# Patient Record
Sex: MALE | Race: BLACK OR AFRICAN AMERICAN | NOT HISPANIC OR LATINO | URBAN - NONMETROPOLITAN AREA
[De-identification: names, ages, dates, MRNs, and addresses within clinical notes are randomized per-mention and may not be internally consistent; named-entity substitution may affect disease eponyms.]

---

## 2017-06-26 LAB — CRC RECOMMENDATION EXT: NORMAL

## 2021-09-30 ENCOUNTER — OFFICE VISIT (OUTPATIENT)
Dept: FAMILY MEDICINE | Facility: CLINIC | Age: 66
End: 2021-09-30
Payer: COMMERCIAL

## 2021-09-30 VITALS
SYSTOLIC BLOOD PRESSURE: 106 MMHG | RESPIRATION RATE: 18 BRPM | BODY MASS INDEX: 29.4 KG/M2 | HEIGHT: 71 IN | DIASTOLIC BLOOD PRESSURE: 62 MMHG | TEMPERATURE: 99 F | HEART RATE: 86 BPM | OXYGEN SATURATION: 96 % | WEIGHT: 210 LBS

## 2021-09-30 DIAGNOSIS — I10 HYPERTENSION, UNSPECIFIED TYPE: Primary | ICD-10-CM

## 2021-09-30 DIAGNOSIS — Z12.5 SCREENING FOR PROSTATE CANCER: ICD-10-CM

## 2021-09-30 PROCEDURE — 99213 OFFICE O/P EST LOW 20 MIN: CPT | Mod: ,,, | Performed by: FAMILY MEDICINE

## 2021-09-30 PROCEDURE — 99213 PR OFFICE/OUTPT VISIT, EST, LEVL III, 20-29 MIN: ICD-10-PCS | Mod: ,,, | Performed by: FAMILY MEDICINE

## 2021-09-30 RX ORDER — FLUTICASONE PROPIONATE 50 MCG
2 SPRAY, SUSPENSION (ML) NASAL DAILY
COMMUNITY
End: 2021-11-16 | Stop reason: SDUPTHER

## 2021-09-30 RX ORDER — FLUTICASONE PROPIONATE AND SALMETEROL 250; 50 UG/1; UG/1
1 POWDER RESPIRATORY (INHALATION) DAILY
COMMUNITY
Start: 2021-04-04 | End: 2022-01-28

## 2021-09-30 RX ORDER — VALSARTAN AND HYDROCHLOROTHIAZIDE 160; 12.5 MG/1; MG/1
1 TABLET, FILM COATED ORAL DAILY
COMMUNITY
Start: 2021-06-29 | End: 2021-10-28 | Stop reason: SDUPTHER

## 2021-10-05 ENCOUNTER — TELEPHONE (OUTPATIENT)
Dept: FAMILY MEDICINE | Facility: CLINIC | Age: 66
End: 2021-10-05

## 2021-11-16 ENCOUNTER — OFFICE VISIT (OUTPATIENT)
Dept: FAMILY MEDICINE | Facility: CLINIC | Age: 66
End: 2021-11-16
Payer: COMMERCIAL

## 2021-11-16 VITALS
OXYGEN SATURATION: 98 % | HEART RATE: 79 BPM | SYSTOLIC BLOOD PRESSURE: 110 MMHG | TEMPERATURE: 98 F | HEIGHT: 71 IN | WEIGHT: 201 LBS | RESPIRATION RATE: 18 BRPM | DIASTOLIC BLOOD PRESSURE: 74 MMHG | BODY MASS INDEX: 28.14 KG/M2

## 2021-11-16 DIAGNOSIS — I10 HYPERTENSION, UNSPECIFIED TYPE: Primary | ICD-10-CM

## 2021-11-16 DIAGNOSIS — J30.9 ALLERGIC RHINITIS, UNSPECIFIED SEASONALITY, UNSPECIFIED TRIGGER: ICD-10-CM

## 2021-11-16 DIAGNOSIS — R06.02 SOB (SHORTNESS OF BREATH): ICD-10-CM

## 2021-11-16 PROCEDURE — 99213 PR OFFICE/OUTPT VISIT, EST, LEVL III, 20-29 MIN: ICD-10-PCS | Mod: ,,, | Performed by: FAMILY MEDICINE

## 2021-11-16 PROCEDURE — 99213 OFFICE O/P EST LOW 20 MIN: CPT | Mod: ,,, | Performed by: FAMILY MEDICINE

## 2021-11-16 RX ORDER — VALSARTAN 160 MG/1
160 TABLET ORAL DAILY
Qty: 90 TABLET | Refills: 0 | Status: SHIPPED | OUTPATIENT
Start: 2021-11-16 | End: 2021-11-24 | Stop reason: ALTCHOICE

## 2021-11-16 RX ORDER — FLUTICASONE PROPIONATE 50 MCG
2 SPRAY, SUSPENSION (ML) NASAL DAILY
Qty: 48 G | Refills: 1 | Status: SHIPPED | OUTPATIENT
Start: 2021-11-16 | End: 2023-01-30 | Stop reason: SDUPTHER

## 2021-11-23 ENCOUNTER — CLINICAL SUPPORT (OUTPATIENT)
Dept: PULMONOLOGY | Facility: HOSPITAL | Age: 66
End: 2021-11-23
Attending: FAMILY MEDICINE
Payer: COMMERCIAL

## 2021-11-23 DIAGNOSIS — R06.02 SOB (SHORTNESS OF BREATH): ICD-10-CM

## 2021-11-23 PROCEDURE — 94060 EVALUATION OF WHEEZING: CPT | Mod: 26,,, | Performed by: INTERNAL MEDICINE

## 2021-11-23 PROCEDURE — 94729 PR C02/MEMBANE DIFFUSE CAPACITY: ICD-10-PCS | Mod: 26,,, | Performed by: INTERNAL MEDICINE

## 2021-11-23 PROCEDURE — 94726 PLETHYSMOGRAPHY LUNG VOLUMES: CPT | Mod: 26,,, | Performed by: INTERNAL MEDICINE

## 2021-11-23 PROCEDURE — 94726 PULM FUNCT TST PLETHYSMOGRAP: ICD-10-PCS | Mod: 26,,, | Performed by: INTERNAL MEDICINE

## 2021-11-23 PROCEDURE — 94729 DIFFUSING CAPACITY: CPT | Mod: 26,,, | Performed by: INTERNAL MEDICINE

## 2021-11-23 PROCEDURE — 94729 DIFFUSING CAPACITY: CPT

## 2021-11-23 PROCEDURE — 94060 EVALUATION OF WHEEZING: CPT

## 2021-11-23 PROCEDURE — 94726 PLETHYSMOGRAPHY LUNG VOLUMES: CPT

## 2021-11-23 PROCEDURE — 94060 PR EVAL OF BRONCHOSPASM: ICD-10-PCS | Mod: 26,,, | Performed by: INTERNAL MEDICINE

## 2021-11-24 ENCOUNTER — TELEPHONE (OUTPATIENT)
Dept: FAMILY MEDICINE | Facility: CLINIC | Age: 66
End: 2021-11-24
Payer: COMMERCIAL

## 2021-11-24 ENCOUNTER — OFFICE VISIT (OUTPATIENT)
Dept: FAMILY MEDICINE | Facility: CLINIC | Age: 66
End: 2021-11-24
Payer: COMMERCIAL

## 2021-11-24 VITALS
HEART RATE: 91 BPM | WEIGHT: 201 LBS | OXYGEN SATURATION: 99 % | BODY MASS INDEX: 28.14 KG/M2 | RESPIRATION RATE: 18 BRPM | DIASTOLIC BLOOD PRESSURE: 98 MMHG | TEMPERATURE: 98 F | HEIGHT: 71 IN | SYSTOLIC BLOOD PRESSURE: 157 MMHG

## 2021-11-24 DIAGNOSIS — J98.4 RESTRICTIVE LUNG DISEASE: Primary | ICD-10-CM

## 2021-11-24 DIAGNOSIS — I10 HYPERTENSION, UNSPECIFIED TYPE: ICD-10-CM

## 2021-11-24 PROCEDURE — 99212 OFFICE O/P EST SF 10 MIN: CPT | Mod: ,,, | Performed by: FAMILY MEDICINE

## 2021-11-24 PROCEDURE — 99212 PR OFFICE/OUTPT VISIT, EST, LEVL II, 10-19 MIN: ICD-10-PCS | Mod: ,,, | Performed by: FAMILY MEDICINE

## 2021-11-24 RX ORDER — VALSARTAN AND HYDROCHLOROTHIAZIDE 160; 12.5 MG/1; MG/1
1 TABLET, FILM COATED ORAL DAILY
COMMUNITY
End: 2022-02-11 | Stop reason: SDUPTHER

## 2022-01-23 DIAGNOSIS — R06.02 SOB (SHORTNESS OF BREATH): Primary | ICD-10-CM

## 2022-01-27 ENCOUNTER — HOSPITAL ENCOUNTER (OUTPATIENT)
Dept: RADIOLOGY | Facility: HOSPITAL | Age: 67
Discharge: HOME | End: 2022-01-27
Attending: INTERNAL MEDICINE
Payer: COMMERCIAL

## 2022-01-27 ENCOUNTER — OFFICE VISIT (OUTPATIENT)
Dept: PULMONOLOGY | Facility: CLINIC | Age: 67
End: 2022-01-27
Payer: COMMERCIAL

## 2022-01-27 VITALS
DIASTOLIC BLOOD PRESSURE: 80 MMHG | SYSTOLIC BLOOD PRESSURE: 130 MMHG | OXYGEN SATURATION: 99 % | HEART RATE: 100 BPM | BODY MASS INDEX: 28.49 KG/M2 | HEIGHT: 73 IN | RESPIRATION RATE: 20 BRPM | WEIGHT: 215 LBS

## 2022-01-27 DIAGNOSIS — J98.4 RESTRICTIVE LUNG DISEASE: ICD-10-CM

## 2022-01-27 DIAGNOSIS — R06.02 SOB (SHORTNESS OF BREATH): ICD-10-CM

## 2022-01-27 PROCEDURE — 99203 OFFICE O/P NEW LOW 30 MIN: CPT | Mod: S$PBB,,, | Performed by: INTERNAL MEDICINE

## 2022-01-27 PROCEDURE — 71046 X-RAY EXAM CHEST 2 VIEWS: CPT | Mod: 26,,, | Performed by: RADIOLOGY

## 2022-01-27 PROCEDURE — 99203 PR OFFICE/OUTPT VISIT, NEW, LEVL III, 30-44 MIN: ICD-10-PCS | Mod: S$PBB,,, | Performed by: INTERNAL MEDICINE

## 2022-01-27 PROCEDURE — 71046 XR CHEST PA AND LATERAL: ICD-10-PCS | Mod: 26,,, | Performed by: RADIOLOGY

## 2022-01-27 PROCEDURE — 99215 OFFICE O/P EST HI 40 MIN: CPT | Mod: PBBFAC,25 | Performed by: INTERNAL MEDICINE

## 2022-01-27 PROCEDURE — 71046 X-RAY EXAM CHEST 2 VIEWS: CPT | Mod: TC

## 2022-01-27 RX ORDER — ALBUTEROL SULFATE 90 UG/1
2 AEROSOL, METERED RESPIRATORY (INHALATION) EVERY 6 HOURS PRN
Qty: 18 G | Refills: 5 | Status: SHIPPED | OUTPATIENT
Start: 2022-01-27 | End: 2022-06-13

## 2022-01-27 NOTE — ASSESSMENT & PLAN NOTE
Patient has been short of breath for approximately 2 years.  Currently without complaints other than that he denies any fever chills currently using anticholinergics inhaled steroids and Ventolin with not much improvement.  Chest x-ray looks unremarkable his restrictive disease in his poor functions without think a normal DLCO will proceed with high-resolution CT scan Cardiology evaluation and echocardiogram

## 2022-01-27 NOTE — PROGRESS NOTES
"Subjective:       Patient ID: Mikhail Stanford is a 66 y.o. male.    Chief Complaint: Referral    Shortness of Breath  This is a chronic problem. The current episode started more than 1 month ago. The problem occurs constantly. The problem has been unchanged. Pertinent negatives include no abdominal pain, chest pain, ear pain, headaches or rash. The symptoms are aggravated by exercise. The patient has no known risk factors for DVT/PE. He has tried steroid inhalers for the symptoms.     Past Medical History:   Diagnosis Date    COPD (chronic obstructive pulmonary disease)     Hypertension      No past surgical history on file.  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   HENT: Negative for congestion and ear pain.    Eyes: Negative for redness and itching.   Respiratory: Positive for shortness of breath.    Cardiovascular: Negative for chest pain and palpitations.   Musculoskeletal: Negative for arthralgias and back pain.   Skin: Negative for rash.   Gastrointestinal: Negative for abdominal pain and abdominal distention.   Neurological: Negative for dizziness and headaches.   Hematological: Negative for adenopathy. Does not bruise/bleed easily.   Psychiatric/Behavioral: Negative for confusion. The patient is not nervous/anxious.        Objective:      Physical Exam   Constitutional: He is " oriented to person, place, and time. He appears well-developed and well-nourished.   HENT:   Head: Normocephalic.   Nose: Nose normal.   Mouth/Throat: Oropharynx is clear and moist.   Neck: No JVD present. No thyromegaly present.   Cardiovascular: Normal rate, regular rhythm, normal heart sounds and intact distal pulses.   Pulmonary/Chest: Normal expansion, hyperinflation, symmetric chest wall expansion, effort normal and breath sounds normal.   Abdominal: Soft. Bowel sounds are normal.   Musculoskeletal:         General: Normal range of motion.      Cervical back: Normal range of motion and neck supple.   Lymphadenopathy: No supraclavicular adenopathy is present.     He has no cervical adenopathy.     He has no axillary adenopathy.   Neurological: He is alert and oriented to person, place, and time. He has normal reflexes.   Skin: Skin is warm and dry.   Psychiatric: He has a normal mood and affect. His behavior is normal.     Personal Diagnostic Review  none pertinent    No flowsheet data found.      Assessment:       1. Restrictive lung disease    2. SOB (shortness of breath)        Outpatient Encounter Medications as of 1/27/2022   Medication Sig Dispense Refill    atorvastatin (LIPITOR) 40 MG tablet TAKE 1 TABLET AT BEDTIME 90 tablet 1    esomeprazole (NEXIUM) 40 MG capsule TAKE 1 CAPSULE TWICE DAILY 180 capsule 1    ezetimibe (ZETIA) 10 mg tablet TAKE 1 TABLET DAILY 90 tablet 1    fluticasone propionate (FLONASE) 50 mcg/actuation nasal spray 2 sprays (100 mcg total) by Each Nostril route once daily. 48 g 1    SPIRIVA WITH HANDIHALER 18 mcg inhalation capsule INHALE THE CONTENTS OF 1   CAPSULE (VIA 2 INHALATIONS)DAILY 90 capsule 1    tadalafiL (CIALIS) 20 MG Tab TAKE 1 TABLET DAILY AS     NEEDED 24 tablet 3    valsartan-hydrochlorothiazide (DIOVAN-HCT) 160-12.5 mg per tablet Take 1 tablet by mouth once daily.      WIXELA INHUB 250-50 mcg/dose diskus inhaler Inhale 1 puff into the lungs once daily.       albuterol (VENTOLIN HFA) 90 mcg/actuation inhaler Inhale 2 puffs into the lungs every 6 (six) hours as needed for Wheezing. Rescue 18 g 5     No facility-administered encounter medications on file as of 1/27/2022.     Orders Placed This Encounter   Procedures    CT Chest High Resolution Without Contrast     Standing Status:   Future     Standing Expiration Date:   1/27/2023     Order Specific Question:   May the Radiologist modify the order per protocol to meet the clinical needs of the patient?     Answer:   Yes    Ambulatory referral/consult to Cardiology     Standing Status:   Future     Standing Expiration Date:   2/27/2023     Referral Priority:   Routine     Referral Type:   Consultation     Referral Reason:   Specialty Services Required     Requested Specialty:   Cardiology     Number of Visits Requested:   1    Echo     Standing Status:   Future     Standing Expiration Date:   1/27/2023     Order Specific Question:   Color Doppler?     Answer:   Yes     Order Specific Question:   Release to patient     Answer:   Immediate       Plan:       Problem List Items Addressed This Visit        Other    SOB (shortness of breath)     Patient has been short of breath for approximately 2 years.  Currently without complaints other than that he denies any fever chills currently using anticholinergics inhaled steroids and Ventolin with not much improvement.  Chest x-ray looks unremarkable his restrictive disease in his poor functions without think a normal DLCO will proceed with high-resolution CT scan Cardiology evaluation and echocardiogram         Relevant Medications    albuterol (VENTOLIN HFA) 90 mcg/actuation inhaler    Other Relevant Orders    Ambulatory referral/consult to Cardiology    CT Chest High Resolution Without Contrast    Echo      Other Visit Diagnoses     Restrictive lung disease

## 2022-02-04 ENCOUNTER — HOSPITAL ENCOUNTER (OUTPATIENT)
Dept: CARDIOLOGY | Facility: HOSPITAL | Age: 67
Discharge: HOME | End: 2022-02-04
Attending: INTERNAL MEDICINE
Payer: COMMERCIAL

## 2022-02-04 DIAGNOSIS — R06.02 SOB (SHORTNESS OF BREATH): ICD-10-CM

## 2022-02-04 LAB
AORTIC VALVE CUSP SEPERATION: 19.4 CM
AV INDEX (PROSTH): 0.96
AV VALVE AREA: 3.33 CM2
CV ECHO LV RWT: 0.84 CM
DOP CALC AO VTI: 21.38 CM
DOP CALC LVOT AREA: 3.5 CM2
DOP CALC LVOT DIAMETER: 2.1 CM
DOP CALC LVOT STROKE VOLUME: 71.21 CM3
DOP CALC MV VTI: 31.1 CM
DOP CALCLVOT PEAK VEL VTI: 20.57 CM
E WAVE DECELERATION TIME: 246 MSEC
ECHO LV POSTERIOR WALL: 1.73 CM (ref 0.6–1.1)
EJECTION FRACTION: 60 %
FRACTIONAL SHORTENING: 34 % (ref 28–44)
INTERVENTRICULAR SEPTUM: 1.2 CM (ref 0.6–1.1)
IVC DIAMETER: 2.1 CM
LEFT ATRIUM SIZE: 4.6 CM
LEFT INTERNAL DIMENSION IN SYSTOLE: 2.7 CM (ref 2.1–4)
LEFT VENTRICULAR INTERNAL DIMENSION IN DIASTOLE: 4.1 CM (ref 3.5–6)
LEFT VENTRICULAR MASS: 232.32 G
MV MEAN GRADIENT: 5 MMHG
MV PEAK E VEL: 1.28 M/S
MV STENOSIS PRESSURE HALF TIME: 63 MS
MV VALVE AREA BY CONTINUITY EQUATION: 2.29 CM2
MV VALVE AREA P 1/2 METHOD: 3.49 CM2

## 2022-02-04 PROCEDURE — 93306 TTE W/DOPPLER COMPLETE: CPT | Mod: 26,,, | Performed by: INTERNAL MEDICINE

## 2022-02-04 PROCEDURE — 93306 TTE W/DOPPLER COMPLETE: CPT

## 2022-02-04 PROCEDURE — 93306 ECHO (CUPID ONLY): ICD-10-PCS | Mod: 26,,, | Performed by: INTERNAL MEDICINE

## 2022-02-11 RX ORDER — VALSARTAN AND HYDROCHLOROTHIAZIDE 160; 12.5 MG/1; MG/1
1 TABLET, FILM COATED ORAL DAILY
Qty: 90 TABLET | Refills: 1 | Status: SHIPPED | OUTPATIENT
Start: 2022-02-11 | End: 2022-07-03 | Stop reason: SDUPTHER

## 2022-02-14 ENCOUNTER — OFFICE VISIT (OUTPATIENT)
Dept: CARDIOLOGY | Facility: CLINIC | Age: 67
End: 2022-02-14
Payer: COMMERCIAL

## 2022-02-14 VITALS
HEIGHT: 71 IN | SYSTOLIC BLOOD PRESSURE: 140 MMHG | HEART RATE: 95 BPM | RESPIRATION RATE: 16 BRPM | DIASTOLIC BLOOD PRESSURE: 80 MMHG | BODY MASS INDEX: 31.36 KG/M2 | WEIGHT: 224 LBS

## 2022-02-14 DIAGNOSIS — R06.02 SOB (SHORTNESS OF BREATH): Primary | ICD-10-CM

## 2022-02-14 DIAGNOSIS — I10 HYPERTENSION, UNSPECIFIED TYPE: ICD-10-CM

## 2022-02-14 DIAGNOSIS — R06.02 SOB (SHORTNESS OF BREATH): ICD-10-CM

## 2022-02-14 PROCEDURE — 93010 ELECTROCARDIOGRAM REPORT: CPT | Mod: S$PBB,,, | Performed by: STUDENT IN AN ORGANIZED HEALTH CARE EDUCATION/TRAINING PROGRAM

## 2022-02-14 PROCEDURE — 99204 PR OFFICE/OUTPT VISIT, NEW, LEVL IV, 45-59 MIN: ICD-10-PCS | Mod: S$PBB,,, | Performed by: STUDENT IN AN ORGANIZED HEALTH CARE EDUCATION/TRAINING PROGRAM

## 2022-02-14 PROCEDURE — 93010 EKG 12-LEAD: ICD-10-PCS | Mod: S$PBB,,, | Performed by: STUDENT IN AN ORGANIZED HEALTH CARE EDUCATION/TRAINING PROGRAM

## 2022-02-14 PROCEDURE — 93005 ELECTROCARDIOGRAM TRACING: CPT | Mod: PBBFAC | Performed by: STUDENT IN AN ORGANIZED HEALTH CARE EDUCATION/TRAINING PROGRAM

## 2022-02-14 PROCEDURE — 99204 OFFICE O/P NEW MOD 45 MIN: CPT | Mod: S$PBB,,, | Performed by: STUDENT IN AN ORGANIZED HEALTH CARE EDUCATION/TRAINING PROGRAM

## 2022-02-14 PROCEDURE — 99214 OFFICE O/P EST MOD 30 MIN: CPT | Mod: PBBFAC | Performed by: STUDENT IN AN ORGANIZED HEALTH CARE EDUCATION/TRAINING PROGRAM

## 2022-02-14 NOTE — ASSESSMENT & PLAN NOTE
Chronic  Follows Dr. Castellano; pulmonary workup bland. CT chest pending  ECHO with normal LVEF; indeterminate diastolic dysfunction  Will get EKG stress test.

## 2022-02-14 NOTE — PROGRESS NOTES
"PCP: Vishnu Ballesteros MD    Referring Provider:     Subjective:   Mikhail Stanford is a 67 y.o. male with hx of HTN, HLD who presents for evaluation of SOB.     Patient of Dr. Castellano.   Patient reports chronic issues with shortness of breath at rest and with exertion. Associated with runny nose. Episode occur daily. For last 2 years.     Fhx: non-contributary  Shx: Denies smoking, Hx of remote drug user (>30 years). No etoh    EKG 2/14/22 - NSR - normal  ECHO 2/2022 - normal LV and size fxn. Mild LAE, mitral valve thickening without stenosis. Indeterminate diastolic dysfunction        Lab Results   Component Value Date     10/04/2021    K 4.4 10/04/2021     10/04/2021    CO2 31 10/04/2021    BUN 28 (H) 10/04/2021    CREATININE 1.53 (H) 10/04/2021    CALCIUM 9.0 10/04/2021    ANIONGAP 9 10/04/2021    ESTGFRAFRICA 59 (L) 10/04/2021       Lab Results   Component Value Date    CHOL 126 10/04/2021     Lab Results   Component Value Date    HDL 67 (H) 10/04/2021     Lab Results   Component Value Date    LDLCALC 49 10/04/2021     Lab Results   Component Value Date    TRIG 49 10/04/2021     Lab Results   Component Value Date    CHOLHDL 1.9 10/04/2021       Lab Results   Component Value Date    WBC 5.15 10/04/2021    HGB 13.7 10/04/2021    HCT 41.8 10/04/2021    MCV 91.1 10/04/2021     10/04/2021           Review of Systems   Respiratory: Positive for shortness of breath. Negative for cough.    Cardiovascular: Negative for chest pain, palpitations, orthopnea, claudication, leg swelling and PND.         Objective:   BP (!) 140/80   Pulse 95   Resp 16   Ht 5' 11" (1.803 m)   Wt 101.6 kg (224 lb)   BMI 31.24 kg/m²     Physical Exam  Vitals and nursing note reviewed.   Cardiovascular:      Rate and Rhythm: Normal rate and regular rhythm.      Pulses: Normal pulses.      Heart sounds: Normal heart sounds.   Pulmonary:      Breath sounds: Normal breath sounds.   Neurological:      Mental Status: He is " oriented to person, place, and time.           Assessment:     1. SOB (shortness of breath)  Ambulatory referral/consult to Cardiology    EKG 12-lead    Exercise Stress - EKG   2. Hypertension, unspecified type           Plan:   SOB (shortness of breath)  Chronic  Follows Dr. Castellano; pulmonary workup bland. CT chest pending  ECHO with normal LVEF; indeterminate diastolic dysfunction  Will get EKG stress test.     Hypertension  140/80  Doing well  Advised caffeine reduction

## 2022-02-22 ENCOUNTER — HOSPITAL ENCOUNTER (OUTPATIENT)
Dept: CARDIOLOGY | Facility: HOSPITAL | Age: 67
Discharge: HOME | End: 2022-02-22
Attending: STUDENT IN AN ORGANIZED HEALTH CARE EDUCATION/TRAINING PROGRAM
Payer: COMMERCIAL

## 2022-02-22 VITALS
DIASTOLIC BLOOD PRESSURE: 74 MMHG | HEIGHT: 71 IN | HEART RATE: 89 BPM | BODY MASS INDEX: 30.1 KG/M2 | WEIGHT: 215 LBS | SYSTOLIC BLOOD PRESSURE: 123 MMHG

## 2022-02-22 DIAGNOSIS — R06.02 SOB (SHORTNESS OF BREATH): ICD-10-CM

## 2022-02-22 LAB
CV STRESS BASE HR: 89 BPM
DIASTOLIC BLOOD PRESSURE: 74 MMHG
OHS CV CPX 1 MINUTE RECOVERY HEART RATE: 21 BPM
OHS CV CPX 85 PERCENT MAX PREDICTED HEART RATE MALE: 132
OHS CV CPX ESTIMATED METS: 8
OHS CV CPX MAX PREDICTED HEART RATE: 155
OHS CV CPX PATIENT IS FEMALE: 0
OHS CV CPX PATIENT IS MALE: 1
OHS CV CPX PEAK DIASTOLIC BLOOD PRESSURE: 71 MMHG
OHS CV CPX PEAK HEAR RATE: 155 BPM
OHS CV CPX PEAK RATE PRESSURE PRODUCT: NORMAL
OHS CV CPX PEAK SYSTOLIC BLOOD PRESSURE: 160 MMHG
OHS CV CPX PERCENT MAX PREDICTED HEART RATE ACHIEVED: 100
OHS CV CPX RATE PRESSURE PRODUCT PRESENTING: NORMAL
STRESS ECHO POST EXERCISE DUR MIN: 6 MINUTES
STRESS ECHO POST EXERCISE DUR SEC: 43 SECONDS
SYSTOLIC BLOOD PRESSURE: 123 MMHG

## 2022-02-22 PROCEDURE — 93017 CV STRESS TEST TRACING ONLY: CPT

## 2022-02-22 PROCEDURE — 93018 CV STRESS TEST I&R ONLY: CPT | Mod: ,,, | Performed by: STUDENT IN AN ORGANIZED HEALTH CARE EDUCATION/TRAINING PROGRAM

## 2022-02-22 PROCEDURE — 93016 EXERCISE STRESS - EKG (CUPID ONLY): ICD-10-PCS | Mod: ,,, | Performed by: NURSE PRACTITIONER

## 2022-02-22 PROCEDURE — 93016 CV STRESS TEST SUPVJ ONLY: CPT | Mod: ,,, | Performed by: NURSE PRACTITIONER

## 2022-02-22 PROCEDURE — 93018 EXERCISE STRESS - EKG (CUPID ONLY): ICD-10-PCS | Mod: ,,, | Performed by: STUDENT IN AN ORGANIZED HEALTH CARE EDUCATION/TRAINING PROGRAM

## 2022-02-28 RX ORDER — TADALAFIL 20 MG/1
20 TABLET ORAL DAILY PRN
Qty: 24 TABLET | Refills: 3 | Status: SHIPPED | OUTPATIENT
Start: 2022-02-28 | End: 2022-06-13 | Stop reason: SDUPTHER

## 2022-02-28 RX ORDER — ATORVASTATIN CALCIUM 40 MG/1
40 TABLET, FILM COATED ORAL NIGHTLY
Qty: 90 TABLET | Refills: 1 | Status: SHIPPED | OUTPATIENT
Start: 2022-02-28 | End: 2022-07-25 | Stop reason: SDUPTHER

## 2022-03-08 ENCOUNTER — TELEPHONE (OUTPATIENT)
Dept: PULMONOLOGY | Facility: CLINIC | Age: 67
End: 2022-03-08
Payer: COMMERCIAL

## 2022-03-08 NOTE — TELEPHONE ENCOUNTER
Call was received earlier today from [facility]    who had been working on obtaining approval for   High Resolution CT Scan  that was scheduled for tomorrow as ordered by  at 's l;ast visit.    CT coverage was denied by pt's insurance.     was phoned and given this information.    He was encouraged to keep tomorrow's appt so he and    can review current status and future plans.  Pt voiced understanding and reported he plans to keep   appt as scheduled for 3/9/22.

## 2022-03-09 ENCOUNTER — OFFICE VISIT (OUTPATIENT)
Dept: PULMONOLOGY | Facility: CLINIC | Age: 67
End: 2022-03-09
Payer: COMMERCIAL

## 2022-03-09 VITALS
DIASTOLIC BLOOD PRESSURE: 90 MMHG | BODY MASS INDEX: 30.1 KG/M2 | HEART RATE: 93 BPM | WEIGHT: 215 LBS | OXYGEN SATURATION: 96 % | HEIGHT: 71 IN | SYSTOLIC BLOOD PRESSURE: 136 MMHG | RESPIRATION RATE: 16 BRPM

## 2022-03-09 DIAGNOSIS — J84.9 INTERSTITIAL LUNG DISEASE: ICD-10-CM

## 2022-03-09 PROCEDURE — 99212 PR OFFICE/OUTPT VISIT, EST, LEVL II, 10-19 MIN: ICD-10-PCS | Mod: S$PBB,,, | Performed by: INTERNAL MEDICINE

## 2022-03-09 PROCEDURE — 99212 OFFICE O/P EST SF 10 MIN: CPT | Mod: S$PBB,,, | Performed by: INTERNAL MEDICINE

## 2022-03-09 PROCEDURE — 99214 OFFICE O/P EST MOD 30 MIN: CPT | Mod: PBBFAC | Performed by: INTERNAL MEDICINE

## 2022-03-09 NOTE — PROGRESS NOTES
"Subjective:       Patient ID: Mikhail Stanford is a 67 y.o. male.    Chief Complaint: Interstitial Lung Disease (6 week follow up)    Shortness of Breath  This is a chronic problem. The current episode started more than 1 year ago. The problem occurs daily. The problem has been unchanged. Pertinent negatives include no abdominal pain, chest pain, ear pain, headaches or rash. Nothing aggravates the symptoms. The patient has no known risk factors for DVT/PE. He has tried nothing for the symptoms.     Past Medical History:   Diagnosis Date    COPD (chronic obstructive pulmonary disease)     Hypertension      History reviewed. No pertinent surgical history.  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   HENT: Negative for congestion and ear pain.    Eyes: Negative for redness and itching.   Respiratory: Positive for cough and shortness of breath.    Cardiovascular: Negative for chest pain and palpitations.   Musculoskeletal: Negative for arthralgias and back pain.   Skin: Negative for rash.   Gastrointestinal: Negative for abdominal pain and abdominal distention.   Neurological: Negative for dizziness and headaches.   Hematological: Negative for adenopathy. Does not bruise/bleed easily.   Psychiatric/Behavioral: Negative for confusion. The patient is not nervous/anxious.        Objective:    "   Physical Exam   Constitutional: He is oriented to person, place, and time. He appears well-developed and well-nourished.   HENT:   Head: Normocephalic.   Nose: Nose normal.   Mouth/Throat: Oropharynx is clear and moist.   Neck: No JVD present. No thyromegaly present.   Cardiovascular: Normal rate, regular rhythm, normal heart sounds and intact distal pulses.   Pulmonary/Chest: Normal expansion, hyperinflation, symmetric chest wall expansion and effort normal. He has rales.   Abdominal: Soft. Bowel sounds are normal.   Musculoskeletal:         General: Normal range of motion.      Cervical back: Normal range of motion and neck supple.   Lymphadenopathy: No supraclavicular adenopathy is present.     He has no cervical adenopathy.     He has no axillary adenopathy.   Neurological: He is alert and oriented to person, place, and time. He has normal reflexes.   Skin: Skin is warm and dry.   Psychiatric: He has a normal mood and affect. His behavior is normal.     Personal Diagnostic Review  none pertinent    No flowsheet data found.      Assessment:       1. Interstitial lung disease        Outpatient Encounter Medications as of 3/9/2022   Medication Sig Dispense Refill    albuterol (VENTOLIN HFA) 90 mcg/actuation inhaler Inhale 2 puffs into the lungs every 6 (six) hours as needed for Wheezing. Rescue 18 g 5    atorvastatin (LIPITOR) 40 MG tablet Take 1 tablet (40 mg total) by mouth nightly. 90 tablet 1    esomeprazole (NEXIUM) 40 MG capsule TAKE 1 CAPSULE TWICE DAILY 180 capsule 1    ezetimibe (ZETIA) 10 mg tablet TAKE 1 TABLET DAILY 90 tablet 1    fluticasone propionate (FLONASE) 50 mcg/actuation nasal spray 2 sprays (100 mcg total) by Each Nostril route once daily. 48 g 1    SPIRIVA WITH HANDIHALER 18 mcg inhalation capsule INHALE THE CONTENTS OF 1   CAPSULE (VIA 2 INHALATIONS)DAILY 90 capsule 1    tadalafiL (CIALIS) 20 MG Tab Take 1 tablet (20 mg total) by mouth daily as needed. 24 tablet 3     valsartan-hydrochlorothiazide (DIOVAN-HCT) 160-12.5 mg per tablet Take 1 tablet by mouth once daily. 90 tablet 1    WIXELA INHUB 250-50 mcg/dose diskus inhaler USE 1 INHALATION ORALLY    TWICE DAILY 180 each 1     No facility-administered encounter medications on file as of 3/9/2022.     Orders Placed This Encounter   Procedures    CT Chest High Resolution Without Contrast     Standing Status:   Future     Standing Expiration Date:   3/9/2023     Order Specific Question:   May the Radiologist modify the order per protocol to meet the clinical needs of the patient?     Answer:   Yes       Plan:       Problem List Items Addressed This Visit        Pulmonary    Interstitial lung disease     Patient has shortness of breath been going on for 2 years with pulmonary functions showed restrictive ventilatory impairment.  He is on multiple inhalers and doing well he does not smoke.  My differential includes interstitial lung disease including idiopathic pulmonary fibrosis.  The diagnostic test of choice is high-resolution CT scan of the chest he can sometimes have abnormal x-ray and it is the diagnostic standard.  Previously insurance refused to let him have a high-resolution CT scan his it was medically necessary my opinion is medically necessary were going to reschedule           Relevant Orders    CT Chest High Resolution Without Contrast

## 2022-03-09 NOTE — ASSESSMENT & PLAN NOTE
Patient has shortness of breath been going on for 2 years with pulmonary functions showed restrictive ventilatory impairment.  He is on multiple inhalers and doing well he does not smoke.  My differential includes interstitial lung disease including idiopathic pulmonary fibrosis.  The diagnostic test of choice is high-resolution CT scan of the chest he can sometimes have abnormal x-ray and it is the diagnostic standard.  Previously insurance refused to let him have a high-resolution CT scan his it was medically necessary my opinion is medically necessary were going to reschedule

## 2022-03-13 ENCOUNTER — OFFICE VISIT (OUTPATIENT)
Dept: FAMILY MEDICINE | Facility: CLINIC | Age: 67
End: 2022-03-13
Payer: COMMERCIAL

## 2022-03-13 VITALS
HEIGHT: 71 IN | HEART RATE: 94 BPM | DIASTOLIC BLOOD PRESSURE: 84 MMHG | WEIGHT: 219.19 LBS | TEMPERATURE: 98 F | BODY MASS INDEX: 30.69 KG/M2 | OXYGEN SATURATION: 98 % | SYSTOLIC BLOOD PRESSURE: 149 MMHG

## 2022-03-13 DIAGNOSIS — M26.622 ARTHRALGIA OF LEFT TEMPOROMANDIBULAR JOINT: Primary | ICD-10-CM

## 2022-03-13 PROCEDURE — 99213 OFFICE O/P EST LOW 20 MIN: CPT | Mod: 25,,, | Performed by: NURSE PRACTITIONER

## 2022-03-13 PROCEDURE — 96372 PR INJECTION,THERAP/PROPH/DIAG2ST, IM OR SUBCUT: ICD-10-PCS | Mod: ,,, | Performed by: NURSE PRACTITIONER

## 2022-03-13 PROCEDURE — 99051 PR MEDICAL SERVICES, EVE/WKEND/HOLIDAY: ICD-10-PCS | Mod: ,,, | Performed by: NURSE PRACTITIONER

## 2022-03-13 PROCEDURE — 99051 MED SERV EVE/WKEND/HOLIDAY: CPT | Mod: ,,, | Performed by: NURSE PRACTITIONER

## 2022-03-13 PROCEDURE — 96372 THER/PROPH/DIAG INJ SC/IM: CPT | Mod: ,,, | Performed by: NURSE PRACTITIONER

## 2022-03-13 PROCEDURE — 99213 PR OFFICE/OUTPT VISIT, EST, LEVL III, 20-29 MIN: ICD-10-PCS | Mod: 25,,, | Performed by: NURSE PRACTITIONER

## 2022-03-13 RX ORDER — TIOTROPIUM BROMIDE INHALATION SPRAY 3.12 UG/1
SPRAY, METERED RESPIRATORY (INHALATION)
COMMUNITY
End: 2022-06-13 | Stop reason: SDUPTHER

## 2022-03-13 RX ORDER — NAPROXEN 500 MG/1
500 TABLET ORAL 2 TIMES DAILY WITH MEALS
Qty: 20 TABLET | Refills: 0 | Status: SHIPPED | OUTPATIENT
Start: 2022-03-13 | End: 2022-06-13

## 2022-03-13 RX ORDER — KETOROLAC TROMETHAMINE 30 MG/ML
60 INJECTION, SOLUTION INTRAMUSCULAR; INTRAVENOUS
Status: COMPLETED | OUTPATIENT
Start: 2022-03-13 | End: 2022-03-13

## 2022-03-13 RX ORDER — CYCLOBENZAPRINE HCL 10 MG
5 TABLET ORAL 3 TIMES DAILY PRN
Qty: 15 TABLET | Refills: 0 | Status: SHIPPED | OUTPATIENT
Start: 2022-03-13 | End: 2022-03-23

## 2022-03-13 RX ORDER — TADALAFIL 10 MG/1
TABLET ORAL
COMMUNITY
End: 2022-06-13 | Stop reason: SDUPTHER

## 2022-03-13 RX ORDER — DEXAMETHASONE SODIUM PHOSPHATE 4 MG/ML
6 INJECTION, SOLUTION INTRA-ARTICULAR; INTRALESIONAL; INTRAMUSCULAR; INTRAVENOUS; SOFT TISSUE
Status: COMPLETED | OUTPATIENT
Start: 2022-03-13 | End: 2022-03-13

## 2022-03-13 RX ADMIN — KETOROLAC TROMETHAMINE 60 MG: 30 INJECTION, SOLUTION INTRAMUSCULAR; INTRAVENOUS at 09:03

## 2022-03-13 RX ADMIN — DEXAMETHASONE SODIUM PHOSPHATE 6 MG: 4 INJECTION, SOLUTION INTRA-ARTICULAR; INTRALESIONAL; INTRAMUSCULAR; INTRAVENOUS; SOFT TISSUE at 09:03

## 2022-03-13 NOTE — PROGRESS NOTES
""Nodule" palpated to right hip area prior to IM injection. Site avoided upon administering IM injection.    "

## 2022-03-13 NOTE — PROGRESS NOTES
Subjective:       Patient ID: Mikhail Stanford is a 67 y.o. male.    Chief Complaint: Otalgia (Left ear only when he chews or yawns)    66 y/o male presents today with c/o left ear pain x 1 week that only hurts when he chews and while closing his mouth after opening it wide. Pain was sudden onset, and 0/10 when not opening mouth or chewing but 10/10 if yawns or while chewing. Denies any previous jaw issues. No fever or chills. No difficulty swallowing. Of note patient also had dental surgery performed by Dr. Costello approximately 2 weeks ago and reports his mouth was open for hours. Surgery site is minimally tender and has significantly improved over the past two weeks. No signs of infection.    Review of Systems   Constitutional: Negative for chills and fever.   HENT: Positive for nasal congestion and ear pain. Negative for facial swelling, mouth sores, sinus pressure/congestion, sore throat and trouble swallowing.    Eyes: Negative for discharge and redness.   Respiratory: Negative for cough.    Gastrointestinal: Negative for abdominal pain, nausea and vomiting.   Musculoskeletal: Negative for neck pain and neck stiffness.   Hematological: Negative for adenopathy.         Objective:      Physical Exam  Vitals reviewed.   Constitutional:       General: He is not in acute distress.  HENT:      Head:      Jaw: Pain on movement present. No tenderness or swelling.      Salivary Glands: Right salivary gland is not diffusely enlarged or tender. Left salivary gland is not diffusely enlarged or tender.      Right Ear: Tympanic membrane normal. No tenderness. No middle ear effusion. No mastoid tenderness. Tympanic membrane is not perforated or erythematous.      Left Ear: Tympanic membrane normal. No tenderness.  No middle ear effusion. No mastoid tenderness. Tympanic membrane is not perforated or erythematous.      Nose: Nose normal.      Right Sinus: No maxillary sinus tenderness or frontal sinus tenderness.      Left Sinus:  No maxillary sinus tenderness or frontal sinus tenderness.      Mouth/Throat:      Lips: Pink.      Mouth: Mucous membranes are moist.      Dentition: No dental tenderness, gingival swelling or dental abscesses.      Tongue: No lesions.      Palate: No mass and lesions.      Pharynx: Oropharynx is clear. No pharyngeal swelling or posterior oropharyngeal erythema.   Cardiovascular:      Rate and Rhythm: Normal rate and regular rhythm.      Heart sounds: Normal heart sounds.   Pulmonary:      Effort: Pulmonary effort is normal.      Breath sounds: Normal breath sounds. No wheezing, rhonchi or rales.   Musculoskeletal:      Cervical back: Neck supple. No edema or rigidity.   Lymphadenopathy:      Cervical: No cervical adenopathy.   Skin:     General: Skin is warm and dry.   Neurological:      Mental Status: He is alert and oriented to person, place, and time.         Assessment:       Problem List Items Addressed This Visit    None     Visit Diagnoses     Arthralgia of left temporomandibular joint    -  Primary          Plan:       Toradol 60mg IM  Decadron 6mg IM     No signs of infection, instructed to follow up with Dr. Costello this week. Signs and symptoms that would warrant further medical evaluation were discussed in detail with patient, he verbalized understanding.  Flexeril and naproxen (do not take until 24 hours after toradol injection) as needed  Jaw rest, limit chewing, soft diet

## 2022-03-31 ENCOUNTER — HOSPITAL ENCOUNTER (OUTPATIENT)
Dept: RADIOLOGY | Facility: HOSPITAL | Age: 67
Discharge: HOME | End: 2022-03-31
Attending: INTERNAL MEDICINE
Payer: COMMERCIAL

## 2022-03-31 DIAGNOSIS — J84.9 INTERSTITIAL LUNG DISEASE: ICD-10-CM

## 2022-03-31 PROCEDURE — 71250 CT THORAX DX C-: CPT | Mod: TC

## 2022-03-31 PROCEDURE — 71250 CT THORAX DX C-: CPT | Mod: 26,,, | Performed by: RADIOLOGY

## 2022-03-31 PROCEDURE — 71250 CT CHEST HIGH RESOLUTION WITHOUT CONTRAST: ICD-10-PCS | Mod: 26,,, | Performed by: RADIOLOGY

## 2022-05-12 ENCOUNTER — HOSPITAL ENCOUNTER (OUTPATIENT)
Dept: RADIOLOGY | Facility: HOSPITAL | Age: 67
Discharge: HOME | End: 2022-05-12
Attending: INTERNAL MEDICINE
Payer: COMMERCIAL

## 2022-05-12 ENCOUNTER — OFFICE VISIT (OUTPATIENT)
Dept: PULMONOLOGY | Facility: CLINIC | Age: 67
End: 2022-05-12
Payer: COMMERCIAL

## 2022-05-12 VITALS
WEIGHT: 219 LBS | HEART RATE: 88 BPM | DIASTOLIC BLOOD PRESSURE: 76 MMHG | RESPIRATION RATE: 16 BRPM | SYSTOLIC BLOOD PRESSURE: 118 MMHG | HEIGHT: 71 IN | OXYGEN SATURATION: 95 % | BODY MASS INDEX: 30.66 KG/M2

## 2022-05-12 DIAGNOSIS — R22.2 LOCALIZED SWELLING, MASS AND LUMP, TRUNK: ICD-10-CM

## 2022-05-12 DIAGNOSIS — J84.9 INTERSTITIAL LUNG DISEASE: ICD-10-CM

## 2022-05-12 PROCEDURE — 99214 PR OFFICE/OUTPT VISIT, EST, LEVL IV, 30-39 MIN: ICD-10-PCS | Mod: S$PBB,,, | Performed by: INTERNAL MEDICINE

## 2022-05-12 PROCEDURE — 71046 X-RAY EXAM CHEST 2 VIEWS: CPT | Mod: TC

## 2022-05-12 PROCEDURE — 71046 X-RAY EXAM CHEST 2 VIEWS: CPT | Mod: 26,,, | Performed by: STUDENT IN AN ORGANIZED HEALTH CARE EDUCATION/TRAINING PROGRAM

## 2022-05-12 PROCEDURE — 99215 OFFICE O/P EST HI 40 MIN: CPT | Mod: PBBFAC,25 | Performed by: INTERNAL MEDICINE

## 2022-05-12 PROCEDURE — 99214 OFFICE O/P EST MOD 30 MIN: CPT | Mod: S$PBB,,, | Performed by: INTERNAL MEDICINE

## 2022-05-12 PROCEDURE — 71046 XR CHEST PA AND LATERAL: ICD-10-PCS | Mod: 26,,, | Performed by: STUDENT IN AN ORGANIZED HEALTH CARE EDUCATION/TRAINING PROGRAM

## 2022-05-12 NOTE — ASSESSMENT & PLAN NOTE
Patient has some mild restrictive changes on his pulmonary function test but no evidence of of decreased DLCO on his CT scan there is no evidence of any abnormality except there is a 1.3 cm lesion that looks infiltrative you were going to get x-ray today CBC it if not will repeat CT in 3 months to make sure there is no evidence of this mass if it is still there may have to consider biopsy

## 2022-05-12 NOTE — PROGRESS NOTES
"Subjective:       Patient ID: Mikhail Stanford is a 67 y.o. male.    Chief Complaint: Interstitial Lung Disease (2 month follow up)    Shortness of Breath  This is a chronic problem. The current episode started more than 1 year ago. The problem occurs intermittently. The problem has been unchanged. Pertinent negatives include no abdominal pain, chest pain, ear pain, headaches or rash. The symptoms are aggravated by exercise. The patient has no known risk factors for DVT/PE. His past medical history is significant for chronic lung disease.     Past Medical History:   Diagnosis Date    COPD (chronic obstructive pulmonary disease)     Hypertension      Past Surgical History:   Procedure Laterality Date    DENTAL SURGERY       Family History   Problem Relation Age of Onset    Cancer Mother     Diabetes Sister     Hyperlipidemia Sister      Review of patient's allergies indicates:  No Known Allergies   Social History     Tobacco Use    Smoking status: Former Smoker     Packs/day: 2.00     Years: 25.00     Pack years: 50.00     Types: Cigarettes    Smokeless tobacco: Former User     Types: Chew     Quit date: 1/27/2006   Substance Use Topics    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   HENT: Negative for congestion and ear pain.    Eyes: Negative for redness and itching.   Respiratory: Positive for shortness of breath.    Cardiovascular: Negative for chest pain and palpitations.   Musculoskeletal: Negative for arthralgias and back pain.   Skin: Negative for rash.   Gastrointestinal: Negative for abdominal pain and abdominal distention.   Neurological: Negative for dizziness and headaches.   Hematological: Negative for adenopathy. Does not bruise/bleed easily.   Psychiatric/Behavioral: Negative for " confusion. The patient is not nervous/anxious.        Objective:      Physical Exam   Constitutional: He is oriented to person, place, and time. He appears well-developed and well-nourished.   HENT:   Head: Normocephalic.   Nose: Nose normal.   Mouth/Throat: Oropharynx is clear and moist.   Neck: No JVD present. No thyromegaly present.   Cardiovascular: Normal rate, regular rhythm, normal heart sounds and intact distal pulses.   Pulmonary/Chest: Normal expansion, hyperinflation, symmetric chest wall expansion, effort normal and breath sounds normal.   Abdominal: Soft. Bowel sounds are normal.   Musculoskeletal:         General: Normal range of motion.      Cervical back: Normal range of motion and neck supple.   Lymphadenopathy: No supraclavicular adenopathy is present.     He has no cervical adenopathy.   Neurological: He is alert and oriented to person, place, and time. He has normal reflexes.   Skin: Skin is warm and dry.   Psychiatric: He has a normal mood and affect. His behavior is normal.     Personal Diagnostic Review  CT chest showed a 1.3 cm nodule last time chest x-ray is only in be obtained today to review repeat CT in 6 weeks    No flowsheet data found.      Assessment:       1. Localized swelling, mass and lump, trunk    2. Interstitial lung disease        Outpatient Encounter Medications as of 5/12/2022   Medication Sig Dispense Refill    albuterol (VENTOLIN HFA) 90 mcg/actuation inhaler Inhale 2 puffs into the lungs every 6 (six) hours as needed for Wheezing. Rescue (Patient not taking: Reported on 3/13/2022) 18 g 5    atorvastatin (LIPITOR) 40 MG tablet Take 1 tablet (40 mg total) by mouth nightly. 90 tablet 1    esomeprazole (NEXIUM) 40 MG capsule TAKE 1 CAPSULE TWICE DAILY 180 capsule 1    ezetimibe (ZETIA) 10 mg tablet TAKE 1 TABLET DAILY 90 tablet 1    fluticasone propionate (FLONASE) 50 mcg/actuation nasal spray 2 sprays (100 mcg total) by Each Nostril route once daily. 48 g 1    naproxen  (NAPROSYN) 500 MG tablet Take 1 tablet (500 mg total) by mouth 2 (two) times daily with meals. 20 tablet 0    SPIRIVA WITH HANDIHALER 18 mcg inhalation capsule INHALE THE CONTENTS OF 1   CAPSULE (VIA 2 INHALATIONS)DAILY 90 capsule 1    tadalafiL (CIALIS) 10 MG tablet 1 tablet as needed      tadalafiL (CIALIS) 20 MG Tab Take 1 tablet (20 mg total) by mouth daily as needed. 24 tablet 3    tiotropium bromide (SPIRIVA RESPIMAT) 2.5 mcg/actuation inhaler 2 puffs      valsartan-hydrochlorothiazide (DIOVAN-HCT) 160-12.5 mg per tablet Take 1 tablet by mouth once daily. 90 tablet 1    WIXELA INHUB 250-50 mcg/dose diskus inhaler USE 1 INHALATION ORALLY    TWICE DAILY 180 each 1     No facility-administered encounter medications on file as of 5/12/2022.     Orders Placed This Encounter   Procedures    CT Chest Without Contrast     Standing Status:   Future     Standing Expiration Date:   5/12/2023     Order Specific Question:   May the Radiologist modify the order per protocol to meet the clinical needs of the patient?     Answer:   Yes     Order Specific Question:   Access port per protocol?     Answer:   No    X-Ray Chest PA And Lateral     Standing Status:   Future     Number of Occurrences:   1     Standing Expiration Date:   5/12/2023     Order Specific Question:   May the Radiologist modify the order per protocol to meet the clinical needs of the patient?     Answer:   Yes       Plan:       Problem List Items Addressed This Visit        Pulmonary    Interstitial lung disease     Patient has some mild restrictive changes on his pulmonary function test but no evidence of of decreased DLCO on his CT scan there is no evidence of any abnormality except there is a 1.3 cm lesion that looks infiltrative you were going to get x-ray today CBC it if not will repeat CT in 3 months to make sure there is no evidence of this mass if it is still there may have to consider biopsy             Other Visit Diagnoses     Localized  swelling, mass and lump, trunk        Relevant Orders    CT Chest Without Contrast    X-Ray Chest PA And Lateral

## 2022-05-26 NOTE — PROGRESS NOTES
PCP: Vishnu Ballesteros MD    Referring Provider:     Subjective:   Mikhail Stanford is a 67 y.o. male with hx of HTN, HLD who presents for followup    5/27/22 - Patient states he has some shortness of breath.  He does not exercise often. Blood pressure controlled.  Has follow up with Dr. Castellano     2/14/22 - Patient of Dr. Castellano. Patient reports chronic issues with shortness of breath at rest and with exertion. Associated with runny nose. Episode occur daily. For last 2 years.     Fhx: non-contributary  Shx: Denies smoking, Hx of remote drug user (>30 years). No etoh    EKG 2/14/22 - NSR - normal  ECHO 2/2022 - normal LV and size fxn. Mild LAE, mitral valve thickening without stenosis. Indeterminate diastolic dysfunction  STRESS TEST 2/14/22 - negative for ischemia.  The patient exercised for 6 minutes 43 seconds, functional capacity of 8 METS.  The patient reported shortness of breath during the stress test. Denied chest pain/pressure/tightness.       Lab Results   Component Value Date     10/04/2021    K 4.4 10/04/2021     10/04/2021    CO2 31 10/04/2021    BUN 28 (H) 10/04/2021    CREATININE 1.53 (H) 10/04/2021    CALCIUM 9.0 10/04/2021    ANIONGAP 9 10/04/2021    ESTGFRAFRICA 59 (L) 10/04/2021       Lab Results   Component Value Date    CHOL 126 10/04/2021     Lab Results   Component Value Date    HDL 67 (H) 10/04/2021     Lab Results   Component Value Date    LDLCALC 49 10/04/2021     Lab Results   Component Value Date    TRIG 49 10/04/2021     Lab Results   Component Value Date    CHOLHDL 1.9 10/04/2021       Lab Results   Component Value Date    WBC 5.15 10/04/2021    HGB 13.7 10/04/2021    HCT 41.8 10/04/2021    MCV 91.1 10/04/2021     10/04/2021           Review of Systems   Respiratory: Negative for cough and shortness of breath.    Cardiovascular: Positive for leg swelling. Negative for chest pain, palpitations, orthopnea, claudication and PND.         Objective:   /70   Pulse  "74   Resp 18   Ht 5' 11" (1.803 m)   Wt 101.2 kg (223 lb)   BMI 31.10 kg/m²     Physical Exam  Vitals and nursing note reviewed.   Cardiovascular:      Rate and Rhythm: Normal rate and regular rhythm.      Pulses: Normal pulses.      Heart sounds: Normal heart sounds.   Pulmonary:      Breath sounds: Normal breath sounds.   Neurological:      Mental Status: He is oriented to person, place, and time.           Assessment:     1. SOB (shortness of breath)     2. Hypertension, unspecified type           Plan:   SOB (shortness of breath)  Chronic; likely due to decondition  Stress test - 7 mins; 8 METs  CT chest without  Coronary calcification.   Follows Dr. Castellano; pulmonary workup bland.   ECHO with normal LVEF; indeterminate diastolic dysfunction      Hypertension  Chronic controlled        "

## 2022-05-27 ENCOUNTER — OFFICE VISIT (OUTPATIENT)
Dept: CARDIOLOGY | Facility: CLINIC | Age: 67
End: 2022-05-27
Payer: COMMERCIAL

## 2022-05-27 VITALS
HEIGHT: 71 IN | WEIGHT: 223 LBS | HEART RATE: 74 BPM | DIASTOLIC BLOOD PRESSURE: 70 MMHG | BODY MASS INDEX: 31.22 KG/M2 | SYSTOLIC BLOOD PRESSURE: 138 MMHG | RESPIRATION RATE: 18 BRPM

## 2022-05-27 DIAGNOSIS — I10 HYPERTENSION, UNSPECIFIED TYPE: ICD-10-CM

## 2022-05-27 DIAGNOSIS — R06.02 SOB (SHORTNESS OF BREATH): ICD-10-CM

## 2022-05-27 PROCEDURE — 99214 PR OFFICE/OUTPT VISIT, EST, LEVL IV, 30-39 MIN: ICD-10-PCS | Mod: S$PBB,,, | Performed by: STUDENT IN AN ORGANIZED HEALTH CARE EDUCATION/TRAINING PROGRAM

## 2022-05-27 PROCEDURE — 99214 OFFICE O/P EST MOD 30 MIN: CPT | Mod: S$PBB,,, | Performed by: STUDENT IN AN ORGANIZED HEALTH CARE EDUCATION/TRAINING PROGRAM

## 2022-05-27 PROCEDURE — 99214 OFFICE O/P EST MOD 30 MIN: CPT | Mod: PBBFAC | Performed by: STUDENT IN AN ORGANIZED HEALTH CARE EDUCATION/TRAINING PROGRAM

## 2022-05-27 NOTE — ASSESSMENT & PLAN NOTE
Chronic; likely due to decondition  Stress test - 7 mins; 8 METs  CT chest without  Coronary calcification.   Follows Dr. Castellano; pulmonary workup bland.   ECHO with normal LVEF; indeterminate diastolic dysfunction

## 2022-06-13 ENCOUNTER — OFFICE VISIT (OUTPATIENT)
Dept: FAMILY MEDICINE | Facility: CLINIC | Age: 67
End: 2022-06-13
Payer: COMMERCIAL

## 2022-06-13 VITALS
DIASTOLIC BLOOD PRESSURE: 80 MMHG | TEMPERATURE: 99 F | WEIGHT: 221 LBS | HEIGHT: 71 IN | HEART RATE: 92 BPM | RESPIRATION RATE: 18 BRPM | OXYGEN SATURATION: 97 % | BODY MASS INDEX: 30.94 KG/M2 | SYSTOLIC BLOOD PRESSURE: 116 MMHG

## 2022-06-13 DIAGNOSIS — M79.645 PAIN OF LEFT THUMB: ICD-10-CM

## 2022-06-13 DIAGNOSIS — I10 HYPERTENSION, UNSPECIFIED TYPE: ICD-10-CM

## 2022-06-13 DIAGNOSIS — R05.9 COUGH: Primary | ICD-10-CM

## 2022-06-13 DIAGNOSIS — N52.9 ERECTILE DYSFUNCTION, UNSPECIFIED ERECTILE DYSFUNCTION TYPE: ICD-10-CM

## 2022-06-13 LAB
ALBUMIN SERPL BCP-MCNC: 3.3 G/DL (ref 3.5–5)
ALBUMIN/GLOB SERPL: 1 {RATIO}
ALP SERPL-CCNC: 66 U/L (ref 45–115)
ALT SERPL W P-5'-P-CCNC: 41 U/L (ref 16–61)
ANION GAP SERPL CALCULATED.3IONS-SCNC: 12 MMOL/L (ref 7–16)
AST SERPL W P-5'-P-CCNC: 26 U/L (ref 15–37)
BASOPHILS # BLD AUTO: 0.03 K/UL (ref 0–0.2)
BASOPHILS NFR BLD AUTO: 0.5 % (ref 0–1)
BILIRUB SERPL-MCNC: 0.6 MG/DL (ref 0–1.2)
BUN SERPL-MCNC: 26 MG/DL (ref 7–18)
BUN/CREAT SERPL: 15 (ref 6–20)
CALCIUM SERPL-MCNC: 9.7 MG/DL (ref 8.5–10.1)
CHLORIDE SERPL-SCNC: 105 MMOL/L (ref 98–107)
CO2 SERPL-SCNC: 26 MMOL/L (ref 21–32)
CREAT SERPL-MCNC: 1.71 MG/DL (ref 0.7–1.3)
DIFFERENTIAL METHOD BLD: ABNORMAL
EOSINOPHIL # BLD AUTO: 0.12 K/UL (ref 0–0.5)
EOSINOPHIL NFR BLD AUTO: 1.9 % (ref 1–4)
ERYTHROCYTE [DISTWIDTH] IN BLOOD BY AUTOMATED COUNT: 11.8 % (ref 11.5–14.5)
GLOBULIN SER-MCNC: 3.3 G/DL (ref 2–4)
GLUCOSE SERPL-MCNC: 107 MG/DL (ref 74–106)
HCT VFR BLD AUTO: 45.4 % (ref 40–54)
HGB BLD-MCNC: 15.1 G/DL (ref 13.5–18)
IMM GRANULOCYTES # BLD AUTO: 0.01 K/UL (ref 0–0.04)
IMM GRANULOCYTES NFR BLD: 0.2 % (ref 0–0.4)
LYMPHOCYTES # BLD AUTO: 2.06 K/UL (ref 1–4.8)
LYMPHOCYTES NFR BLD AUTO: 33 % (ref 27–41)
MCH RBC QN AUTO: 30 PG (ref 27–31)
MCHC RBC AUTO-ENTMCNC: 33.3 G/DL (ref 32–36)
MCV RBC AUTO: 90.1 FL (ref 80–96)
MONOCYTES # BLD AUTO: 0.68 K/UL (ref 0–0.8)
MONOCYTES NFR BLD AUTO: 10.9 % (ref 2–6)
MPC BLD CALC-MCNC: 9.8 FL (ref 9.4–12.4)
NEUTROPHILS # BLD AUTO: 3.34 K/UL (ref 1.8–7.7)
NEUTROPHILS NFR BLD AUTO: 53.5 % (ref 53–65)
NRBC # BLD AUTO: 0 X10E3/UL
NRBC, AUTO (.00): 0 %
PLATELET # BLD AUTO: 249 K/UL (ref 150–400)
POTASSIUM SERPL-SCNC: 4 MMOL/L (ref 3.5–5.1)
PROT SERPL-MCNC: 6.6 G/DL (ref 6.4–8.2)
RBC # BLD AUTO: 5.04 M/UL (ref 4.6–6.2)
SODIUM SERPL-SCNC: 139 MMOL/L (ref 136–145)
WBC # BLD AUTO: 6.24 K/UL (ref 4.5–11)

## 2022-06-13 PROCEDURE — 99214 OFFICE O/P EST MOD 30 MIN: CPT | Mod: ,,, | Performed by: FAMILY MEDICINE

## 2022-06-13 PROCEDURE — 85025 CBC WITH DIFFERENTIAL: ICD-10-PCS | Mod: ,,, | Performed by: CLINICAL MEDICAL LABORATORY

## 2022-06-13 PROCEDURE — 80053 COMPREHENSIVE METABOLIC PANEL: ICD-10-PCS | Mod: ,,, | Performed by: CLINICAL MEDICAL LABORATORY

## 2022-06-13 PROCEDURE — 85025 COMPLETE CBC W/AUTO DIFF WBC: CPT | Mod: ,,, | Performed by: CLINICAL MEDICAL LABORATORY

## 2022-06-13 PROCEDURE — 80053 COMPREHEN METABOLIC PANEL: CPT | Mod: ,,, | Performed by: CLINICAL MEDICAL LABORATORY

## 2022-06-13 PROCEDURE — 99214 PR OFFICE/OUTPT VISIT, EST, LEVL IV, 30-39 MIN: ICD-10-PCS | Mod: ,,, | Performed by: FAMILY MEDICINE

## 2022-06-13 RX ORDER — FLUTICASONE PROPIONATE AND SALMETEROL 250; 50 UG/1; UG/1
1 POWDER RESPIRATORY (INHALATION) DAILY
COMMUNITY
End: 2022-06-13 | Stop reason: SDUPTHER

## 2022-06-13 RX ORDER — FLUTICASONE PROPIONATE AND SALMETEROL 250; 50 UG/1; UG/1
1 POWDER RESPIRATORY (INHALATION) DAILY
Qty: 60 EACH | Refills: 1 | Status: SHIPPED | OUTPATIENT
Start: 2022-06-13 | End: 2022-08-09 | Stop reason: SDUPTHER

## 2022-06-13 RX ORDER — TADALAFIL 20 MG/1
20 TABLET ORAL DAILY PRN
Qty: 24 TABLET | Refills: 3 | Status: SHIPPED | OUTPATIENT
Start: 2022-06-13 | End: 2022-07-25 | Stop reason: SDUPTHER

## 2022-06-13 RX ORDER — AMLODIPINE BESYLATE 5 MG/1
5 TABLET ORAL DAILY
Qty: 30 TABLET | Refills: 2 | Status: SHIPPED | OUTPATIENT
Start: 2022-06-13 | End: 2023-01-30

## 2022-06-13 RX ORDER — HYDROCHLOROTHIAZIDE 12.5 MG/1
12.5 TABLET ORAL DAILY
Qty: 30 TABLET | Refills: 2 | Status: SHIPPED | OUTPATIENT
Start: 2022-06-13 | End: 2023-01-30

## 2022-06-13 NOTE — PROGRESS NOTES
Subjective:       Patient ID: Mikhail Stanford is a 67 y.o. male.    Chief Complaint: Cough (Reports productive with clear sputum, reports ongoing for several months ), Medication Refill, and Thumb Pain (Left, reports chronic, unsure if he has had xray)    Mikhail Stanford is a 67 year old male with a past medical history of hypertension who presents to the clinic today for follow up and complaints of a chronic cough. He reports his cough is chronic in nature but for the past two to three months has been associated with clear mucus and hoarseness. He states he has tried many over the counter medications with no relief of his symptoms. He denies fever, chills, rhinorrhea, sore throat, shortness of breath, chest pain, nausea, vomiting, diarrhea, myalgias, recent travel or sick contacts.     Review of Systems   Constitutional: Negative for chills and fever.   HENT: Negative for ear pain, postnasal drip, rhinorrhea, sinus pressure/congestion and sore throat.    Respiratory: Positive for cough. Negative for shortness of breath and wheezing.    Cardiovascular: Negative for chest pain and palpitations.   Gastrointestinal: Negative for diarrhea, nausea and vomiting.   Musculoskeletal: Negative for myalgias.   Allergic/Immunologic: Positive for environmental allergies.         Objective:      Physical Exam  Constitutional:       General: He is not in acute distress.     Appearance: Normal appearance.   HENT:      Head: Normocephalic and atraumatic.      Right Ear: Tympanic membrane normal. No middle ear effusion. There is no impacted cerumen.      Left Ear: Tympanic membrane normal.  No middle ear effusion. There is no impacted cerumen.      Nose:      Right Turbinates: Swollen.      Left Turbinates: Not swollen.      Right Sinus: No maxillary sinus tenderness or frontal sinus tenderness.      Left Sinus: No maxillary sinus tenderness or frontal sinus tenderness.      Mouth/Throat:      Mouth: Mucous membranes are moist.       Pharynx: Oropharynx is clear. Uvula midline. No oropharyngeal exudate.      Tonsils: No tonsillar exudate.   Eyes:      Extraocular Movements: Extraocular movements intact.      Conjunctiva/sclera: Conjunctivae normal.      Pupils: Pupils are equal, round, and reactive to light.   Neck:      Vascular: No carotid bruit.   Cardiovascular:      Rate and Rhythm: Normal rate and regular rhythm.      Heart sounds: Normal heart sounds. No murmur heard.    No friction rub. No gallop.   Pulmonary:      Effort: Pulmonary effort is normal.      Breath sounds: Normal breath sounds. No wheezing, rhonchi or rales.   Musculoskeletal:      Cervical back: Normal range of motion and neck supple. No tenderness.   Lymphadenopathy:      Cervical: No cervical adenopathy.   Skin:     General: Skin is warm and dry.      Coloration: Skin is not jaundiced.   Neurological:      Mental Status: He is alert.   Psychiatric:         Mood and Affect: Mood normal.         Behavior: Behavior normal.         Thought Content: Thought content normal.         Judgment: Judgment normal.         Assessment:       Problem List Items Addressed This Visit        Cardiac/Vascular    Hypertension    Relevant Medications    amLODIPine (NORVASC) 5 MG tablet    Other Relevant Orders    CBC Auto Differential    Comprehensive Metabolic Panel      Other Visit Diagnoses     Cough    -  Primary    Relevant Medications    fluticasone-salmeterol diskus inhaler 250-50 mcg    Pain of left thumb        Erectile dysfunction, unspecified erectile dysfunction type        Relevant Medications    tadalafiL (CIALIS) 20 MG Tab          Plan:       Discontinue Diovan-HCTZ due to chronic cough  Start Amlodipine 5mg tablet   Patient instructed to monitor blood pressure twice daily and bring log to next appointment  CBC and CMP drawn today, will call patient with results  Follow-up in 2 weeks

## 2022-06-14 ENCOUNTER — TELEPHONE (OUTPATIENT)
Dept: FAMILY MEDICINE | Facility: CLINIC | Age: 67
End: 2022-06-14
Payer: COMMERCIAL

## 2022-06-14 NOTE — TELEPHONE ENCOUNTER
----- Message from Vishnu Ballesteros MD sent at 6/14/2022  7:52 AM CDT -----  Patient's renal function is declining he appears to be dry.  Please make sure he follows up in 2 weeks.

## 2022-06-23 ENCOUNTER — HOSPITAL ENCOUNTER (OUTPATIENT)
Dept: RADIOLOGY | Facility: HOSPITAL | Age: 67
Discharge: HOME | End: 2022-06-23
Attending: INTERNAL MEDICINE
Payer: COMMERCIAL

## 2022-06-23 DIAGNOSIS — R22.2 LOCALIZED SWELLING, MASS AND LUMP, TRUNK: ICD-10-CM

## 2022-06-23 PROCEDURE — 71250 CT THORAX DX C-: CPT | Mod: TC

## 2022-06-23 PROCEDURE — 71250 CT CHEST WITHOUT CONTRAST: ICD-10-PCS | Mod: 26,,, | Performed by: STUDENT IN AN ORGANIZED HEALTH CARE EDUCATION/TRAINING PROGRAM

## 2022-06-23 PROCEDURE — 71250 CT THORAX DX C-: CPT | Mod: 26,,, | Performed by: STUDENT IN AN ORGANIZED HEALTH CARE EDUCATION/TRAINING PROGRAM

## 2022-06-27 ENCOUNTER — OFFICE VISIT (OUTPATIENT)
Dept: FAMILY MEDICINE | Facility: CLINIC | Age: 67
End: 2022-06-27
Payer: COMMERCIAL

## 2022-06-27 VITALS
WEIGHT: 221 LBS | DIASTOLIC BLOOD PRESSURE: 94 MMHG | SYSTOLIC BLOOD PRESSURE: 140 MMHG | OXYGEN SATURATION: 98 % | BODY MASS INDEX: 30.94 KG/M2 | RESPIRATION RATE: 18 BRPM | HEIGHT: 71 IN | TEMPERATURE: 99 F | HEART RATE: 87 BPM

## 2022-06-27 DIAGNOSIS — I10 HYPERTENSION, UNSPECIFIED TYPE: Primary | ICD-10-CM

## 2022-06-27 DIAGNOSIS — J44.9 CHRONIC OBSTRUCTIVE PULMONARY DISEASE, UNSPECIFIED COPD TYPE: ICD-10-CM

## 2022-06-27 PROCEDURE — 99212 PR OFFICE/OUTPT VISIT, EST, LEVL II, 10-19 MIN: ICD-10-PCS | Mod: ,,, | Performed by: FAMILY MEDICINE

## 2022-06-27 PROCEDURE — 99212 OFFICE O/P EST SF 10 MIN: CPT | Mod: ,,, | Performed by: FAMILY MEDICINE

## 2022-06-27 NOTE — PROGRESS NOTES
Agree with above.  For the meantime while he is taking Breztri, he will hold the Advair and the Spiriva.

## 2022-06-27 NOTE — PROGRESS NOTES
Subjective:       Patient ID: Mikhail Stanford is a 67 y.o. male.    Chief Complaint: Follow-up (2 week, lab review and bp )    Shoaib Stanford is a 67 year old male with a past medical history of hypertension who presents today for follow up on his hypertension. He states that since starting the Amlodipine his blood pressure readings at home have worsened with systolic readings in the 160's and diastolic readings in the low 100's and his chronic cough has not improved. Additionally he states that he has stopped using his Advair inhaler because he feels that it has not helped. Upon further review of his chart it was noted that his most recent chest CT did indicate mild emphysema. He denies fever, chills, shortness of breath, nausea, vomiting, diarrhea or lower extremity edema.     Follow-up  Associated symptoms include coughing (Chronic). Pertinent negatives include no chest pain, chills, fever, nausea or vomiting.     Review of Systems   Constitutional: Negative for chills and fever.   HENT: Negative for rhinorrhea and sinus pressure/congestion.    Respiratory: Positive for cough (Chronic). Negative for shortness of breath.    Cardiovascular: Negative for chest pain and leg swelling.   Gastrointestinal: Negative for diarrhea, nausea and vomiting.         Objective:      Physical Exam  Constitutional:       Appearance: Normal appearance.   HENT:      Nose: Nose normal. No rhinorrhea.      Mouth/Throat:      Mouth: Mucous membranes are moist.      Pharynx: Oropharynx is clear. No oropharyngeal exudate or posterior oropharyngeal erythema.   Eyes:      Extraocular Movements: Extraocular movements intact.      Pupils: Pupils are equal, round, and reactive to light.   Cardiovascular:      Rate and Rhythm: Normal rate and regular rhythm.      Pulses: Normal pulses.      Heart sounds: Normal heart sounds.   Pulmonary:      Effort: Pulmonary effort is normal.      Breath sounds: Normal breath sounds. No wheezing, rhonchi or  rales.   Musculoskeletal:      Cervical back: Normal range of motion and neck supple.   Lymphadenopathy:      Cervical: No cervical adenopathy.   Skin:     General: Skin is warm.      Coloration: Skin is not jaundiced.   Neurological:      Mental Status: He is alert.   Psychiatric:         Mood and Affect: Mood normal.         Behavior: Behavior normal.         Thought Content: Thought content normal.         Judgment: Judgment normal.         Assessment:       Problem List Items Addressed This Visit        Cardiac/Vascular    Hypertension - Primary      Other Visit Diagnoses     Chronic obstructive pulmonary disease, unspecified COPD type              Plan:       Stop Amlodipine 5 mg PO daily  Start Diovan-HCTZ    Patient given sample of Breztri 2 puffs twice daily   Follow-up in 2 weeks

## 2022-07-25 ENCOUNTER — OFFICE VISIT (OUTPATIENT)
Dept: FAMILY MEDICINE | Facility: CLINIC | Age: 67
End: 2022-07-25
Payer: COMMERCIAL

## 2022-07-25 VITALS
OXYGEN SATURATION: 99 % | DIASTOLIC BLOOD PRESSURE: 77 MMHG | WEIGHT: 221 LBS | BODY MASS INDEX: 30.94 KG/M2 | HEIGHT: 71 IN | HEART RATE: 96 BPM | SYSTOLIC BLOOD PRESSURE: 125 MMHG | RESPIRATION RATE: 19 BRPM

## 2022-07-25 DIAGNOSIS — N52.9 ERECTILE DYSFUNCTION, UNSPECIFIED ERECTILE DYSFUNCTION TYPE: ICD-10-CM

## 2022-07-25 DIAGNOSIS — E78.5 HYPERLIPIDEMIA, UNSPECIFIED HYPERLIPIDEMIA TYPE: ICD-10-CM

## 2022-07-25 DIAGNOSIS — J44.9 CHRONIC OBSTRUCTIVE PULMONARY DISEASE, UNSPECIFIED COPD TYPE: Primary | ICD-10-CM

## 2022-07-25 PROCEDURE — 99213 OFFICE O/P EST LOW 20 MIN: CPT | Mod: ,,, | Performed by: FAMILY MEDICINE

## 2022-07-25 PROCEDURE — 99213 PR OFFICE/OUTPT VISIT, EST, LEVL III, 20-29 MIN: ICD-10-PCS | Mod: ,,, | Performed by: FAMILY MEDICINE

## 2022-07-25 RX ORDER — EZETIMIBE 10 MG/1
10 TABLET ORAL DAILY
Qty: 90 TABLET | Refills: 1 | Status: SHIPPED | OUTPATIENT
Start: 2022-07-25 | End: 2023-01-10

## 2022-07-25 RX ORDER — ATORVASTATIN CALCIUM 40 MG/1
40 TABLET, FILM COATED ORAL NIGHTLY
Qty: 90 TABLET | Refills: 1 | Status: SHIPPED | OUTPATIENT
Start: 2022-07-25 | End: 2022-11-03

## 2022-07-25 RX ORDER — TADALAFIL 20 MG/1
20 TABLET ORAL DAILY PRN
Qty: 24 TABLET | Refills: 3 | Status: SHIPPED | OUTPATIENT
Start: 2022-07-25 | End: 2023-02-14 | Stop reason: SDUPTHER

## 2022-07-25 NOTE — PROGRESS NOTES
Mikhail Stanford is a 67 y.o. male seen today for follow-up on his hypertension.  Patient is tolerating his current medication well with good blood pressure readings.  Patient's edema has markedly improved.  He denies any chest pain but continues to have shortness of breath with persistent cough and chest congestion despite treatment with Breztri.  He reports his use of Advair and Spiriva worked better for him.  Still patient remained symptomatic and I recommended to follow-up with his pulmonologist.      Past Medical History:   Diagnosis Date    COPD (chronic obstructive pulmonary disease)     Hypertension      Family History   Problem Relation Age of Onset    Cancer Mother     Diabetes Sister     Hyperlipidemia Sister      Current Outpatient Medications on File Prior to Visit   Medication Sig Dispense Refill    amLODIPine (NORVASC) 5 MG tablet Take 1 tablet (5 mg total) by mouth once daily. 30 tablet 2    esomeprazole (NEXIUM) 40 MG capsule TAKE 1 CAPSULE TWICE DAILY 180 capsule 1    fluticasone propionate (FLONASE) 50 mcg/actuation nasal spray 2 sprays (100 mcg total) by Each Nostril route once daily. 48 g 1    fluticasone-salmeterol diskus inhaler 250-50 mcg Inhale 1 puff into the lungs once daily at 6am. 60 each 1    hydroCHLOROthiazide (HYDRODIURIL) 12.5 MG Tab Take 1 tablet (12.5 mg total) by mouth once daily. 30 tablet 2    SPIRIVA WITH HANDIHALER 18 mcg inhalation capsule INHALE THE CONTENTS OF 1   CAPSULE (VIA 2 INHALATIONS)DAILY 90 capsule 1    valsartan-hydrochlorothiazide (DIOVAN-HCT) 160-12.5 mg per tablet TAKE 1 TABLET DAILY 90 tablet 1    [DISCONTINUED] atorvastatin (LIPITOR) 40 MG tablet Take 1 tablet (40 mg total) by mouth nightly. 90 tablet 1    [DISCONTINUED] ezetimibe (ZETIA) 10 mg tablet TAKE 1 TABLET DAILY 90 tablet 1    [DISCONTINUED] tadalafiL (CIALIS) 20 MG Tab Take 1 tablet (20 mg total) by mouth daily as needed. 24 tablet 3     No current facility-administered medications on  file prior to visit.       There is no immunization history on file for this patient.    Review of Systems   Constitutional: Negative for fever, malaise/fatigue and weight loss.   Respiratory: Positive for cough, sputum production and shortness of breath.    Cardiovascular: Negative for chest pain and palpitations.   Gastrointestinal: Negative for nausea and vomiting.   Psychiatric/Behavioral: Negative for depression.        Vitals:    07/25/22 1414   BP: 125/77   Pulse: 96   Resp: 19       Physical Exam  Vitals reviewed.   Constitutional:       Appearance: Normal appearance.   HENT:      Head: Normocephalic.   Eyes:      Extraocular Movements: Extraocular movements intact.      Conjunctiva/sclera: Conjunctivae normal.      Pupils: Pupils are equal, round, and reactive to light.   Neck:      Thyroid: No thyroid mass or thyromegaly.   Cardiovascular:      Rate and Rhythm: Normal rate and regular rhythm.      Heart sounds: Normal heart sounds. No murmur heard.    No gallop.   Pulmonary:      Effort: Pulmonary effort is normal. No respiratory distress.      Breath sounds: Decreased air movement present. Examination of the right-upper field reveals rhonchi. Examination of the left-upper field reveals rhonchi. Decreased breath sounds and rhonchi present. No wheezing or rales.   Skin:     General: Skin is warm and dry.      Coloration: Skin is not jaundiced or pale.   Neurological:      Mental Status: He is alert.   Psychiatric:         Mood and Affect: Mood normal.         Behavior: Behavior normal.         Thought Content: Thought content normal.         Judgment: Judgment normal.          Assessment and Plan  Chronic obstructive pulmonary disease, unspecified COPD type  -     Ambulatory referral/consult to Pulmonology; Future; Expected date: 08/01/2022    Erectile dysfunction, unspecified erectile dysfunction type  -     tadalafiL (CIALIS) 20 MG Tab; Take 1 tablet (20 mg total) by mouth daily as needed (intercourse).   Dispense: 24 tablet; Refill: 3    Hyperlipidemia, unspecified hyperlipidemia type  -     atorvastatin (LIPITOR) 40 MG tablet; Take 1 tablet (40 mg total) by mouth nightly.  Dispense: 90 tablet; Refill: 1  -     ezetimibe (ZETIA) 10 mg tablet; Take 1 tablet (10 mg total) by mouth once daily.  Dispense: 90 tablet; Refill: 1            Return to clinic in 3 months for follow-up or as needed.    Health Maintenance Topics with due status: Not Due       Topic Last Completion Date    Lipid Panel 10/04/2021    Influenza Vaccine Not Due

## 2022-08-04 ENCOUNTER — OFFICE VISIT (OUTPATIENT)
Dept: PULMONOLOGY | Facility: CLINIC | Age: 67
End: 2022-08-04
Payer: COMMERCIAL

## 2022-08-04 VITALS
WEIGHT: 220 LBS | HEART RATE: 114 BPM | DIASTOLIC BLOOD PRESSURE: 76 MMHG | BODY MASS INDEX: 30.8 KG/M2 | SYSTOLIC BLOOD PRESSURE: 116 MMHG | OXYGEN SATURATION: 96 % | HEIGHT: 71 IN | RESPIRATION RATE: 20 BRPM

## 2022-08-04 DIAGNOSIS — J84.9 INTERSTITIAL LUNG DISEASE: ICD-10-CM

## 2022-08-04 DIAGNOSIS — R06.02 SOB (SHORTNESS OF BREATH): ICD-10-CM

## 2022-08-04 DIAGNOSIS — J44.9 CHRONIC OBSTRUCTIVE PULMONARY DISEASE, UNSPECIFIED COPD TYPE: ICD-10-CM

## 2022-08-04 DIAGNOSIS — R91.1 SOLITARY PULMONARY NODULE: ICD-10-CM

## 2022-08-04 DIAGNOSIS — R91.8 LUNG MASS: ICD-10-CM

## 2022-08-04 PROCEDURE — 99215 OFFICE O/P EST HI 40 MIN: CPT | Mod: PBBFAC | Performed by: INTERNAL MEDICINE

## 2022-08-04 PROCEDURE — 99214 PR OFFICE/OUTPT VISIT, EST, LEVL IV, 30-39 MIN: ICD-10-PCS | Mod: S$PBB,,, | Performed by: INTERNAL MEDICINE

## 2022-08-04 PROCEDURE — 99214 OFFICE O/P EST MOD 30 MIN: CPT | Mod: S$PBB,,, | Performed by: INTERNAL MEDICINE

## 2022-08-04 RX ORDER — ALBUTEROL SULFATE 90 UG/1
2 AEROSOL, METERED RESPIRATORY (INHALATION) EVERY 6 HOURS PRN
Qty: 18 G | Refills: 5 | Status: SHIPPED | OUTPATIENT
Start: 2022-08-04 | End: 2022-08-05

## 2022-08-04 NOTE — PROGRESS NOTES
"Subjective:       Patient ID: Mikhail Stanford is a 67 y.o. male.    Chief Complaint: Mass, Follow-up (Interstitial lung disease ), and Shortness of Breath    Mass  This is a chronic problem. The current episode started more than 1 month ago. The problem occurs constantly. The problem has been unchanged. Pertinent negatives include no abdominal pain, arthralgias, chest pain, chills, congestion, headaches or rash. Nothing aggravates the symptoms.   Follow-up  Pertinent negatives include no abdominal pain, arthralgias, chest pain, chills, congestion, headaches or rash.   Shortness of Breath  Pertinent negatives include no abdominal pain, chest pain, ear pain, headaches or rash.     Past Medical History:   Diagnosis Date    COPD (chronic obstructive pulmonary disease)     Hypertension      Past Surgical History:   Procedure Laterality Date    DENTAL SURGERY       Family History   Problem Relation Age of Onset    Cancer Mother     Diabetes Sister     Hyperlipidemia Sister      Review of patient's allergies indicates:  No Known Allergies   Social History     Tobacco Use    Smoking status: Former Smoker     Packs/day: 2.00     Years: 25.00     Pack years: 50.00     Types: Cigarettes    Smokeless tobacco: Former User     Types: Chew     Quit date: 1/27/2006   Substance Use Topics    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   HENT: Negative for congestion and ear pain.    Eyes: Negative for redness and itching.   Respiratory: Positive for shortness of breath.    Cardiovascular: Negative for chest pain and palpitations.   Musculoskeletal: Negative for arthralgias and back pain.   Skin: Negative for rash.   Gastrointestinal: Negative for abdominal pain and abdominal distention.   Neurological: Negative for " dizziness and headaches.   Hematological: Negative for adenopathy. Does not bruise/bleed easily.   Psychiatric/Behavioral: Negative for confusion. The patient is not nervous/anxious.        Objective:      Physical Exam   Constitutional: He is oriented to person, place, and time. He appears well-developed and well-nourished.   HENT:   Head: Normocephalic.   Nose: Nose normal.   Mouth/Throat: Oropharynx is clear and moist.   Neck: No JVD present. No thyromegaly present.   Cardiovascular: Normal rate, regular rhythm, normal heart sounds and intact distal pulses.   Pulmonary/Chest: Normal expansion, hyperinflation, symmetric chest wall expansion, effort normal and breath sounds normal.   Abdominal: Soft. Bowel sounds are normal.   Musculoskeletal:         General: Normal range of motion.      Cervical back: Normal range of motion and neck supple.   Lymphadenopathy: No supraclavicular adenopathy is present.     He has no cervical adenopathy.   Neurological: He is alert and oriented to person, place, and time. He has normal reflexes.   Skin: Skin is warm and dry.   Psychiatric: He has a normal mood and affect. His behavior is normal.     Personal Diagnostic Review  none pertinent    No flowsheet data found.      Assessment:       1. Chronic obstructive pulmonary disease, unspecified COPD type    2. Solitary pulmonary nodule    3. Lung mass    4. SOB (shortness of breath)    5. Interstitial lung disease        Outpatient Encounter Medications as of 8/4/2022   Medication Sig Dispense Refill    amLODIPine (NORVASC) 5 MG tablet Take 1 tablet (5 mg total) by mouth once daily. 30 tablet 2    atorvastatin (LIPITOR) 40 MG tablet Take 1 tablet (40 mg total) by mouth nightly. 90 tablet 1    esomeprazole (NEXIUM) 40 MG capsule TAKE 1 CAPSULE TWICE DAILY 180 capsule 1    ezetimibe (ZETIA) 10 mg tablet Take 1 tablet (10 mg total) by mouth once daily. 90 tablet 1    fluticasone propionate (FLONASE) 50 mcg/actuation nasal spray 2  sprays (100 mcg total) by Each Nostril route once daily. 48 g 1    fluticasone-salmeterol diskus inhaler 250-50 mcg Inhale 1 puff into the lungs once daily at 6am. 60 each 1    hydroCHLOROthiazide (HYDRODIURIL) 12.5 MG Tab Take 1 tablet (12.5 mg total) by mouth once daily. 30 tablet 2    SPIRIVA WITH HANDIHALER 18 mcg inhalation capsule INHALE THE CONTENTS OF 1   CAPSULE (VIA 2 INHALATIONS)DAILY 90 capsule 1    tadalafiL (CIALIS) 20 MG Tab Take 1 tablet (20 mg total) by mouth daily as needed (intercourse). 24 tablet 3    valsartan-hydrochlorothiazide (DIOVAN-HCT) 160-12.5 mg per tablet TAKE 1 TABLET DAILY 90 tablet 1    albuterol (VENTOLIN HFA) 90 mcg/actuation inhaler Inhale 2 puffs into the lungs every 6 (six) hours as needed for Wheezing. Rescue 18 g 5     No facility-administered encounter medications on file as of 8/4/2022.     Orders Placed This Encounter   Procedures    CT Chest Without Contrast     Standing Status:   Future     Standing Expiration Date:   8/4/2023     Order Specific Question:   May the Radiologist modify the order per protocol to meet the clinical needs of the patient?     Answer:   Yes     Order Specific Question:   Access port per protocol?     Answer:   No       Plan:       Problem List Items Addressed This Visit        Pulmonary    SOB (shortness of breath)     Patient has COPD which seems to be doing reasonably well at this time no changes in regimen other than some difficulty with secretions use Ventolin for that continue on subacute her on Spiriva does not smoke needs to lose weight increase activity           Interstitial lung disease     Infiltrative lesion from last CT in June will repeat it knocked over to make sure we do not have to do any further workup such as a bronchoscopy           Chronic obstructive pulmonary disease     Noted stable           Lung mass      Other Visit Diagnoses     Solitary pulmonary nodule        Relevant Orders    CT Chest Without Contrast

## 2022-08-04 NOTE — ASSESSMENT & PLAN NOTE
Patient has COPD which seems to be doing reasonably well at this time no changes in regimen other than some difficulty with secretions use Ventolin for that continue on subacute her on Spiriva does not smoke needs to lose weight increase activity

## 2022-08-04 NOTE — ASSESSMENT & PLAN NOTE
Infiltrative lesion from last CT in June will repeat it knocked over to make sure we do not have to do any further workup such as a bronchoscopy

## 2022-08-15 RX ORDER — ESOMEPRAZOLE MAGNESIUM 40 MG/1
40 CAPSULE, DELAYED RELEASE ORAL 2 TIMES DAILY
Qty: 180 CAPSULE | Refills: 1 | Status: SHIPPED | OUTPATIENT
Start: 2022-08-15 | End: 2023-01-30 | Stop reason: SDUPTHER

## 2022-09-09 ENCOUNTER — OFFICE VISIT (OUTPATIENT)
Dept: OTOLARYNGOLOGY | Facility: CLINIC | Age: 67
End: 2022-09-09
Payer: COMMERCIAL

## 2022-09-09 VITALS — WEIGHT: 220 LBS | BODY MASS INDEX: 30.8 KG/M2 | HEIGHT: 71 IN

## 2022-09-09 DIAGNOSIS — K21.9 GASTROESOPHAGEAL REFLUX DISEASE, UNSPECIFIED WHETHER ESOPHAGITIS PRESENT: Primary | ICD-10-CM

## 2022-09-09 DIAGNOSIS — R13.10 DYSPHAGIA, UNSPECIFIED TYPE: ICD-10-CM

## 2022-09-09 DIAGNOSIS — R49.0 HOARSENESS: ICD-10-CM

## 2022-09-09 PROCEDURE — 31575 DIAGNOSTIC LARYNGOSCOPY: CPT | Mod: S$PBB,,, | Performed by: OTOLARYNGOLOGY

## 2022-09-09 PROCEDURE — 99204 OFFICE O/P NEW MOD 45 MIN: CPT | Mod: S$PBB,25,, | Performed by: OTOLARYNGOLOGY

## 2022-09-09 PROCEDURE — 31575 PR LARYNGOSCOPY, FLEXIBLE; DIAGNOSTIC: ICD-10-PCS | Mod: S$PBB,,, | Performed by: OTOLARYNGOLOGY

## 2022-09-09 PROCEDURE — 31575 DIAGNOSTIC LARYNGOSCOPY: CPT | Mod: PBBFAC | Performed by: OTOLARYNGOLOGY

## 2022-09-09 PROCEDURE — 99204 PR OFFICE/OUTPT VISIT, NEW, LEVL IV, 45-59 MIN: ICD-10-PCS | Mod: S$PBB,25,, | Performed by: OTOLARYNGOLOGY

## 2022-09-09 PROCEDURE — 99213 OFFICE O/P EST LOW 20 MIN: CPT | Mod: PBBFAC,25 | Performed by: OTOLARYNGOLOGY

## 2022-09-09 RX ORDER — PANTOPRAZOLE SODIUM 20 MG/1
20 TABLET, DELAYED RELEASE ORAL DAILY
Qty: 30 TABLET | Refills: 11 | Status: SHIPPED | OUTPATIENT
Start: 2022-09-09 | End: 2023-06-28

## 2022-09-09 NOTE — PROGRESS NOTES
Subjective:       Patient ID: Mikhail Stanford is a 67 y.o. male.    Chief Complaint: Sore Throat (Pt states throat feels like there is something in there, coughs and clears throat frequently, states thick yellowish mucus at times. ) and Hoarse (Voice changes at times. )    Sore Throat   Associated symptoms include trouble swallowing.   Review of Systems   HENT:  Positive for sore throat, trouble swallowing and voice change.    All other systems reviewed and are negative.    Objective:      Physical Exam  General: NAD Hoarse   Head: Normocephalic, atraumatic, no facial asymmetry/normal strength,  Ears: Both auricules normal in appearance, w/o deformities tympanic membranes normal external auditory canals normal  Nose: External nose w/o deformities normal turbinates no drainage or inflammation  Oral Cavity: Lips, gums, floor of mouth, tongue hard palate, and buccal mucosa without mass/lesion  Oropharynx: Mucosa pink and moist, soft palate, posterior pharynx and oropharyngeal wall without mass/lesion  Neck: Supple, symmetric, trachea midline, no palpable mass/lesion, no palpable cervical lymphadenopathy  Skin: Warm and dry, no concerning lesions  Respiratory: Respirations even, unlabored     Nasal/Nasopharyngo/Laryn/Hypopharyngoscopy Procedures    Procedure:  Diagnostic nasal, nasopharyngoscopy, laryngoscopy and hypopharyngoscopy.    Routine preparation with local atomizer with 1% neosynephrine and lidocaine . With customary flexible endoscope.     NOSE:   External:  No gross deformity   Intranasal:    Mucosa:  No polyps, ulcers or lesions.    Septum:  No gross deformity.    Turbinates:  Not enlarged.    Nasopharynx:  No lesions.   Mucosa:  No lesions.   Posterior Choanae:  Patent.   Eustachian Tubes:  Patent.  Larynx/hypopharynx:   Epiglottis:  No lesions, without edema.   AE Folds:  No lesions.   Vocal cords:  No polyps; nl mobility   Subglottis: No obvious stenosis   Hypopharynx:  No lesions.   Piriform sinus:  No  pooling or lesions.   Post Cricoid: + edema & erythema       Assessment:       1. Gastroesophageal reflux disease, unspecified whether esophagitis present    2. Dysphagia, unspecified type    3. Hoarseness          Plan:       Protonix may need to see GI   F/u 2 months

## 2022-12-05 ENCOUNTER — OFFICE VISIT (OUTPATIENT)
Dept: PULMONOLOGY | Facility: CLINIC | Age: 67
End: 2022-12-05
Payer: COMMERCIAL

## 2022-12-05 ENCOUNTER — HOSPITAL ENCOUNTER (OUTPATIENT)
Dept: RADIOLOGY | Facility: HOSPITAL | Age: 67
Discharge: HOME | End: 2022-12-05
Attending: INTERNAL MEDICINE
Payer: COMMERCIAL

## 2022-12-05 VITALS
HEART RATE: 96 BPM | HEIGHT: 71 IN | DIASTOLIC BLOOD PRESSURE: 64 MMHG | BODY MASS INDEX: 30.8 KG/M2 | OXYGEN SATURATION: 97 % | SYSTOLIC BLOOD PRESSURE: 116 MMHG | WEIGHT: 220 LBS | RESPIRATION RATE: 20 BRPM

## 2022-12-05 DIAGNOSIS — R22.2 LOCALIZED SWELLING, MASS AND LUMP, TRUNK: Primary | ICD-10-CM

## 2022-12-05 DIAGNOSIS — R91.1 SOLITARY PULMONARY NODULE: ICD-10-CM

## 2022-12-05 DIAGNOSIS — R91.8 LUNG MASS: ICD-10-CM

## 2022-12-05 PROCEDURE — 99215 OFFICE O/P EST HI 40 MIN: CPT | Mod: PBBFAC,25 | Performed by: INTERNAL MEDICINE

## 2022-12-05 PROCEDURE — 71250 CT THORAX DX C-: CPT | Mod: TC

## 2022-12-05 PROCEDURE — 71250 CT CHEST WITHOUT CONTRAST: ICD-10-PCS | Mod: 26,,, | Performed by: STUDENT IN AN ORGANIZED HEALTH CARE EDUCATION/TRAINING PROGRAM

## 2022-12-05 PROCEDURE — 99213 OFFICE O/P EST LOW 20 MIN: CPT | Mod: S$PBB,,, | Performed by: INTERNAL MEDICINE

## 2022-12-05 PROCEDURE — 99213 PR OFFICE/OUTPT VISIT, EST, LEVL III, 20-29 MIN: ICD-10-PCS | Mod: S$PBB,,, | Performed by: INTERNAL MEDICINE

## 2022-12-05 PROCEDURE — 71250 CT THORAX DX C-: CPT | Mod: 26,,, | Performed by: STUDENT IN AN ORGANIZED HEALTH CARE EDUCATION/TRAINING PROGRAM

## 2022-12-05 NOTE — PROGRESS NOTES
"Subjective:       Patient ID: Mikhail Stanford is a 67 y.o. male.    Chief Complaint: COPD (4mo with ct)    COPD  This is a chronic problem. The current episode started more than 1 month ago. The problem occurs constantly. The problem has been unchanged. Pertinent negatives include no abdominal pain, arthralgias, chest pain, chills, congestion, headaches or rash.   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   HENT:  Negative for congestion and ear pain.    Eyes:  Negative for redness and itching.   Cardiovascular:  Negative for chest pain and palpitations.   Musculoskeletal:  Negative for arthralgias and back pain.   Skin:  Negative for rash.   Gastrointestinal:  Negative for abdominal pain and abdominal distention.   Neurological:  Negative for dizziness and headaches.   Hematological:  Negative for adenopathy. Does not bruise/bleed easily.   Psychiatric/Behavioral:  Negative for confusion. The patient is not nervous/anxious.      Objective:      Physical Exam   Constitutional: He is oriented to person, place, and time. He appears well-developed and well-nourished.   HENT:   Head: Normocephalic.   Nose: Nose normal.   Mouth/Throat: " Oropharynx is clear and moist.   Neck: No JVD present. No thyromegaly present.   Cardiovascular: Normal rate, regular rhythm, normal heart sounds and intact distal pulses.   Pulmonary/Chest: Normal expansion, hyperinflation, symmetric chest wall expansion, effort normal and breath sounds normal.   Abdominal: Soft. Bowel sounds are normal.   Musculoskeletal:         General: Normal range of motion.      Cervical back: Normal range of motion and neck supple.   Lymphadenopathy: No supraclavicular adenopathy is present.     He has no cervical adenopathy.   Neurological: He is alert and oriented to person, place, and time. He has normal reflexes.   Skin: Skin is warm and dry.   Psychiatric: He has a normal mood and affect. His behavior is normal.   Personal Diagnostic Review  none pertinent    No flowsheet data found.      Assessment:       1. Localized swelling, mass and lump, trunk    2. Lung mass          Outpatient Encounter Medications as of 12/5/2022   Medication Sig Dispense Refill    ADVAIR DISKUS 250-50 mcg/dose diskus inhaler USE 1 INHALATION ORALLY    ONCE DAILY AT 6AM 90 each 1    albuterol (PROVENTIL/VENTOLIN HFA) 90 mcg/actuation inhaler Inhale 1 puff into the lungs every 6 (six) hours as needed for Wheezing. 18 g 3    amLODIPine (NORVASC) 5 MG tablet Take 1 tablet (5 mg total) by mouth once daily. 30 tablet 2    atorvastatin (LIPITOR) 40 MG tablet TAKE 1 TABLET NIGHTLY 80 tablet 1    esomeprazole (NEXIUM) 40 MG capsule Take 1 capsule (40 mg total) by mouth 2 (two) times daily. 180 capsule 1    ezetimibe (ZETIA) 10 mg tablet Take 1 tablet (10 mg total) by mouth once daily. 90 tablet 1    fluticasone propionate (FLONASE) 50 mcg/actuation nasal spray 2 sprays (100 mcg total) by Each Nostril route once daily. 48 g 1    hydroCHLOROthiazide (HYDRODIURIL) 12.5 MG Tab Take 1 tablet (12.5 mg total) by mouth once daily. 30 tablet 2    pantoprazole (PROTONIX) 20 MG tablet Take 1 tablet (20 mg total) by mouth once  daily. 30 tablet 11    SPIRIVA WITH HANDIHALER 18 mcg inhalation capsule INHALE THE CONTENTS OF 1   CAPSULE (VIA 2 INHALATIONS)DAILY 90 capsule 1    tadalafiL (CIALIS) 20 MG Tab Take 1 tablet (20 mg total) by mouth daily as needed (intercourse). 24 tablet 3    valsartan-hydrochlorothiazide (DIOVAN-HCT) 160-12.5 mg per tablet TAKE 1 TABLET DAILY 90 tablet 1     No facility-administered encounter medications on file as of 12/5/2022.     Orders Placed This Encounter   Procedures    CT Chest Without Contrast     Standing Status:   Future     Standing Expiration Date:   12/5/2023     Order Specific Question:   May the Radiologist modify the order per protocol to meet the clinical needs of the patient?     Answer:   Yes     Order Specific Question:   Access port per protocol?     Answer:   No       Plan:       Problem List Items Addressed This Visit          Pulmonary    Lung mass     Patient has a right lower lobe lung mass which is smaller in size today compared to last CT repeat in 4 months will continue follow for 2 years          Other Visit Diagnoses       Localized swelling, mass and lump, trunk    -  Primary    Relevant Orders    CT Chest Without Contrast

## 2022-12-05 NOTE — ASSESSMENT & PLAN NOTE
Patient has a right lower lobe lung mass which is smaller in size today compared to last CT repeat in 4 months will continue follow for 2 years

## 2023-01-30 ENCOUNTER — OFFICE VISIT (OUTPATIENT)
Dept: FAMILY MEDICINE | Facility: CLINIC | Age: 68
End: 2023-01-30
Payer: COMMERCIAL

## 2023-01-30 VITALS
HEART RATE: 110 BPM | SYSTOLIC BLOOD PRESSURE: 128 MMHG | OXYGEN SATURATION: 98 % | HEIGHT: 71 IN | TEMPERATURE: 97 F | DIASTOLIC BLOOD PRESSURE: 72 MMHG | RESPIRATION RATE: 18 BRPM | WEIGHT: 220 LBS | BODY MASS INDEX: 30.8 KG/M2

## 2023-01-30 DIAGNOSIS — K21.9 GASTROESOPHAGEAL REFLUX DISEASE, UNSPECIFIED WHETHER ESOPHAGITIS PRESENT: ICD-10-CM

## 2023-01-30 DIAGNOSIS — Z23 NEED FOR PROPHYLACTIC VACCINATION AGAINST STREPTOCOCCUS PNEUMONIAE (PNEUMOCOCCUS): ICD-10-CM

## 2023-01-30 DIAGNOSIS — J30.9 ALLERGIC RHINITIS, UNSPECIFIED SEASONALITY, UNSPECIFIED TRIGGER: ICD-10-CM

## 2023-01-30 DIAGNOSIS — K13.0 LESION OF LIP: ICD-10-CM

## 2023-01-30 DIAGNOSIS — R19.6 HALITOSIS: ICD-10-CM

## 2023-01-30 DIAGNOSIS — R05.9 COUGH: ICD-10-CM

## 2023-01-30 DIAGNOSIS — Z12.5 SCREENING FOR PROSTATE CANCER: ICD-10-CM

## 2023-01-30 DIAGNOSIS — I10 HYPERTENSION, UNSPECIFIED TYPE: Primary | ICD-10-CM

## 2023-01-30 PROCEDURE — 90471 IMMUNIZATION ADMIN: CPT | Mod: ,,, | Performed by: FAMILY MEDICINE

## 2023-01-30 PROCEDURE — 90677 PCV20 VACCINE IM: CPT | Mod: ,,, | Performed by: FAMILY MEDICINE

## 2023-01-30 PROCEDURE — 90471 PNEUMOCOCCAL CONJUGATE VACCINE 20-VALENT: ICD-10-PCS | Mod: ,,, | Performed by: FAMILY MEDICINE

## 2023-01-30 PROCEDURE — 90677 PNEUMOCOCCAL CONJUGATE VACCINE 20-VALENT: ICD-10-PCS | Mod: ,,, | Performed by: FAMILY MEDICINE

## 2023-01-30 PROCEDURE — 99214 PR OFFICE/OUTPT VISIT, EST, LEVL IV, 30-39 MIN: ICD-10-PCS | Mod: 25,,, | Performed by: FAMILY MEDICINE

## 2023-01-30 PROCEDURE — 99214 OFFICE O/P EST MOD 30 MIN: CPT | Mod: 25,,, | Performed by: FAMILY MEDICINE

## 2023-01-30 RX ORDER — ESOMEPRAZOLE MAGNESIUM 40 MG/1
40 CAPSULE, DELAYED RELEASE ORAL 2 TIMES DAILY
Qty: 180 CAPSULE | Refills: 1 | Status: SHIPPED | OUTPATIENT
Start: 2023-01-30 | End: 2023-04-17 | Stop reason: SDUPTHER

## 2023-01-30 RX ORDER — FLUTICASONE PROPIONATE 50 MCG
2 SPRAY, SUSPENSION (ML) NASAL DAILY
Qty: 48 G | Refills: 1 | Status: SHIPPED | OUTPATIENT
Start: 2023-01-30 | End: 2023-12-19 | Stop reason: SDUPTHER

## 2023-01-30 RX ORDER — FLUTICASONE PROPIONATE AND SALMETEROL 250; 50 UG/1; UG/1
POWDER RESPIRATORY (INHALATION)
Qty: 90 EACH | Refills: 1 | Status: SHIPPED | OUTPATIENT
Start: 2023-01-30 | End: 2023-05-21 | Stop reason: SDUPTHER

## 2023-01-30 RX ORDER — VALSARTAN AND HYDROCHLOROTHIAZIDE 160; 12.5 MG/1; MG/1
1 TABLET, FILM COATED ORAL DAILY
Qty: 30 TABLET | Refills: 0 | Status: SHIPPED | OUTPATIENT
Start: 2023-01-30 | End: 2023-02-14 | Stop reason: SDUPTHER

## 2023-01-30 NOTE — PROGRESS NOTES
Mikhail Stanford is a 67 y.o. male seen today for follow-up on his hypertension and COPD.  Patient has not had lipids done a quite some time and will come to clinic tomorrow for follow-up lipid panel CBC CMP.  Patient denies any chest pain or shortness of breath above baseline.  He is complaining about a possible polypoid mass in his left lower lip.  Patient also would like to see her nose and throat regarding his chronic halitosis which is not responded to conservative therapy.      Past Medical History:   Diagnosis Date    COPD (chronic obstructive pulmonary disease)     Hypertension      Family History   Problem Relation Age of Onset    Cancer Mother     Diabetes Sister     Hyperlipidemia Sister      Current Outpatient Medications on File Prior to Visit   Medication Sig Dispense Refill    albuterol (PROVENTIL/VENTOLIN HFA) 90 mcg/actuation inhaler Inhale 1 puff into the lungs every 6 (six) hours as needed for Wheezing. 18 g 3    atorvastatin (LIPITOR) 40 MG tablet TAKE 1 TABLET NIGHTLY 80 tablet 1    ezetimibe (ZETIA) 10 mg tablet TAKE 1 TABLET ONCE DAILY 30 tablet 0    pantoprazole (PROTONIX) 20 MG tablet Take 1 tablet (20 mg total) by mouth once daily. 30 tablet 11    SPIRIVA WITH HANDIHALER 18 mcg inhalation capsule INHALE THE CONTENTS OF 1   CAPSULE (VIA 2 INHALATIONS)DAILY 90 capsule 1    tadalafiL (CIALIS) 20 MG Tab Take 1 tablet (20 mg total) by mouth daily as needed (intercourse). 24 tablet 3    [DISCONTINUED] ADVAIR DISKUS 250-50 mcg/dose diskus inhaler USE 1 INHALATION ORALLY    ONCE DAILY AT 6AM 90 each 1    [DISCONTINUED] esomeprazole (NEXIUM) 40 MG capsule Take 1 capsule (40 mg total) by mouth 2 (two) times daily. 180 capsule 1    [DISCONTINUED] fluticasone propionate (FLONASE) 50 mcg/actuation nasal spray 2 sprays (100 mcg total) by Each Nostril route once daily. 48 g 1    [DISCONTINUED] valsartan-hydrochlorothiazide (DIOVAN-HCT) 160-12.5 mg per tablet TAKE 1 TABLET DAILY 30 tablet 0    [DISCONTINUED]  amLODIPine (NORVASC) 5 MG tablet Take 1 tablet (5 mg total) by mouth once daily. (Patient not taking: Reported on 1/30/2023) 30 tablet 2    [DISCONTINUED] hydroCHLOROthiazide (HYDRODIURIL) 12.5 MG Tab Take 1 tablet (12.5 mg total) by mouth once daily. (Patient not taking: Reported on 1/30/2023) 30 tablet 2     No current facility-administered medications on file prior to visit.     Immunization History   Administered Date(s) Administered    Pneumococcal Conjugate - 20 Valent 01/30/2023       Review of Systems   Constitutional:  Negative for fever, malaise/fatigue and weight loss.   HENT:  Negative for hearing loss.    Eyes:  Negative for discharge.   Respiratory:  Negative for shortness of breath and wheezing.    Cardiovascular:  Negative for chest pain and palpitations.   Gastrointestinal:  Negative for blood in stool, constipation, diarrhea, nausea and vomiting.   Genitourinary:  Negative for hematuria and urgency.   Musculoskeletal:  Negative for neck pain.   Neurological:  Negative for weakness and headaches.   Endo/Heme/Allergies:  Negative for polydipsia.   Psychiatric/Behavioral:  Negative for depression.       Vitals:    01/30/23 1330   BP: 128/72   Pulse: 110   Resp: 18   Temp: 97.2 °F (36.2 °C)       Physical Exam  Vitals reviewed.   Constitutional:       Appearance: Normal appearance.   HENT:      Head: Normocephalic.      Mouth/Throat:      Lips: Pink. Lesions present.      Mouth: Mucous membranes are moist.      Comments: Patient has a 1 cm oval elevated area on the internal left lower lip.  Eyes:      Extraocular Movements: Extraocular movements intact.      Conjunctiva/sclera: Conjunctivae normal.      Pupils: Pupils are equal, round, and reactive to light.   Neck:      Thyroid: No thyroid mass or thyromegaly.   Cardiovascular:      Rate and Rhythm: Normal rate and regular rhythm.      Heart sounds: Normal heart sounds. No murmur heard.    No gallop.   Pulmonary:      Effort: Pulmonary effort is  normal. No respiratory distress.      Breath sounds: Normal breath sounds. No wheezing or rales.   Skin:     General: Skin is warm and dry.      Coloration: Skin is not jaundiced or pale.   Neurological:      Mental Status: He is alert.   Psychiatric:         Mood and Affect: Mood normal.         Behavior: Behavior normal.         Thought Content: Thought content normal.         Judgment: Judgment normal.        Assessment and Plan  Hypertension, unspecified type  -     valsartan-hydrochlorothiazide (DIOVAN-HCT) 160-12.5 mg per tablet; Take 1 tablet by mouth once daily.  Dispense: 30 tablet; Refill: 0  -     CBC Auto Differential; Future; Expected date: 01/31/2023  -     Comprehensive Metabolic Panel; Future; Expected date: 01/31/2023  -     Lipid Panel; Future; Expected date: 01/31/2023    Cough  -     fluticasone-salmeterol diskus inhaler 250-50 mcg; USE 1 INHALATION ORALLY    ONCE DAILY AT 6AM  Dispense: 90 each; Refill: 1    Need for prophylactic vaccination against Streptococcus pneumoniae (pneumococcus)  -     (In Office Administered) Pneumococcal Conjugate Vaccine (20 Valent) (IM)    Lesion of lip  -     Ambulatory referral/consult to ENT; Future; Expected date: 02/06/2023    Halitosis  -     Ambulatory referral/consult to ENT; Future; Expected date: 02/06/2023    Allergic rhinitis, unspecified seasonality, unspecified trigger  -     fluticasone propionate (FLONASE) 50 mcg/actuation nasal spray; 2 sprays (100 mcg total) by Each Nostril route once daily.  Dispense: 48 g; Refill: 1    Gastroesophageal reflux disease, unspecified whether esophagitis present  -     esomeprazole (NEXIUM) 40 MG capsule; Take 1 capsule (40 mg total) by mouth 2 (two) times daily.  Dispense: 180 capsule; Refill: 1            Return to clinic as needed once his lab work is in.    Health Maintenance Topics with due status: Not Due       Topic Last Completion Date    Lipid Panel 10/04/2021       Answers submitted by the patient for this  visit:  Review of Systems Questionnaire (Submitted on 1/29/2023)  activity change: No  unexpected weight change: No  rhinorrhea: No  trouble swallowing: No  visual disturbance: No  chest tightness: No  polyuria: No  difficulty urinating: No  joint swelling: No  arthralgias: No  confusion: No  dysphoric mood: No

## 2023-02-01 ENCOUNTER — TELEPHONE (OUTPATIENT)
Dept: FAMILY MEDICINE | Facility: CLINIC | Age: 68
End: 2023-02-01
Payer: COMMERCIAL

## 2023-02-01 NOTE — TELEPHONE ENCOUNTER
----- Message from Vishnu Ballesteros MD sent at 2/1/2023  8:08 AM CST -----  Lipids look good but office visit for declining renal function.  
Pt notified and verbalized understanding. Transferred to scheduling.   
No

## 2023-02-03 ENCOUNTER — OFFICE VISIT (OUTPATIENT)
Dept: OTOLARYNGOLOGY | Facility: CLINIC | Age: 68
End: 2023-02-03
Payer: COMMERCIAL

## 2023-02-03 VITALS — HEIGHT: 71 IN | BODY MASS INDEX: 30.8 KG/M2 | WEIGHT: 220 LBS

## 2023-02-03 DIAGNOSIS — K21.9 GASTROESOPHAGEAL REFLUX DISEASE, UNSPECIFIED WHETHER ESOPHAGITIS PRESENT: Primary | ICD-10-CM

## 2023-02-03 DIAGNOSIS — K13.0 LESION OF LIP: ICD-10-CM

## 2023-02-03 DIAGNOSIS — R19.6 HALITOSIS: ICD-10-CM

## 2023-02-03 PROCEDURE — 99215 OFFICE O/P EST HI 40 MIN: CPT | Mod: PBBFAC | Performed by: OTOLARYNGOLOGY

## 2023-02-03 PROCEDURE — 99204 OFFICE O/P NEW MOD 45 MIN: CPT | Mod: S$PBB,,, | Performed by: OTOLARYNGOLOGY

## 2023-02-03 PROCEDURE — 99204 PR OFFICE/OUTPT VISIT, NEW, LEVL IV, 45-59 MIN: ICD-10-PCS | Mod: S$PBB,,, | Performed by: OTOLARYNGOLOGY

## 2023-02-03 NOTE — PROGRESS NOTES
Subjective:       Patient ID: Mikhail Stanford is a 67 y.o. male.    Chief Complaint: Other (Patient presents for a left sided lip lesion with a throat odor. )  Has reflux not controlled   Dentist has worked on teeth  HPI  Review of Systems   HENT:  Positive for mouth sores.    All other systems reviewed and are negative.    Objective:      Physical Exam  General: NAD  Head: Normocephalic, atraumatic, no facial asymmetry/normal strength,  Ears: Both auricules normal in appearance, w/o deformities tympanic membranes normal external auditory canals normal  Nose: External nose w/o deformities normal turbinates no drainage or inflammation  Oral Cavity: Lips 2 cm lesion left lower , gums, floor of mouth, tongue hard palate, and buccal mucosa without mass/lesion  Oropharynx: Mucosa pink and moist, soft palate, posterior pharynx and oropharyngeal wall without mass/lesion  Neck: Supple, symmetric, trachea midline, no palpable mass/lesion, no palpable cervical lymphadenopathy  Skin: Warm and dry, no concerning lesions  Respiratory: Respirations even, unlabored   Assessment:       1. Gastroesophageal reflux disease, unspecified whether esophagitis present    2. Lesion of lip    3. Halitosis          Plan:       Refer to GI for GERD   Excise lip lesion in OR

## 2023-02-06 DIAGNOSIS — K21.9 GASTROESOPHAGEAL REFLUX DISEASE: Primary | ICD-10-CM

## 2023-02-07 ENCOUNTER — ANESTHESIA EVENT (OUTPATIENT)
Dept: GASTROENTEROLOGY | Facility: HOSPITAL | Age: 68
End: 2023-02-07
Payer: COMMERCIAL

## 2023-02-07 ENCOUNTER — HOSPITAL ENCOUNTER (OUTPATIENT)
Dept: GASTROENTEROLOGY | Facility: HOSPITAL | Age: 68
Discharge: HOME | End: 2023-02-07
Attending: INTERNAL MEDICINE
Payer: COMMERCIAL

## 2023-02-07 ENCOUNTER — ANESTHESIA (OUTPATIENT)
Dept: GASTROENTEROLOGY | Facility: HOSPITAL | Age: 68
End: 2023-02-07
Payer: COMMERCIAL

## 2023-02-07 VITALS
DIASTOLIC BLOOD PRESSURE: 87 MMHG | RESPIRATION RATE: 12 BRPM | SYSTOLIC BLOOD PRESSURE: 131 MMHG | TEMPERATURE: 98 F | HEART RATE: 78 BPM | OXYGEN SATURATION: 97 %

## 2023-02-07 DIAGNOSIS — K21.9 GASTROESOPHAGEAL REFLUX DISEASE: ICD-10-CM

## 2023-02-07 DIAGNOSIS — K21.9 GASTROESOPHAGEAL REFLUX DISEASE WITHOUT ESOPHAGITIS: Primary | ICD-10-CM

## 2023-02-07 DIAGNOSIS — K21.9 GASTROESOPHAGEAL REFLUX DISEASE, UNSPECIFIED WHETHER ESOPHAGITIS PRESENT: ICD-10-CM

## 2023-02-07 PROCEDURE — 43235 EGD DIAGNOSTIC BRUSH WASH: CPT | Performed by: INTERNAL MEDICINE

## 2023-02-07 PROCEDURE — D9220A PRA ANESTHESIA: Mod: ,,, | Performed by: NURSE ANESTHETIST, CERTIFIED REGISTERED

## 2023-02-07 PROCEDURE — 43235 EGD DIAGNOSTIC BRUSH WASH: CPT | Mod: ,,, | Performed by: INTERNAL MEDICINE

## 2023-02-07 PROCEDURE — 27000284 HC CANNULA NASAL: Performed by: NURSE ANESTHETIST, CERTIFIED REGISTERED

## 2023-02-07 PROCEDURE — 43235 PR EGD, FLEX, DIAGNOSTIC: ICD-10-PCS | Mod: ,,, | Performed by: INTERNAL MEDICINE

## 2023-02-07 PROCEDURE — 63600175 PHARM REV CODE 636 W HCPCS: Performed by: NURSE ANESTHETIST, CERTIFIED REGISTERED

## 2023-02-07 PROCEDURE — 25000003 PHARM REV CODE 250: Performed by: NURSE ANESTHETIST, CERTIFIED REGISTERED

## 2023-02-07 PROCEDURE — 37000008 HC ANESTHESIA 1ST 15 MINUTES

## 2023-02-07 PROCEDURE — 37000009 HC ANESTHESIA EA ADD 15 MINS

## 2023-02-07 PROCEDURE — D9220A PRA ANESTHESIA: ICD-10-PCS | Mod: ,,, | Performed by: NURSE ANESTHETIST, CERTIFIED REGISTERED

## 2023-02-07 RX ORDER — LIDOCAINE HYDROCHLORIDE 20 MG/ML
INJECTION, SOLUTION EPIDURAL; INFILTRATION; INTRACAUDAL; PERINEURAL
Status: DISCONTINUED | OUTPATIENT
Start: 2023-02-07 | End: 2023-02-07

## 2023-02-07 RX ORDER — PROPOFOL 10 MG/ML
VIAL (ML) INTRAVENOUS
Status: DISCONTINUED | OUTPATIENT
Start: 2023-02-07 | End: 2023-02-07

## 2023-02-07 RX ADMIN — PROPOFOL 120 MG: 10 INJECTION, EMULSION INTRAVENOUS at 09:02

## 2023-02-07 RX ADMIN — PROPOFOL 60 MG: 10 INJECTION, EMULSION INTRAVENOUS at 09:02

## 2023-02-07 RX ADMIN — LIDOCAINE HYDROCHLORIDE 50 MG: 20 INJECTION, SOLUTION INTRAVENOUS at 09:02

## 2023-02-07 RX ADMIN — SODIUM CHLORIDE: 9 INJECTION, SOLUTION INTRAVENOUS at 09:02

## 2023-02-07 NOTE — ANESTHESIA PREPROCEDURE EVALUATION
"                                                                                                             02/07/2023  Mikhail Stanford is a 67 y.o., male.    Past Medical History:   Diagnosis Date    COPD (chronic obstructive pulmonary disease)     Hypertension        Past Surgical History:   Procedure Laterality Date    DENTAL SURGERY         Family History   Problem Relation Age of Onset    Cancer Mother     Diabetes Sister     Hyperlipidemia Sister        Social History     Socioeconomic History    Marital status:    Tobacco Use    Smoking status: Former     Packs/day: 2.00     Years: 25.00     Pack years: 50.00     Types: Cigarettes    Smokeless tobacco: Former     Types: Chew     Quit date: 1/27/2006   Substance and Sexual Activity    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 18 g 3    atorvastatin (LIPITOR) 40 MG tablet TAKE 1 TABLET NIGHTLY 80 tablet 1    esomeprazole (NEXIUM) 40 MG capsule Take 1 capsule (40 mg total) by mouth 2 (two) times daily. 180 capsule 1    ezetimibe (ZETIA) 10 mg tablet TAKE 1 TABLET ONCE DAILY 30 tablet 0    fluticasone propionate (FLONASE) 50 mcg/actuation nasal spray 2 sprays (100 mcg total) by Each Nostril route once daily. 48 g 1    fluticasone-salmeterol diskus inhaler 250-50 mcg USE 1 INHALATION ORALLY    ONCE DAILY AT 6AM 90 each 1    pantoprazole (PROTONIX) 20 MG tablet Take 1 tablet (20 mg total) by mouth once daily. 30 tablet 11    SPIRIVA WITH HANDIHALER 18 mcg inhalation capsule INHALE THE CONTENTS OF 1   CAPSULE (VIA 2 INHALATIONS)DAILY 90 capsule 1    tadalafiL (CIALIS) 20 MG Tab Take 1 tablet (20 mg total) by mouth daily as " needed (intercourse). 24 tablet 3    valsartan-hydrochlorothiazide (DIOVAN-HCT) 160-12.5 mg per tablet Take 1 tablet by mouth once daily. 30 tablet 0     No current facility-administered medications for this encounter.       Review of patient's allergies indicates:  No Known Allergies    Pre-op Assessment    I have reviewed the Patient Summary Reports.     I have reviewed the Nursing Notes. I have reviewed the NPO Status.   I have reviewed the Medications.     Review of Systems  Anesthesia Hx:  No problems with previous Anesthesia    Social:  Former Smoker    Cardiovascular:   Hypertension    Pulmonary:   COPD Shortness of breath    Hepatic/GI:   GERD    Endocrine:  Obesity / BMI > 30      Physical Exam  General: Well nourished, Alert, Oriented and Cooperative    Airway:  Mallampati: II   Mouth Opening: Normal  Neck ROM: Normal ROM    Dental:  Intact    Chest/Lungs:  Normal Respiratory Rate    Heart:  Rate: Normal        Anesthesia Plan  Type of Anesthesia, risks & benefits discussed:    Anesthesia Type: Gen Natural Airway, MAC  Intra-op Monitoring Plan: Standard ASA Monitors  Post Op Pain Control Plan: multimodal analgesia and IV/PO Opioids PRN  Induction:  IV  Informed Consent: Informed consent signed with the Patient and all parties understand the risks and agree with anesthesia plan.  All questions answered. Patient consented to blood products? Yes  ASA Score: 3  Day of Surgery Review of History & Physical: I have interviewed and examined the patient. I have reviewed the patient's H&P dated: There are no significant changes.     Ready For Surgery From Anesthesia Perspective.     .

## 2023-02-07 NOTE — H&P
"Gastroenterology Pre-procedure H&P    Chief Complaint: GERD (gastroesophageal reflux disease)    History of Present Illness    Mikhail Stanford is a 67 y.o. male that  has a past medical history of COPD (chronic obstructive pulmonary disease), Hypertension, and Sleep apnea.     Patient with GERD on nexium with improvement without resolution of symptoms after being on nexium. Also with complaints of malodorous breath. No prior EGD. Reports he is up to date with his colonoscopy, none on file here.       Past Medical History:   Diagnosis Date    COPD (chronic obstructive pulmonary disease)     Hypertension     Sleep apnea        Past Surgical History:   Procedure Laterality Date    DENTAL SURGERY         Family History   Problem Relation Age of Onset    Cancer Mother     Diabetes Sister     Hyperlipidemia Sister        Social History     Socioeconomic History    Marital status:    Tobacco Use    Smoking status: Former     Packs/day: 2.00     Years: 25.00     Pack years: 50.00     Types: Cigarettes    Smokeless tobacco: Former     Types: Chew     Quit date: 1/27/2006   Substance and Sexual Activity    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 18 g 3    atorvastatin (LIPITOR) 40 MG tablet TAKE 1 TABLET NIGHTLY 80 tablet 1    esomeprazole (NEXIUM) 40 MG capsule Take 1 capsule (40 mg total) by mouth 2 (two) times daily. 180 capsule 1    ezetimibe (ZETIA) 10 mg tablet TAKE 1 TABLET ONCE DAILY 30 tablet 0    fluticasone propionate (FLONASE) 50 mcg/actuation nasal spray 2 sprays (100 mcg total) by Each Nostril route once daily. 48 g 1    fluticasone-salmeterol diskus inhaler 250-50 mcg USE 1 INHALATION " ORALLY    ONCE DAILY AT 6AM 90 each 1    pantoprazole (PROTONIX) 20 MG tablet Take 1 tablet (20 mg total) by mouth once daily. 30 tablet 11    SPIRIVA WITH HANDIHALER 18 mcg inhalation capsule INHALE THE CONTENTS OF 1   CAPSULE (VIA 2 INHALATIONS)DAILY 90 capsule 1    tadalafiL (CIALIS) 20 MG Tab Take 1 tablet (20 mg total) by mouth daily as needed (intercourse). 24 tablet 3    valsartan-hydrochlorothiazide (DIOVAN-HCT) 160-12.5 mg per tablet Take 1 tablet by mouth once daily. 30 tablet 0     No current facility-administered medications for this encounter.       Review of patient's allergies indicates:  No Known Allergies    Objective:  Vitals:    02/07/23 0909   BP: 127/80   Pulse: 71   Resp: 13   SpO2: 96%        GEN: normal appearing, NAD, AAO x3  HENT: NCAT, anicteric, OP benign  CV: normal rate, regular rhythm  RESP: CTA, symmetric rise, unlabored  ABD: soft, ND, no guarding or TTP  SKIN: warm and dry  NEURO: grossly afocal    Assessment and Plan:    Proceed with:    EGD for GERD      Fer Avila MD  Gastroenterology

## 2023-02-07 NOTE — TRANSFER OF CARE
Anesthesia Transfer of Care Note    Patient: Mikhail Stanford    Procedure(s) Performed: * EGD*    Patient location: GI    Anesthesia Type: general    Transport from OR: Transported from OR on room air with adequate spontaneous ventilation. Continuous ECG monitoring in transport. Continuous SpO2 monitoring in transport    Post pain: adequate analgesia    Post assessment: no apparent anesthetic complications    Post vital signs: stable    Level of consciousness: sedated and responds to stimulation    Nausea/Vomiting: no nausea/vomiting    Complications: none    Transfer of care protocol was followedComments: Good SV continue, NAD, VSS, RTRN      Last vitals:   Visit Vitals  /77   Pulse 78   Temp 36.7 °C (98 °F)   Resp 18   SpO2 95%

## 2023-02-07 NOTE — ANESTHESIA POSTPROCEDURE EVALUATION
Anesthesia Post Evaluation    Patient: Mikhail Stanford    Procedure(s) Performed: * EGD *    Final Anesthesia Type: general      Patient location during evaluation: GI PACU  Patient participation: Yes- Able to Participate  Level of consciousness: awake and alert  Post-procedure vital signs: reviewed and stable  Pain management: adequate  Airway patency: patent    PONV status at discharge: No PONV  Anesthetic complications: no      Cardiovascular status: blood pressure returned to baseline and hemodynamically stable  Respiratory status: spontaneous ventilation  Hydration status: euvolemic  Follow-up not needed.  Comments: Pt voices appreciation for care          Vitals Value Taken Time   /87 02/07/23 1012   Temp 98 02/07/23 1533   Pulse 78 02/07/23 1012   Resp 12 02/07/23 1012   SpO2 97 % 02/07/23 1012         Event Time   Out of Recovery 10:13:19         Pain/Bee Score: Bee Score: 10 (2/7/2023  9:44 AM)

## 2023-02-07 NOTE — DISCHARGE INSTRUCTIONS
Procedure Date  2/7/23     Impression  Overall Impression:   - The esophagus was normal  - The exam was difficult due to a J-shaped stomach, unable to intubate the duodenum with gastroscope  - Extent of exam was the antrum of the stomach  - No zenker's diverticulum, hiatal hernia, esophagitis, stricture, mass, gastritis, or gastric ulcer      Recommendation    Follow up with PCP  Continue PPI for acid reflux and follow a GERD diet  I did not switch to an enteroscope or peds colonoscope to attempt duodenual intubated due to symptoms being limited to reflux and halitosis, but if you were to develop new symptoms in the future, would need to use one of these for evaluation of the duodenum       Outcome of procedure: successful partial EGD (extent reached was antrum of stomach)  Disposition: patient to recovery following procedure; discharge to home when appropriate parameters met  Provisions for follow up: please call my office for any unexpected symptoms like chest or abdominal pain or bleeding following your procedure.  Final Diagnosis: GERD, halitosis     THE NURSE WILL CALL YOU WITH YOUR BIOPSY RESULTS IN A FEW DAYS.     NO DRIVING, OPERATING EQUIPMENT, OR SIGNING LEGAL DOCUMENTS FOR 24 HOURS.

## 2023-02-14 ENCOUNTER — OFFICE VISIT (OUTPATIENT)
Dept: FAMILY MEDICINE | Facility: CLINIC | Age: 68
End: 2023-02-14
Payer: COMMERCIAL

## 2023-02-14 VITALS
WEIGHT: 225 LBS | HEART RATE: 72 BPM | OXYGEN SATURATION: 97 % | RESPIRATION RATE: 18 BRPM | DIASTOLIC BLOOD PRESSURE: 80 MMHG | HEIGHT: 71 IN | SYSTOLIC BLOOD PRESSURE: 138 MMHG | BODY MASS INDEX: 31.5 KG/M2

## 2023-02-14 DIAGNOSIS — N52.9 ERECTILE DYSFUNCTION, UNSPECIFIED ERECTILE DYSFUNCTION TYPE: ICD-10-CM

## 2023-02-14 DIAGNOSIS — N28.9 RENAL INSUFFICIENCY: Primary | ICD-10-CM

## 2023-02-14 DIAGNOSIS — E78.5 HYPERLIPIDEMIA, UNSPECIFIED HYPERLIPIDEMIA TYPE: ICD-10-CM

## 2023-02-14 DIAGNOSIS — I10 HYPERTENSION, UNSPECIFIED TYPE: ICD-10-CM

## 2023-02-14 DIAGNOSIS — J44.9 CHRONIC OBSTRUCTIVE PULMONARY DISEASE, UNSPECIFIED COPD TYPE: ICD-10-CM

## 2023-02-14 PROCEDURE — 99213 PR OFFICE/OUTPT VISIT, EST, LEVL III, 20-29 MIN: ICD-10-PCS | Mod: ,,, | Performed by: FAMILY MEDICINE

## 2023-02-14 PROCEDURE — 99213 OFFICE O/P EST LOW 20 MIN: CPT | Mod: ,,, | Performed by: FAMILY MEDICINE

## 2023-02-14 RX ORDER — VALSARTAN AND HYDROCHLOROTHIAZIDE 160; 12.5 MG/1; MG/1
1 TABLET, FILM COATED ORAL DAILY
Qty: 90 TABLET | Refills: 1 | Status: SHIPPED | OUTPATIENT
Start: 2023-02-14 | End: 2023-04-17 | Stop reason: SDUPTHER

## 2023-02-14 RX ORDER — ATORVASTATIN CALCIUM 40 MG/1
40 TABLET, FILM COATED ORAL NIGHTLY
Qty: 90 TABLET | Refills: 1 | Status: SHIPPED | OUTPATIENT
Start: 2023-02-14 | End: 2023-04-17 | Stop reason: SDUPTHER

## 2023-02-14 RX ORDER — TADALAFIL 20 MG/1
20 TABLET ORAL DAILY PRN
Qty: 24 TABLET | Refills: 3 | Status: SHIPPED | OUTPATIENT
Start: 2023-02-14 | End: 2023-04-17 | Stop reason: SDUPTHER

## 2023-02-14 RX ORDER — TIOTROPIUM BROMIDE 18 UG/1
CAPSULE ORAL; RESPIRATORY (INHALATION)
Qty: 90 CAPSULE | Refills: 1 | Status: SHIPPED | OUTPATIENT
Start: 2023-02-14 | End: 2023-04-17 | Stop reason: DRUGHIGH

## 2023-02-14 NOTE — PROGRESS NOTES
Mikhail Stanford is a 68 y.o. male seen today for follow-up on his recent lab work.  His lipids are close to goal but his creatinine remains elevated with a GFR 48.  Patient reports he does take over-the-counter leave her joint pain and I have encouraged him to try topical Voltaren gel instead and then Tylenol possible.  In the past we had tried the hold or discontinue his diuretic and immediately his blood pressure did rise to worrisome levels.  We discussed a nephrology evaluation for further workup.      Past Medical History:   Diagnosis Date    COPD (chronic obstructive pulmonary disease)     Hypertension     Sleep apnea      Family History   Problem Relation Age of Onset    Cancer Mother     Diabetes Sister     Hyperlipidemia Sister      Current Outpatient Medications on File Prior to Visit   Medication Sig Dispense Refill    albuterol (PROVENTIL/VENTOLIN HFA) 90 mcg/actuation inhaler Inhale 1 puff into the lungs every 6 (six) hours as needed for Wheezing. 18 g 3    esomeprazole (NEXIUM) 40 MG capsule Take 1 capsule (40 mg total) by mouth 2 (two) times daily. 180 capsule 1    ezetimibe (ZETIA) 10 mg tablet TAKE 1 TABLET ONCE DAILY 90 tablet 1    fluticasone propionate (FLONASE) 50 mcg/actuation nasal spray 2 sprays (100 mcg total) by Each Nostril route once daily. 48 g 1    fluticasone-salmeterol diskus inhaler 250-50 mcg USE 1 INHALATION ORALLY    ONCE DAILY AT 6AM 90 each 1    pantoprazole (PROTONIX) 20 MG tablet Take 1 tablet (20 mg total) by mouth once daily. 30 tablet 11    [DISCONTINUED] atorvastatin (LIPITOR) 40 MG tablet TAKE 1 TABLET NIGHTLY 80 tablet 1    [DISCONTINUED] SPIRIVA WITH HANDIHALER 18 mcg inhalation capsule INHALE THE CONTENTS OF 1   CAPSULE (VIA 2 INHALATIONS)DAILY 90 capsule 1    [DISCONTINUED] tadalafiL (CIALIS) 20 MG Tab Take 1 tablet (20 mg total) by mouth daily as needed (intercourse). 24 tablet 3    [DISCONTINUED] valsartan-hydrochlorothiazide (DIOVAN-HCT) 160-12.5 mg per tablet Take  1 tablet by mouth once daily. 30 tablet 0     No current facility-administered medications on file prior to visit.     Immunization History   Administered Date(s) Administered    Pneumococcal Conjugate - 20 Valent 01/30/2023       Review of Systems   Constitutional:  Negative for fever, malaise/fatigue and weight loss.   Respiratory:  Negative for shortness of breath.    Cardiovascular:  Negative for chest pain and palpitations.   Gastrointestinal:  Negative for nausea and vomiting.   Psychiatric/Behavioral:  Negative for depression.       Vitals:    02/14/23 1014   BP: 138/80   Pulse: 72   Resp: 18       Physical Exam  Vitals reviewed.   Constitutional:       Appearance: Normal appearance.   HENT:      Head: Normocephalic.   Eyes:      Extraocular Movements: Extraocular movements intact.      Conjunctiva/sclera: Conjunctivae normal.      Pupils: Pupils are equal, round, and reactive to light.   Neck:      Thyroid: No thyroid mass or thyromegaly.   Cardiovascular:      Rate and Rhythm: Normal rate and regular rhythm.      Heart sounds: Normal heart sounds. No murmur heard.    No gallop.   Pulmonary:      Effort: Pulmonary effort is normal. No respiratory distress.      Breath sounds: Normal breath sounds. No wheezing or rales.   Skin:     General: Skin is warm and dry.      Coloration: Skin is not jaundiced or pale.   Neurological:      Mental Status: He is alert.   Psychiatric:         Mood and Affect: Mood normal.         Behavior: Behavior normal.         Thought Content: Thought content normal.         Judgment: Judgment normal.        Assessment and Plan  Renal insufficiency  -     Ambulatory referral/consult to Nephrology; Future; Expected date: 02/21/2023    Hypertension, unspecified type  -     valsartan-hydrochlorothiazide (DIOVAN-HCT) 160-12.5 mg per tablet; Take 1 tablet by mouth once daily.  Dispense: 90 tablet; Refill: 1    Erectile dysfunction, unspecified erectile dysfunction type  -     tadalafiL  (CIALIS) 20 MG Tab; Take 1 tablet (20 mg total) by mouth daily as needed (intercourse).  Dispense: 24 tablet; Refill: 3    Hyperlipidemia, unspecified hyperlipidemia type  -     atorvastatin (LIPITOR) 40 MG tablet; Take 1 tablet (40 mg total) by mouth every evening.  Dispense: 90 tablet; Refill: 1    Chronic obstructive pulmonary disease, unspecified COPD type  -     tiotropium (SPIRIVA WITH HANDIHALER) 18 mcg inhalation capsule; INHALE THE CONTENTS OF 1   CAPSULE (VIA 2 INHALATIONS)DAILY  Dispense: 90 capsule; Refill: 1            Return to clinic in 3 months or as needed.    Health Maintenance Topics with due status: Not Due       Topic Last Completion Date    Lipid Panel 01/31/2023

## 2023-02-17 ENCOUNTER — TELEPHONE (OUTPATIENT)
Dept: OTOLARYNGOLOGY | Facility: CLINIC | Age: 68
End: 2023-02-17
Payer: COMMERCIAL

## 2023-02-21 ENCOUNTER — ANESTHESIA (OUTPATIENT)
Dept: SURGERY | Facility: HOSPITAL | Age: 68
End: 2023-02-21
Payer: COMMERCIAL

## 2023-02-21 ENCOUNTER — ANESTHESIA EVENT (OUTPATIENT)
Dept: SURGERY | Facility: HOSPITAL | Age: 68
End: 2023-02-21
Payer: COMMERCIAL

## 2023-02-21 ENCOUNTER — HOSPITAL ENCOUNTER (OUTPATIENT)
Facility: HOSPITAL | Age: 68
Discharge: HOME | End: 2023-02-21
Attending: OTOLARYNGOLOGY | Admitting: OTOLARYNGOLOGY
Payer: COMMERCIAL

## 2023-02-21 VITALS
TEMPERATURE: 98 F | SYSTOLIC BLOOD PRESSURE: 118 MMHG | HEIGHT: 71 IN | OXYGEN SATURATION: 94 % | HEART RATE: 62 BPM | WEIGHT: 225 LBS | RESPIRATION RATE: 16 BRPM | BODY MASS INDEX: 31.5 KG/M2 | DIASTOLIC BLOOD PRESSURE: 82 MMHG

## 2023-02-21 DIAGNOSIS — K13.0 LIP LESION: Primary | ICD-10-CM

## 2023-02-21 DIAGNOSIS — K13.0 LESION OF LIP: ICD-10-CM

## 2023-02-21 PROCEDURE — 36000706: Performed by: OTOLARYNGOLOGY

## 2023-02-21 PROCEDURE — 36000707: Performed by: OTOLARYNGOLOGY

## 2023-02-21 PROCEDURE — 71000015 HC POSTOP RECOV 1ST HR: Performed by: OTOLARYNGOLOGY

## 2023-02-21 PROCEDURE — 25000003 PHARM REV CODE 250: Performed by: OTOLARYNGOLOGY

## 2023-02-21 PROCEDURE — 13151 CMPLX RPR E/N/E/L 1.1-2.5 CM: CPT | Mod: ,,, | Performed by: OTOLARYNGOLOGY

## 2023-02-21 PROCEDURE — D9220A PRA ANESTHESIA: Mod: ANES,,, | Performed by: ANESTHESIOLOGY

## 2023-02-21 PROCEDURE — 27000284 HC CANNULA NASAL: Performed by: NURSE ANESTHETIST, CERTIFIED REGISTERED

## 2023-02-21 PROCEDURE — 11443 EXC FACE-MM B9+MARG 2.1-3 CM: CPT | Mod: 51,,, | Performed by: OTOLARYNGOLOGY

## 2023-02-21 PROCEDURE — 13151 PR RECMPL WND LID,NOS,EAR 1.1-2.5 CM: ICD-10-PCS | Mod: ,,, | Performed by: OTOLARYNGOLOGY

## 2023-02-21 PROCEDURE — 27000716 HC OXISENSOR PROBE, ANY SIZE: Performed by: NURSE ANESTHETIST, CERTIFIED REGISTERED

## 2023-02-21 PROCEDURE — 37000008 HC ANESTHESIA 1ST 15 MINUTES: Performed by: OTOLARYNGOLOGY

## 2023-02-21 PROCEDURE — D9220A PRA ANESTHESIA: ICD-10-PCS | Mod: CRNA,,, | Performed by: NURSE ANESTHETIST, CERTIFIED REGISTERED

## 2023-02-21 PROCEDURE — 88305 SURGICAL PATHOLOGY: ICD-10-PCS | Mod: 26,,, | Performed by: PATHOLOGY

## 2023-02-21 PROCEDURE — D9220A PRA ANESTHESIA: ICD-10-PCS | Mod: ANES,,, | Performed by: ANESTHESIOLOGY

## 2023-02-21 PROCEDURE — 11443 PR EXC SKIN BENIG 2.1-3 CM FACE,FACIAL: ICD-10-PCS | Mod: 51,,, | Performed by: OTOLARYNGOLOGY

## 2023-02-21 PROCEDURE — 88305 TISSUE EXAM BY PATHOLOGIST: CPT | Mod: 26,,, | Performed by: PATHOLOGY

## 2023-02-21 PROCEDURE — D9220A PRA ANESTHESIA: Mod: CRNA,,, | Performed by: NURSE ANESTHETIST, CERTIFIED REGISTERED

## 2023-02-21 PROCEDURE — 37000009 HC ANESTHESIA EA ADD 15 MINS: Performed by: OTOLARYNGOLOGY

## 2023-02-21 PROCEDURE — 63600175 PHARM REV CODE 636 W HCPCS: Performed by: NURSE ANESTHETIST, CERTIFIED REGISTERED

## 2023-02-21 PROCEDURE — 71000033 HC RECOVERY, INTIAL HOUR: Performed by: OTOLARYNGOLOGY

## 2023-02-21 PROCEDURE — 25000003 PHARM REV CODE 250: Performed by: NURSE ANESTHETIST, CERTIFIED REGISTERED

## 2023-02-21 PROCEDURE — 88305 TISSUE EXAM BY PATHOLOGIST: CPT | Mod: TC,SUR | Performed by: OTOLARYNGOLOGY

## 2023-02-21 RX ORDER — LIDOCAINE HYDROCHLORIDE 20 MG/ML
INJECTION, SOLUTION EPIDURAL; INFILTRATION; INTRACAUDAL; PERINEURAL
Status: DISCONTINUED | OUTPATIENT
Start: 2023-02-21 | End: 2023-02-21

## 2023-02-21 RX ORDER — MEPERIDINE HYDROCHLORIDE 25 MG/ML
25 INJECTION INTRAMUSCULAR; INTRAVENOUS; SUBCUTANEOUS ONCE AS NEEDED
Status: DISCONTINUED | OUTPATIENT
Start: 2023-02-21 | End: 2023-02-21 | Stop reason: HOSPADM

## 2023-02-21 RX ORDER — ONDANSETRON 2 MG/ML
4 INJECTION INTRAMUSCULAR; INTRAVENOUS DAILY PRN
Status: DISCONTINUED | OUTPATIENT
Start: 2023-02-21 | End: 2023-02-21 | Stop reason: HOSPADM

## 2023-02-21 RX ORDER — HYDROMORPHONE HYDROCHLORIDE 2 MG/ML
0.5 INJECTION, SOLUTION INTRAMUSCULAR; INTRAVENOUS; SUBCUTANEOUS EVERY 5 MIN PRN
Status: DISCONTINUED | OUTPATIENT
Start: 2023-02-21 | End: 2023-02-21 | Stop reason: HOSPADM

## 2023-02-21 RX ORDER — LIDOCAINE HYDROCHLORIDE AND EPINEPHRINE 10; 10 MG/ML; UG/ML
INJECTION, SOLUTION INFILTRATION; PERINEURAL
Status: DISCONTINUED | OUTPATIENT
Start: 2023-02-21 | End: 2023-02-21 | Stop reason: HOSPADM

## 2023-02-21 RX ORDER — ONDANSETRON 2 MG/ML
INJECTION INTRAMUSCULAR; INTRAVENOUS
Status: DISCONTINUED | OUTPATIENT
Start: 2023-02-21 | End: 2023-02-21

## 2023-02-21 RX ORDER — PROPOFOL 10 MG/ML
VIAL (ML) INTRAVENOUS
Status: DISCONTINUED | OUTPATIENT
Start: 2023-02-21 | End: 2023-02-21

## 2023-02-21 RX ORDER — FENTANYL CITRATE 50 UG/ML
INJECTION, SOLUTION INTRAMUSCULAR; INTRAVENOUS
Status: DISCONTINUED | OUTPATIENT
Start: 2023-02-21 | End: 2023-02-21

## 2023-02-21 RX ORDER — DIPHENHYDRAMINE HYDROCHLORIDE 50 MG/ML
25 INJECTION INTRAMUSCULAR; INTRAVENOUS EVERY 6 HOURS PRN
Status: DISCONTINUED | OUTPATIENT
Start: 2023-02-21 | End: 2023-02-21 | Stop reason: HOSPADM

## 2023-02-21 RX ORDER — MIDAZOLAM HYDROCHLORIDE 1 MG/ML
INJECTION INTRAMUSCULAR; INTRAVENOUS
Status: DISCONTINUED | OUTPATIENT
Start: 2023-02-21 | End: 2023-02-21

## 2023-02-21 RX ORDER — MORPHINE SULFATE 10 MG/ML
4 INJECTION INTRAMUSCULAR; INTRAVENOUS; SUBCUTANEOUS EVERY 5 MIN PRN
Status: DISCONTINUED | OUTPATIENT
Start: 2023-02-21 | End: 2023-02-21 | Stop reason: HOSPADM

## 2023-02-21 RX ADMIN — PROPOFOL 40 MG: 10 INJECTION, EMULSION INTRAVENOUS at 10:02

## 2023-02-21 RX ADMIN — ONDANSETRON 4 MG: 2 INJECTION INTRAMUSCULAR; INTRAVENOUS at 10:02

## 2023-02-21 RX ADMIN — PROPOFOL 50 MG: 10 INJECTION, EMULSION INTRAVENOUS at 10:02

## 2023-02-21 RX ADMIN — LIDOCAINE HYDROCHLORIDE 100 MG: 20 INJECTION, SOLUTION EPIDURAL; INFILTRATION; INTRACAUDAL; PERINEURAL at 10:02

## 2023-02-21 RX ADMIN — FENTANYL CITRATE 100 MCG: 50 INJECTION INTRAMUSCULAR; INTRAVENOUS at 10:02

## 2023-02-21 RX ADMIN — MIDAZOLAM 2 MG: 1 INJECTION INTRAMUSCULAR; INTRAVENOUS at 10:02

## 2023-02-21 NOTE — BRIEF OP NOTE
[facility] - Orthopedic Periop Services  Brief Operative Note    Surgery Date: 2/21/2023     Surgeon(s) and Role:     * Kuldeep Yao MD - Primary    Assisting Surgeon: None    Pre-op Diagnosis:  Lesion of lip [K13.0]    Post-op Diagnosis:  Post-Op Diagnosis Codes:     * Lesion of lip [K13.0]    Procedure(s) (LRB):  EXCISION, LESION, LIP (N/A)    Anesthesia: General    Operative Findings: lip lesion 2.5 cm     Estimated Blood Loss: 0         Specimens:   Specimen (24h ago, onward)       Start     Ordered    02/21/23 1012  Surgical Pathology  RELEASE UPON ORDERING         02/21/23 1012                      Discharge Note    OUTCOME: Patient tolerated treatment/procedure well without complication and is now ready for discharge.    DISPOSITION: Home or Self Care    FINAL DIAGNOSIS:  lip lesion    FOLLOWUP: In clinic    DISCHARGE INSTRUCTIONS:  No discharge procedures on file.

## 2023-02-21 NOTE — ANESTHESIA POSTPROCEDURE EVALUATION
Anesthesia Post Evaluation    Patient: Mikhail Stanford    Procedure(s) Performed: Procedure(s) (LRB):  EXCISION, LESION, LIP (N/A)    Final Anesthesia Type: MAC      Patient location during evaluation: PACU  Patient participation: Yes- Able to Participate  Level of consciousness: awake and alert and oriented  Post-procedure vital signs: reviewed and stable  Pain management: adequate  Airway patency: patent  KATIE mitigation strategies: Multimodal analgesia  PONV status at discharge: No PONV  Anesthetic complications: no      Cardiovascular status: hemodynamically stable  Respiratory status: unassisted, spontaneous ventilation and nasal cannula  Hydration status: euvolemic  Follow-up needed           Vitals Value Taken Time   /69 02/21/23 1044   Temp 36.4 °C (97.5 °F) 02/21/23 1026   Pulse 81 02/21/23 1044   Resp 15 02/21/23 1040   SpO2 91 % 02/21/23 1044   Vitals shown include unvalidated device data.      No case tracking events are documented in the log.      Pain/Bee Score: Bee Score: 9 (2/21/2023 10:40 AM)

## 2023-02-21 NOTE — ANESTHESIA PREPROCEDURE EVALUATION
02/21/2023  Mikhail Stanford is a 68 y.o., male.      Pre-op Assessment    I have reviewed the Patient Summary Reports.     I have reviewed the Nursing Notes. I have reviewed the NPO Status.   I have reviewed the Medications.     Review of Systems  Anesthesia Hx:  No problems with previous Anesthesia  Denies Family Hx of Anesthesia complications.   Denies Personal Hx of Anesthesia complications.   Social:  Former Smoker, No Alcohol Use    Cardiovascular:   Exercise tolerance: good Hypertension hyperlipidemia ECG has been reviewed.    Pulmonary:   COPD, mild Shortness of breath Sleep Apnea H/O ILD, lung mass    PFTs show moderate restrictive lung disease   Renal/:   Chronic Renal Disease, CKD    Hepatic/GI:   GERD    Endocrine:  Obesity / BMI > 30      Physical Exam  General: Well nourished, Cooperative and Alert    Airway:  Mallampati: II   Mouth Opening: Normal  TM Distance: Normal  Tongue: Normal  Neck ROM: Normal ROM    Dental:  Intact    Chest/Lungs:  Clear to auscultation, Normal Respiratory Rate    Heart:  Rate: Normal  Rhythm: Regular Rhythm        Chemistry        Component Value Date/Time     01/31/2023 0815    K 4.0 01/31/2023 0815     01/31/2023 0815    CO2 27 01/31/2023 0815    BUN 30 (H) 01/31/2023 0815    CREATININE 1.59 (H) 01/31/2023 0815     01/31/2023 0815        Component Value Date/Time    CALCIUM 8.8 01/31/2023 0815    ALKPHOS 58 01/31/2023 0815    AST 23 01/31/2023 0815    ALT 40 01/31/2023 0815    BILITOT 0.7 01/31/2023 0815    ESTGFRAFRICA 59 (L) 10/04/2021 0913    EGFRNONAA 43 (L) 06/13/2022 1546        Lab Results   Component Value Date    WBC 5.19 01/31/2023    HGB 14.3 01/31/2023    HCT 45.5 01/31/2023     01/31/2023     Results for orders placed or performed in visit on 02/14/22   EKG 12-lead    Collection Time: 02/14/22  9:49 AM    Narrative    Test  Reason : R06.02,    Vent. Rate : 095 BPM     Atrial Rate : 095 BPM     P-R Int : 146 ms          QRS Dur : 068 ms      QT Int : 330 ms       P-R-T Axes : 053 023 064 degrees     QTc Int : 414 ms    Normal sinus rhythm  Normal ECG  No previous ECGs available  Confirmed by Troy CAMERON, Jeremias MALONE ([id number]) on 2/22/2022 8:43:02 AM    Referred By: CITLALI GARCIA           Confirmed By:Jeremias Simmons MD     TTE 2/4/22  Summary     The left ventricle is normal in size with mild concentric hypertrophy and   The estimated ejection fraction is 60%.   Normal right ventricular size.   Mild mitral regurgitation.   Mild tricuspid regurgitation.   Mild left atrial enlargement.      PFTs 11-23-21  1. Acceptable quality studies  2. No obstruction by ratio. No significant bronchodilator response. Low FEV1 and FVC, consistent with restriction.  3. Restriction by TLC, moderate.   4. Mild reduction in DLCO, corrected for Hgb.     Impression:  Moderate restriction with mild impairment in gas exchange.     Anesthesia Plan  Type of Anesthesia, risks & benefits discussed:    Anesthesia Type: Gen Supraglottic Airway  Intra-op Monitoring Plan: Standard ASA Monitors  Post Op Pain Control Plan: multimodal analgesia  Induction:  IV  Airway Plan: Direct, Post-Induction  Informed Consent: Informed consent signed with the Patient and all parties understand the risks and agree with anesthesia plan.  All questions answered.   ASA Score: 3  Day of Surgery Review of History & Physical: H&P Update referred to the surgeon/provider.I have interviewed and examined the patient. I have reviewed the patient's H&P dated: There are no significant changes.     Ready For Surgery From Anesthesia Perspective.     .

## 2023-02-21 NOTE — TRANSFER OF CARE
"Anesthesia Transfer of Care Note    Patient: Mikhail Stanford    Procedure(s) Performed: Procedure(s) (LRB):  EXCISION, LESION, LIP (N/A)    Patient location: PACU    Anesthesia Type: general    Transport from OR: Transported from OR on room air with adequate spontaneous ventilation    Post pain: adequate analgesia    Post assessment: no apparent anesthetic complications    Post vital signs: stable    Level of consciousness: lethargic    Nausea/Vomiting: no nausea/vomiting    Complications: none    Transfer of care protocol was followed      Last vitals:   Visit Vitals  /61   Pulse 81   Temp 36.4 °C (97.5 °F)   Resp 16   Ht 5' 11" (1.803 m)   Wt 102.1 kg (225 lb)   SpO2 (!) 94%   BMI 31.38 kg/m²     "

## 2023-02-21 NOTE — OR NURSING
1024 Rec'd pt to PACU asleep with nasal trumpet in place. VSS. No signs of distress noted, respirations even and unlabored. Scant amount of blood noted from lower lip incision. No needs at this time. Will continue to monitor.     1030 Nasal trumpet removed, respirations even and unlabored. Reoriented to surroundings. Denies pain/needs at this time.     1056 Out of PACU. VSS. No signs of bleeding/distress noted.     1100 Pt to ASC 16 awake and alert with no distress noted, respirations even and unlabored. Family at bedside. Bedside report given to JARETH Thornton RN. Scant amount of blood noted from lower lip excision. Denies pain/needs. /71, P 75, R 16, O2 96% room air.

## 2023-02-21 NOTE — OP NOTE
Surgery Date: 2/21/2023     Surgeon(s) and Role:     * Kuldeep Yao MD - Primary    Assisting Surgeon: None    Pre-op Diagnosis:  Lesion of lip [K13.0]    Post-op Diagnosis:  Post-Op Diagnosis Codes:     * Lesion of lip [K13.0]    Procedure(s) (LRB):  EXCISION, LESION, LIP (N/A)  AfterI V  anesthesia the lesion was prepped and draped in a sterile fashion. It was infiltrated with local 2% xylocaine with epi 1:100,000 around 1 cc's  The elongated 2.5 cm  lesion was excised in an elliptical fashion to the subcutaneous tissue and was undermined to facilitate closure . Hemostasis was meticulously obtained it was closed subcuticular with 4 O chromic buried . The patient  tolerated procedure well was taken to RR in stable condition   Anesthesia: General    Operative Findings: lip lesion 2.5 cm     Estimated Blood Loss: 0

## 2023-02-22 LAB
ESTROGEN SERPL-MCNC: NORMAL PG/ML
INSULIN SERPL-ACNC: NORMAL U[IU]/ML
LAB AP GROSS DESCRIPTION: NORMAL
LAB AP LABORATORY NOTES: NORMAL
T3RU NFR SERPL: NORMAL %

## 2023-02-24 ENCOUNTER — OFFICE VISIT (OUTPATIENT)
Dept: FAMILY MEDICINE | Facility: CLINIC | Age: 68
End: 2023-02-24
Payer: COMMERCIAL

## 2023-02-24 VITALS
OXYGEN SATURATION: 95 % | WEIGHT: 225 LBS | RESPIRATION RATE: 19 BRPM | TEMPERATURE: 99 F | DIASTOLIC BLOOD PRESSURE: 80 MMHG | SYSTOLIC BLOOD PRESSURE: 132 MMHG | HEART RATE: 93 BPM | BODY MASS INDEX: 31.5 KG/M2 | HEIGHT: 71 IN

## 2023-02-24 DIAGNOSIS — J06.9 UPPER RESPIRATORY TRACT INFECTION, UNSPECIFIED TYPE: Primary | ICD-10-CM

## 2023-02-24 DIAGNOSIS — H69.92 DYSFUNCTION OF LEFT EUSTACHIAN TUBE: ICD-10-CM

## 2023-02-24 PROCEDURE — 99213 PR OFFICE/OUTPT VISIT, EST, LEVL III, 20-29 MIN: ICD-10-PCS | Mod: ,,, | Performed by: FAMILY MEDICINE

## 2023-02-24 PROCEDURE — 99213 OFFICE O/P EST LOW 20 MIN: CPT | Mod: ,,, | Performed by: FAMILY MEDICINE

## 2023-02-24 RX ORDER — AMOXICILLIN AND CLAVULANATE POTASSIUM 875; 125 MG/1; MG/1
1 TABLET, FILM COATED ORAL 2 TIMES DAILY
Qty: 20 TABLET | Refills: 0 | Status: SHIPPED | OUTPATIENT
Start: 2023-02-24 | End: 2023-06-28

## 2023-02-24 RX ORDER — AMOXICILLIN AND CLAVULANATE POTASSIUM 875; 125 MG/1; MG/1
1 TABLET, FILM COATED ORAL 2 TIMES DAILY
Qty: 20 TABLET | Refills: 0 | Status: SHIPPED | OUTPATIENT
Start: 2023-02-24 | End: 2023-02-24

## 2023-02-24 NOTE — PROGRESS NOTES
Mikhail Stanford is a 68 y.o. male seen today for left ear discomfort nasal congestion and sinus pressure over the last 7-10 days.  Patient reports his nasal discharge has been thicker but not discolored although he has taken 2 days worth of Cipro that his wife had left over.    Past Medical History:   Diagnosis Date    COPD (chronic obstructive pulmonary disease)     Hypertension     Sleep apnea      Family History   Problem Relation Age of Onset    Cancer Mother     Diabetes Sister     Hyperlipidemia Sister      Current Outpatient Medications on File Prior to Visit   Medication Sig Dispense Refill    albuterol (PROVENTIL/VENTOLIN HFA) 90 mcg/actuation inhaler Inhale 1 puff into the lungs every 6 (six) hours as needed for Wheezing. 18 g 3    atorvastatin (LIPITOR) 40 MG tablet Take 1 tablet (40 mg total) by mouth every evening. 90 tablet 1    esomeprazole (NEXIUM) 40 MG capsule Take 1 capsule (40 mg total) by mouth 2 (two) times daily. 180 capsule 1    ezetimibe (ZETIA) 10 mg tablet TAKE 1 TABLET ONCE DAILY 90 tablet 1    fluticasone propionate (FLONASE) 50 mcg/actuation nasal spray 2 sprays (100 mcg total) by Each Nostril route once daily. 48 g 1    fluticasone-salmeterol diskus inhaler 250-50 mcg USE 1 INHALATION ORALLY    ONCE DAILY AT 6AM 90 each 1    pantoprazole (PROTONIX) 20 MG tablet Take 1 tablet (20 mg total) by mouth once daily. 30 tablet 11    tadalafiL (CIALIS) 20 MG Tab Take 1 tablet (20 mg total) by mouth daily as needed (intercourse). 24 tablet 3    tiotropium (SPIRIVA WITH HANDIHALER) 18 mcg inhalation capsule INHALE THE CONTENTS OF 1   CAPSULE (VIA 2 INHALATIONS)DAILY 90 capsule 1    valsartan-hydrochlorothiazide (DIOVAN-HCT) 160-12.5 mg per tablet Take 1 tablet by mouth once daily. 90 tablet 1     No current facility-administered medications on file prior to visit.     Immunization History   Administered Date(s) Administered    Pneumococcal Conjugate - 20 Valent 01/30/2023       Review of Systems    Constitutional:  Negative for fever, malaise/fatigue and weight loss.   HENT:  Positive for congestion, ear pain and sinus pain.    Respiratory:  Negative for shortness of breath.    Cardiovascular:  Negative for chest pain and palpitations.   Gastrointestinal:  Negative for nausea and vomiting.   Psychiatric/Behavioral:  Negative for depression.       Vitals:    02/24/23 1048   BP: 132/80   Pulse: 93   Resp: 19   Temp: 98.9 °F (37.2 °C)       Physical Exam  Vitals reviewed.   Constitutional:       Appearance: Normal appearance.   HENT:      Head: Normocephalic.      Right Ear: Ear canal normal. Tympanic membrane is retracted.      Left Ear: Ear canal normal. Tympanic membrane is injected and retracted.      Nose:      Right Turbinates: Swollen.      Left Turbinates: Swollen.      Right Sinus: No maxillary sinus tenderness or frontal sinus tenderness.      Left Sinus: No maxillary sinus tenderness or frontal sinus tenderness.   Eyes:      Extraocular Movements: Extraocular movements intact.      Conjunctiva/sclera: Conjunctivae normal.      Pupils: Pupils are equal, round, and reactive to light.   Neck:      Thyroid: No thyroid mass or thyromegaly.   Cardiovascular:      Rate and Rhythm: Normal rate and regular rhythm.      Heart sounds: Normal heart sounds. No murmur heard.    No gallop.   Pulmonary:      Effort: Pulmonary effort is normal. No respiratory distress.      Breath sounds: Normal breath sounds. No wheezing or rales.   Skin:     General: Skin is warm and dry.      Coloration: Skin is not jaundiced or pale.   Neurological:      Mental Status: He is alert.   Psychiatric:         Mood and Affect: Mood normal.         Behavior: Behavior normal.         Thought Content: Thought content normal.         Judgment: Judgment normal.        Assessment and Plan  Upper respiratory tract infection, unspecified type  -     amoxicillin-clavulanate 875-125mg (AUGMENTIN) 875-125 mg per tablet; Take 1 tablet by mouth 2  (two) times daily.  Dispense: 20 tablet; Refill: 0    Dysfunction of left eustachian tube  -     Ambulatory referral/consult to ENT; Future; Expected date: 03/03/2023    Other orders  -     Discontinue: amoxicillin-clavulanate 875-125mg (AUGMENTIN) 875-125 mg per tablet; Take 1 tablet by mouth 2 (two) times daily.  Dispense: 20 tablet; Refill: 0            Return to clinic in 3 months for follow-up with me and he was see ear nose and throat for his routine follow-up next week.    Health Maintenance Topics with due status: Not Due       Topic Last Completion Date    Lipid Panel 01/31/2023

## 2023-02-27 ENCOUNTER — PATIENT OUTREACH (OUTPATIENT)
Dept: ADMINISTRATIVE | Facility: HOSPITAL | Age: 68
End: 2023-02-27

## 2023-03-02 ENCOUNTER — OFFICE VISIT (OUTPATIENT)
Dept: OTOLARYNGOLOGY | Facility: CLINIC | Age: 68
End: 2023-03-02
Payer: COMMERCIAL

## 2023-03-02 VITALS — WEIGHT: 225 LBS | BODY MASS INDEX: 31.5 KG/M2 | HEIGHT: 71 IN

## 2023-03-02 DIAGNOSIS — H69.92 DYSFUNCTION OF LEFT EUSTACHIAN TUBE: ICD-10-CM

## 2023-03-02 DIAGNOSIS — K21.9 GASTROESOPHAGEAL REFLUX DISEASE WITHOUT ESOPHAGITIS: ICD-10-CM

## 2023-03-02 DIAGNOSIS — J02.9 PHARYNGITIS, UNSPECIFIED ETIOLOGY: ICD-10-CM

## 2023-03-02 DIAGNOSIS — H90.3 SENSORINEURAL HEARING LOSS (SNHL) OF BOTH EARS: Primary | ICD-10-CM

## 2023-03-02 PROCEDURE — 99214 PR OFFICE/OUTPT VISIT, EST, LEVL IV, 30-39 MIN: ICD-10-PCS | Mod: S$PBB,24,, | Performed by: OTOLARYNGOLOGY

## 2023-03-02 PROCEDURE — 99214 OFFICE O/P EST MOD 30 MIN: CPT | Mod: PBBFAC | Performed by: OTOLARYNGOLOGY

## 2023-03-02 PROCEDURE — 99214 OFFICE O/P EST MOD 30 MIN: CPT | Mod: S$PBB,24,, | Performed by: OTOLARYNGOLOGY

## 2023-03-02 NOTE — PROGRESS NOTES
Subjective:       Patient ID: Mikhail Stanford is a 68 y.o. male.    Chief Complaint: Follow-up (Patient is a post op follow up. He is doing well. )    HPI  Review of Systems   Constitutional:  Negative for chills, fatigue and fever.   HENT:  Positive for ear pain, hearing loss and sore throat.    Gastrointestinal:  Positive for reflux.       Objective:      Physical Exam  Constitutional:       Appearance: Normal appearance.   HENT:      Head: Normocephalic.      Right Ear: Tympanic membrane, ear canal and external ear normal.      Left Ear: Tympanic membrane, ear canal and external ear normal.      Nose: Nose normal.      Mouth/Throat:      Lips: Pink. No lesions.      Mouth: Mucous membranes are moist.      Pharynx: Oropharynx is clear.     Eyes:      Pupils: Pupils are equal, round, and reactive to light.   Pulmonary:      Effort: Pulmonary effort is normal.   Skin:     General: Skin is warm and dry.   Neurological:      Mental Status: He is alert and oriented to person, place, and time.   Psychiatric:         Mood and Affect: Mood normal.         Behavior: Behavior is cooperative.       Assessment:       Problem List Items Addressed This Visit          GI    GERD (gastroesophageal reflux disease)     Other Visit Diagnoses       Sensorineural hearing loss (SNHL) of both ears    -  Primary    Dysfunction of left eustachian tube        Pharyngitis, unspecified etiology                  Plan:   Schedule hearing test.  See GI may need FOL

## 2023-04-13 ENCOUNTER — TELEPHONE (OUTPATIENT)
Dept: NEPHROLOGY | Facility: CLINIC | Age: 68
End: 2023-04-13
Payer: COMMERCIAL

## 2023-04-13 NOTE — TELEPHONE ENCOUNTER
Spoke with Abdoulaye and let him know that she rounds in the hospital and that she also rounds at dialysis clinics in the morning and that we do not do clinics in the morning. He stated that he is going to keep his appt date and time.

## 2023-04-13 NOTE — TELEPHONE ENCOUNTER
----- Message from Estefania Martinez sent at 4/13/2023 11:06 AM CDT -----  Regarding: apt  Pt had apt on April 17th and he wanted to know if there was anyway he could be seen that morning. I knew at one time she was seeing a few in the morning but didn't know if she had stopped that. Call back [phone]

## 2023-04-14 RX ORDER — CHLORHEXIDINE GLUCONATE ORAL RINSE 1.2 MG/ML
5 SOLUTION DENTAL 3 TIMES DAILY
COMMUNITY
Start: 2023-04-06 | End: 2023-04-17

## 2023-04-17 ENCOUNTER — OFFICE VISIT (OUTPATIENT)
Dept: NEPHROLOGY | Facility: CLINIC | Age: 68
End: 2023-04-17
Payer: COMMERCIAL

## 2023-04-17 VITALS
SYSTOLIC BLOOD PRESSURE: 124 MMHG | OXYGEN SATURATION: 92 % | BODY MASS INDEX: 32.62 KG/M2 | WEIGHT: 233 LBS | TEMPERATURE: 98 F | HEIGHT: 71 IN | DIASTOLIC BLOOD PRESSURE: 82 MMHG | HEART RATE: 116 BPM | RESPIRATION RATE: 18 BRPM

## 2023-04-17 DIAGNOSIS — N28.9 RENAL INSUFFICIENCY: ICD-10-CM

## 2023-04-17 DIAGNOSIS — I12.9 HYPERTENSIVE NEPHROSCLEROSIS, STAGE 1 THROUGH STAGE 4 OR UNSPECIFIED CHRONIC KIDNEY DISEASE: ICD-10-CM

## 2023-04-17 DIAGNOSIS — I10 ESSENTIAL HYPERTENSION: ICD-10-CM

## 2023-04-17 DIAGNOSIS — N18.31 STAGE 3A CHRONIC KIDNEY DISEASE: Primary | ICD-10-CM

## 2023-04-17 PROCEDURE — 99215 OFFICE O/P EST HI 40 MIN: CPT | Mod: PBBFAC | Performed by: INTERNAL MEDICINE

## 2023-04-17 PROCEDURE — 99204 PR OFFICE/OUTPT VISIT, NEW, LEVL IV, 45-59 MIN: ICD-10-PCS | Mod: S$PBB,,, | Performed by: INTERNAL MEDICINE

## 2023-04-17 PROCEDURE — 99204 OFFICE O/P NEW MOD 45 MIN: CPT | Mod: S$PBB,,, | Performed by: INTERNAL MEDICINE

## 2023-04-17 RX ORDER — ATORVASTATIN CALCIUM 40 MG/1
TABLET, FILM COATED ORAL
COMMUNITY
End: 2023-10-04 | Stop reason: SDUPTHER

## 2023-04-17 RX ORDER — VALSARTAN AND HYDROCHLOROTHIAZIDE 160; 12.5 MG/1; MG/1
TABLET, FILM COATED ORAL
COMMUNITY
End: 2023-10-04 | Stop reason: SDUPTHER

## 2023-04-17 RX ORDER — ESOMEPRAZOLE MAGNESIUM 40 MG/1
1 CAPSULE, DELAYED RELEASE ORAL
COMMUNITY
End: 2023-09-13 | Stop reason: SDUPTHER

## 2023-04-17 RX ORDER — TIOTROPIUM BROMIDE INHALATION SPRAY 3.12 UG/1
SPRAY, METERED RESPIRATORY (INHALATION)
COMMUNITY
End: 2023-10-10 | Stop reason: ALTCHOICE

## 2023-04-17 RX ORDER — EZETIMIBE 10 MG/1
TABLET ORAL
COMMUNITY
End: 2023-09-13 | Stop reason: SDUPTHER

## 2023-04-17 RX ORDER — FLUTICASONE PROPIONATE AND SALMETEROL 250; 50 UG/1; UG/1
1 POWDER RESPIRATORY (INHALATION)
COMMUNITY
End: 2023-04-17 | Stop reason: SDUPTHER

## 2023-04-17 RX ORDER — TADALAFIL 10 MG/1
TABLET ORAL
COMMUNITY
End: 2023-07-12

## 2023-04-17 NOTE — PROGRESS NOTES
" [facility] History and Physical  Patient Name: Mikhail Stanford  MRN: [id number]  Age: 68 y.o.  : 1955    Date: 2023 3:01 PM    Chief Complaint: Establish Care    HPI :   Mr Stanford presents to Nephrology clinic to establish care. He is followed by Dr. Vishnu Ballesteros MD as his primary care provider.     HTN: diagnosed over 20 years. Current regimen includes diovan 160-12.5 mg daily     DLD: On statin,zetia     Hepatatis: treated in the past unsure which kind.     Nephrology history:     Patient denies any CP, SOB, peripheral edema, dysuria, hematuria, changes in urinary habits, or increased frequency of urination. No FH of kidney disease, no nephrolithiasis, or recurrent UTIs. No OTC medications including NSAIDs. He no longer taking aleve.  He reports very foamy frothy urine.     Past Medical History:  has a past medical history of COPD (chronic obstructive pulmonary disease), Hypertension, and Sleep apnea.     Past Surgical History:   has a past surgical history that includes Dental surgery; Excision of lesion of lip (N/A, 2023); and Hernia repair (2020).     Family History:  family history includes Cancer in his mother; Diabetes in his sister; Hyperlipidemia in his sister. No family history of kidney disease.     Social History:   reports that he has quit smoking. His smoking use included cigarettes. He has a 50.00 pack-year smoking history. He has been exposed to tobacco smoke. He quit smokeless tobacco use about 17 years ago.  His smokeless tobacco use included chew. He reports that he does not currently use alcohol. He reports that he does not currently use drugs after having used the following drugs: Marijuana, "Crack" cocaine, and Heroin. Has two twin sons that are 15. He quit drugs 30 years ago. He raised his sons solo he says. He is now retired.    Allergies: has No Known Allergies.     Medications: Reviewed including OTC " medications, herbal supplements, and NSAIDS.     Old records have been reviewed.      Review of Systems:  ROS: A 10 point ROS was completed and found to be negative except for that mentioned above.          Physical Exam:  Vitals:    04/17/23 1450   BP: 124/82   Pulse: (!) 116   Resp: 18   Temp: 98 °F (36.7 °C)       Constitutional: sitting in chair, in NAD  Eyes: EOMI, white sclera  ENMT: moist mucus membranes, nares patent  Cardiovascular: normal rate, S1/S2 noted, no edema  Respiratory: symmetrical chest expansion, CTA-B  Gastrointestinal: +BS, soft, NT/ND  Musculoskeletal: normal, no joint erythema/effusions  Skin: no rash, no purpura, warm extremities  Neurological: Alert and Oriented x 4, afocal    Labs:   Lab Results   Component Value Date     01/31/2023    K 4.0 01/31/2023    CREATININE 1.59 (H) 01/31/2023    ALT 40 01/31/2023    AST 23 01/31/2023    LDLCALC 73 01/31/2023    CHOL 157 01/31/2023    HDL 65 (H) 01/31/2023    TRIG 96 01/31/2023    WBC 5.19 01/31/2023    HGB 14.3 01/31/2023    HCT 45.5 01/31/2023     01/31/2023    PSA 0.603 01/31/2023        Otherwise Reviewed    Assessment/Plan:       CKD stage IIIa in setting of suspected hypertensive nephrosclerosis. Reports foamy urine suspicious for proteinuria. Will quantify proteinuria and add further work up if necessary given his history of drug abuse. Will obtain HIV, Hepatitis panel today. He reports prior hepatitis that was treated. Unsure what type. Unsure of treatment.  Baseline sCr 1.5, today sCr pending. Counseled to avoid nephrotoxic agents such as NSAIDs. Will obtain baseline renal ultrasound.    Proteinuria: urine Prot:Creat ratio is pending. Patient is on RAAS blockade with ARB    Anemia: CBC today     BMD: Calcium and Phosphorus PTH Vit D pending    HTN: well controlled with current meds     RTC 3 months with CBC, RFP, UA, urine for Prot:creat ratio, PTH, Vit D      Alisha S. Parker, DO [facility]   04/17/2023

## 2023-04-19 ENCOUNTER — TELEPHONE (OUTPATIENT)
Dept: NEPHROLOGY | Facility: CLINIC | Age: 68
End: 2023-04-19
Payer: COMMERCIAL

## 2023-04-19 DIAGNOSIS — E55.9 VITAMIN D DEFICIENCY, UNSPECIFIED: Primary | ICD-10-CM

## 2023-04-19 DIAGNOSIS — R80.9 PROTEINURIA: ICD-10-CM

## 2023-04-19 RX ORDER — ERGOCALCIFEROL 1.25 MG/1
50000 CAPSULE ORAL
Qty: 12 CAPSULE | Refills: 0 | Status: SHIPPED | OUTPATIENT
Start: 2023-04-19 | End: 2023-07-06

## 2023-04-19 NOTE — TELEPHONE ENCOUNTER
----- Message from Jocelyn Avila, DO sent at 4/18/2023  4:04 PM CDT -----  Please let Mr Stanford know that his renal function is stable. He does have protein in his urine. For this I recommend that we check all of the remaining proteinuria labs if he could swing back by at his convenience to get the labs collected. Please add hepatitis c PCR RNA in addition to our routine proteinuria blood work. He also has low vitamin D levels. Please send ergocalciferol. TY

## 2023-04-19 NOTE — TELEPHONE ENCOUNTER
Spoke w/ , he is aware of the lab work that needs to be drawn and the medication that needs to be picked up.

## 2023-04-26 ENCOUNTER — HOSPITAL ENCOUNTER (OUTPATIENT)
Dept: RADIOLOGY | Facility: HOSPITAL | Age: 68
Discharge: HOME | End: 2023-04-26
Attending: INTERNAL MEDICINE
Payer: COMMERCIAL

## 2023-04-26 DIAGNOSIS — N28.9 RENAL INSUFFICIENCY: ICD-10-CM

## 2023-04-26 PROCEDURE — 76770 US KIDNEY: ICD-10-PCS | Mod: 26,,, | Performed by: RADIOLOGY

## 2023-04-26 PROCEDURE — 76770 US EXAM ABDO BACK WALL COMP: CPT | Mod: 26,,, | Performed by: RADIOLOGY

## 2023-04-26 PROCEDURE — 76770 US EXAM ABDO BACK WALL COMP: CPT | Mod: TC

## 2023-04-28 ENCOUNTER — TELEPHONE (OUTPATIENT)
Dept: NEPHROLOGY | Facility: CLINIC | Age: 68
End: 2023-04-28
Payer: COMMERCIAL

## 2023-04-28 NOTE — TELEPHONE ENCOUNTER
Proteinuria workup with serum protein electrophoresis demonstrates a small monoclonal band with the urine IEP confirming a monoclonal IgG kappa protein. Will plan to repeat studies next visit. Discussed with pt. Voiced understanding.

## 2023-05-15 ENCOUNTER — OFFICE VISIT (OUTPATIENT)
Dept: FAMILY MEDICINE | Facility: CLINIC | Age: 68
End: 2023-05-15
Payer: COMMERCIAL

## 2023-05-15 VITALS
HEIGHT: 71 IN | BODY MASS INDEX: 33.06 KG/M2 | SYSTOLIC BLOOD PRESSURE: 118 MMHG | OXYGEN SATURATION: 99 % | RESPIRATION RATE: 18 BRPM | HEART RATE: 74 BPM | DIASTOLIC BLOOD PRESSURE: 74 MMHG | WEIGHT: 236.19 LBS | TEMPERATURE: 98 F

## 2023-05-15 DIAGNOSIS — I10 HYPERTENSION, UNSPECIFIED TYPE: Primary | ICD-10-CM

## 2023-05-15 PROCEDURE — 99212 OFFICE O/P EST SF 10 MIN: CPT | Mod: ,,, | Performed by: FAMILY MEDICINE

## 2023-05-15 PROCEDURE — 99212 PR OFFICE/OUTPT VISIT, EST, LEVL II, 10-19 MIN: ICD-10-PCS | Mod: ,,, | Performed by: FAMILY MEDICINE

## 2023-05-15 RX ORDER — CHLORHEXIDINE GLUCONATE ORAL RINSE 1.2 MG/ML
5 SOLUTION DENTAL 3 TIMES DAILY
COMMUNITY
Start: 2023-04-28

## 2023-05-15 RX ORDER — TIOTROPIUM BROMIDE 18 UG/1
1 CAPSULE ORAL; RESPIRATORY (INHALATION)
COMMUNITY
Start: 2023-04-28 | End: 2023-10-10

## 2023-05-15 NOTE — PROGRESS NOTES
Mikhail Stanford is a 68 y.o. male seen today for follow-up on his hypertension and COPD.  Earlier in the year patient's lipids were to goal and his blood pressures have been well controlled.  He reports his sinus symptoms are improved and denies any chest pain shortness a breath above baseline.  He has had no recent COPD exacerbations but I did recommend he have his COVID booster.  Patient's PSA was to goal in checked in January.  He is up-to-date on his colon cancer screening.      Past Medical History:   Diagnosis Date    COPD (chronic obstructive pulmonary disease)     Hypertension     Sleep apnea      Family History   Problem Relation Age of Onset    Cancer Mother     Diabetes Sister     Hyperlipidemia Sister      Current Outpatient Medications on File Prior to Visit   Medication Sig Dispense Refill    atorvastatin (LIPITOR) 40 MG tablet 1 tablet Orally Once a day      chlorhexidine (PERIDEX) 0.12 % solution 5 mLs 3 (three) times daily.      ergocalciferol (ERGOCALCIFEROL) 50,000 unit Cap Take 1 capsule (50,000 Units total) by mouth every 7 days. for 12 doses 12 capsule 0    esomeprazole (NEXIUM) 40 MG capsule 1 capsule.      ezetimibe (ZETIA) 10 mg tablet 1 tablet Orally Once a day for 90 DAYS      fluticasone propionate (FLONASE) 50 mcg/actuation nasal spray 2 sprays (100 mcg total) by Each Nostril route once daily. 48 g 1    fluticasone-salmeterol diskus inhaler 250-50 mcg USE 1 INHALATION ORALLY    ONCE DAILY AT 6AM 90 each 1    SPIRIVA WITH HANDIHALER 18 mcg inhalation capsule Inhale 1 capsule into the lungs.      tadalafiL (CIALIS) 10 MG tablet 1 tablet as needed Orally for 30 day(s)      tiotropium bromide (SPIRIVA RESPIMAT) 2.5 mcg/actuation inhaler 2 puffs Inhalation Once a day      valsartan-hydrochlorothiazide (DIOVAN-HCT) 160-12.5 mg per tablet 1 tablet Orally Once a day      amoxicillin-clavulanate 875-125mg (AUGMENTIN) 875-125 mg per tablet Take 1 tablet by mouth 2 (two) times daily. (Patient not  taking: Reported on 5/15/2023) 20 tablet 0    pantoprazole (PROTONIX) 20 MG tablet Take 1 tablet (20 mg total) by mouth once daily. (Patient not taking: Reported on 4/17/2023) 30 tablet 11     No current facility-administered medications on file prior to visit.     Immunization History   Administered Date(s) Administered    Pneumococcal Conjugate - 20 Valent 01/30/2023       Review of Systems   Constitutional:  Negative for fever, malaise/fatigue and weight loss.   HENT:  Positive for congestion.    Respiratory:  Negative for shortness of breath.    Cardiovascular:  Negative for chest pain and palpitations.   Gastrointestinal:  Negative for nausea and vomiting.   Psychiatric/Behavioral:  Negative for depression.       Vitals:    05/15/23 0956   BP: 118/74   Pulse: 74   Resp: 18   Temp: 97.8 °F (36.6 °C)       Physical Exam  Vitals reviewed.   Constitutional:       Appearance: Normal appearance.   HENT:      Head: Normocephalic.   Eyes:      Extraocular Movements: Extraocular movements intact.      Conjunctiva/sclera: Conjunctivae normal.      Pupils: Pupils are equal, round, and reactive to light.   Neck:      Thyroid: No thyroid mass or thyromegaly.   Cardiovascular:      Rate and Rhythm: Normal rate and regular rhythm.      Heart sounds: Normal heart sounds. No murmur heard.    No gallop.   Pulmonary:      Effort: Pulmonary effort is normal. No respiratory distress.      Breath sounds: Normal breath sounds. No wheezing or rales.   Skin:     General: Skin is warm and dry.      Coloration: Skin is not jaundiced or pale.   Neurological:      Mental Status: He is alert.   Psychiatric:         Mood and Affect: Mood normal.         Behavior: Behavior normal.         Thought Content: Thought content normal.         Judgment: Judgment normal.        Assessment and Plan  Hypertension, unspecified type            Return to clinic in 6 months or as needed.    Health Maintenance Topics with due status: Not Due       Topic  Last Completion Date    Lipid Panel 01/31/2023    Hemoglobin A1c (Diabetic Prevention Screening) 04/21/2023

## 2023-05-19 DIAGNOSIS — R05.9 COUGH: ICD-10-CM

## 2023-05-21 RX ORDER — FLUTICASONE PROPIONATE AND SALMETEROL 250; 50 UG/1; UG/1
POWDER RESPIRATORY (INHALATION)
Qty: 60 EACH | Refills: 1 | Status: SHIPPED | OUTPATIENT
Start: 2023-05-21 | End: 2023-11-17 | Stop reason: SDUPTHER

## 2023-05-31 ENCOUNTER — TELEPHONE (OUTPATIENT)
Dept: PULMONOLOGY | Facility: CLINIC | Age: 68
End: 2023-05-31
Payer: COMMERCIAL

## 2023-05-31 NOTE — TELEPHONE ENCOUNTER
Pt called stating he missed his 5/19 Appt & wanted it rescheduled. I did it for the date he return to see Jeremias 06/28. CT 1100 am & see his at 100 pm. Pt voiced understanding.

## 2023-06-28 ENCOUNTER — HOSPITAL ENCOUNTER (OUTPATIENT)
Dept: RADIOLOGY | Facility: HOSPITAL | Age: 68
Discharge: HOME | End: 2023-06-28
Attending: INTERNAL MEDICINE
Payer: COMMERCIAL

## 2023-06-28 ENCOUNTER — OFFICE VISIT (OUTPATIENT)
Dept: CARDIOLOGY | Facility: CLINIC | Age: 68
End: 2023-06-28
Payer: COMMERCIAL

## 2023-06-28 ENCOUNTER — OFFICE VISIT (OUTPATIENT)
Dept: PULMONOLOGY | Facility: CLINIC | Age: 68
End: 2023-06-28
Payer: COMMERCIAL

## 2023-06-28 VITALS
HEART RATE: 113 BPM | SYSTOLIC BLOOD PRESSURE: 112 MMHG | HEIGHT: 71 IN | DIASTOLIC BLOOD PRESSURE: 66 MMHG | OXYGEN SATURATION: 95 % | WEIGHT: 231 LBS | RESPIRATION RATE: 18 BRPM | BODY MASS INDEX: 32.34 KG/M2

## 2023-06-28 VITALS
HEART RATE: 104 BPM | HEIGHT: 71 IN | WEIGHT: 231 LBS | DIASTOLIC BLOOD PRESSURE: 60 MMHG | BODY MASS INDEX: 32.34 KG/M2 | RESPIRATION RATE: 18 BRPM | SYSTOLIC BLOOD PRESSURE: 90 MMHG

## 2023-06-28 DIAGNOSIS — I10 ESSENTIAL HYPERTENSION: Primary | ICD-10-CM

## 2023-06-28 DIAGNOSIS — J44.9 CHRONIC OBSTRUCTIVE PULMONARY DISEASE, UNSPECIFIED COPD TYPE: Primary | ICD-10-CM

## 2023-06-28 DIAGNOSIS — R91.8 LUNG MASS: ICD-10-CM

## 2023-06-28 DIAGNOSIS — R06.02 SOB (SHORTNESS OF BREATH): Primary | ICD-10-CM

## 2023-06-28 DIAGNOSIS — I10 ESSENTIAL HYPERTENSION: ICD-10-CM

## 2023-06-28 DIAGNOSIS — R22.2 LOCALIZED SWELLING, MASS AND LUMP, TRUNK: ICD-10-CM

## 2023-06-28 PROCEDURE — 71250 CT THORAX DX C-: CPT | Mod: 26,,, | Performed by: STUDENT IN AN ORGANIZED HEALTH CARE EDUCATION/TRAINING PROGRAM

## 2023-06-28 PROCEDURE — 93010 ELECTROCARDIOGRAM REPORT: CPT | Mod: S$PBB,,, | Performed by: STUDENT IN AN ORGANIZED HEALTH CARE EDUCATION/TRAINING PROGRAM

## 2023-06-28 PROCEDURE — 99214 OFFICE O/P EST MOD 30 MIN: CPT | Mod: S$PBB,,, | Performed by: INTERNAL MEDICINE

## 2023-06-28 PROCEDURE — 93010 EKG 12-LEAD: ICD-10-PCS | Mod: S$PBB,,, | Performed by: STUDENT IN AN ORGANIZED HEALTH CARE EDUCATION/TRAINING PROGRAM

## 2023-06-28 PROCEDURE — 71250 CT THORAX DX C-: CPT | Mod: TC

## 2023-06-28 PROCEDURE — 99214 PR OFFICE/OUTPT VISIT, EST, LEVL IV, 30-39 MIN: ICD-10-PCS | Mod: S$PBB,,, | Performed by: INTERNAL MEDICINE

## 2023-06-28 PROCEDURE — 99214 PR OFFICE/OUTPT VISIT, EST, LEVL IV, 30-39 MIN: ICD-10-PCS | Mod: S$PBB,,, | Performed by: STUDENT IN AN ORGANIZED HEALTH CARE EDUCATION/TRAINING PROGRAM

## 2023-06-28 PROCEDURE — 71250 CT CHEST WITHOUT CONTRAST: ICD-10-PCS | Mod: 26,,, | Performed by: STUDENT IN AN ORGANIZED HEALTH CARE EDUCATION/TRAINING PROGRAM

## 2023-06-28 PROCEDURE — 99215 OFFICE O/P EST HI 40 MIN: CPT | Mod: PBBFAC,25 | Performed by: INTERNAL MEDICINE

## 2023-06-28 PROCEDURE — 99214 OFFICE O/P EST MOD 30 MIN: CPT | Mod: S$PBB,,, | Performed by: STUDENT IN AN ORGANIZED HEALTH CARE EDUCATION/TRAINING PROGRAM

## 2023-06-28 PROCEDURE — 99214 OFFICE O/P EST MOD 30 MIN: CPT | Mod: PBBFAC,25 | Performed by: STUDENT IN AN ORGANIZED HEALTH CARE EDUCATION/TRAINING PROGRAM

## 2023-06-28 PROCEDURE — 93005 ELECTROCARDIOGRAM TRACING: CPT | Mod: PBBFAC | Performed by: STUDENT IN AN ORGANIZED HEALTH CARE EDUCATION/TRAINING PROGRAM

## 2023-06-28 RX ORDER — FLUTICASONE FUROATE, UMECLIDINIUM BROMIDE AND VILANTEROL TRIFENATATE 200; 62.5; 25 UG/1; UG/1; UG/1
1 POWDER RESPIRATORY (INHALATION) DAILY
Qty: 60 EACH | Refills: 5 | Status: SHIPPED | OUTPATIENT
Start: 2023-06-28 | End: 2023-08-02 | Stop reason: ALTCHOICE

## 2023-06-28 RX ORDER — ALBUTEROL SULFATE 90 UG/1
2 AEROSOL, METERED RESPIRATORY (INHALATION) EVERY 6 HOURS PRN
Qty: 18 G | Refills: 5 | Status: SHIPPED | OUTPATIENT
Start: 2023-06-28 | End: 2023-08-02 | Stop reason: SDUPTHER

## 2023-06-28 RX ORDER — TADALAFIL 20 MG/1
TABLET ORAL
COMMUNITY
Start: 2023-05-29 | End: 2023-07-12

## 2023-06-28 NOTE — PROGRESS NOTES
PCP: Vishnu Ballesteros MD    Referring Provider:     Subjective:   Mikhail Stanford is a 68 y.o. male with hx of HTN, HLD who presents for followup    6/28/23 - Patient continues to have exertional fatigue and SOB despite being on therapy for COPD. His BP is low for initiation of BB or CCB    5/27/22 - Patient states he has some shortness of breath.  He does not exercise often. Blood pressure controlled.  Has follow up with Dr. Castellano     2/14/22 - Patient of Dr. Castellano. Patient reports chronic issues with shortness of breath at rest and with exertion. Associated with runny nose. Episode occur daily. For last 2 years.     Fhx: non-contributary  Shx: Denies smoking, Hx of remote drug user (>30 years). No etoh    EKG 2/14/22 - NSR - normal  ECHO 2/2022 - normal LV and size fxn. Mild LAE, mitral valve thickening without stenosis. Indeterminate diastolic dysfunction  STRESS TEST 2/14/22 - negative for ischemia.  The patient exercised for 6 minutes 43 seconds, functional capacity of 8 METS.  The patient reported shortness of breath during the stress test. Denied chest pain/pressure/tightness.       Lab Results   Component Value Date     04/17/2023    K 4.2 04/17/2023     04/17/2023    CO2 31 04/17/2023    BUN 25 (H) 04/17/2023    CREATININE 1.51 (H) 04/17/2023    CALCIUM 9.4 04/17/2023    ANIONGAP 9 04/17/2023    ESTGFRAFRICA 59 (L) 10/04/2021    EGFRNONAA 43 (L) 06/13/2022       Lab Results   Component Value Date    CHOL 157 01/31/2023    CHOL 126 10/04/2021     Lab Results   Component Value Date    HDL 65 (H) 01/31/2023    HDL 67 (H) 10/04/2021     Lab Results   Component Value Date    LDLCALC 73 01/31/2023    LDLCALC 49 10/04/2021     Lab Results   Component Value Date    TRIG 96 01/31/2023    TRIG 49 10/04/2021     Lab Results   Component Value Date    CHOLHDL 2.4 01/31/2023    CHOLHDL 1.9 10/04/2021       Lab Results   Component Value Date    WBC 5.90 04/17/2023    HGB 14.1 04/17/2023    HCT 44.0  "04/17/2023    MCV 89.2 04/17/2023     04/17/2023           Review of Systems   Constitutional:  Positive for malaise/fatigue.   Respiratory:  Positive for shortness of breath. Negative for cough.    Cardiovascular:  Negative for chest pain, palpitations, orthopnea, claudication, leg swelling and PND.       Objective:   BP 90/60   Pulse 104   Resp 18   Ht 5' 11" (1.803 m)   Wt 104.8 kg (231 lb)   BMI 32.22 kg/m²     Physical Exam  Vitals and nursing note reviewed.   Cardiovascular:      Rate and Rhythm: Normal rate and regular rhythm.      Pulses: Normal pulses.      Heart sounds: Normal heart sounds.   Pulmonary:      Breath sounds: Normal breath sounds.   Musculoskeletal:      Right lower leg: No edema.      Left lower leg: No edema.   Neurological:      Mental Status: He is oriented to person, place, and time.         Assessment:     1. SOB (shortness of breath)  CBC Auto Differential    Basic Metabolic Panel              Plan:   SOB (shortness of breath)  Chronic; despites adequate COPD therapy continue to have exertional SOB.   Stress test - 7 mins; 8 METs - normal   CT chest without  Coronary calcification.   Given persistence of symptoms and unable to give trial of BB or CCB due to low BP - will plan for [facility]  ECHO with normal LVEF; indeterminate diastolic dysfunction        "

## 2023-06-28 NOTE — H&P (VIEW-ONLY)
PCP: Vishnu Ballesteros MD    Referring Provider:     Subjective:   Mikhail Stanford is a 68 y.o. male with hx of HTN, HLD who presents for followup    6/28/23 - Patient continues to have exertional fatigue and SOB despite being on therapy for COPD. His BP is low for initiation of BB or CCB    5/27/22 - Patient states he has some shortness of breath.  He does not exercise often. Blood pressure controlled.  Has follow up with Dr. Castellano     2/14/22 - Patient of Dr. Castellano. Patient reports chronic issues with shortness of breath at rest and with exertion. Associated with runny nose. Episode occur daily. For last 2 years.     Fhx: non-contributary  Shx: Denies smoking, Hx of remote drug user (>30 years). No etoh    EKG 2/14/22 - NSR - normal  ECHO 2/2022 - normal LV and size fxn. Mild LAE, mitral valve thickening without stenosis. Indeterminate diastolic dysfunction  STRESS TEST 2/14/22 - negative for ischemia.  The patient exercised for 6 minutes 43 seconds, functional capacity of 8 METS.  The patient reported shortness of breath during the stress test. Denied chest pain/pressure/tightness.       Lab Results   Component Value Date     04/17/2023    K 4.2 04/17/2023     04/17/2023    CO2 31 04/17/2023    BUN 25 (H) 04/17/2023    CREATININE 1.51 (H) 04/17/2023    CALCIUM 9.4 04/17/2023    ANIONGAP 9 04/17/2023    ESTGFRAFRICA 59 (L) 10/04/2021    EGFRNONAA 43 (L) 06/13/2022       Lab Results   Component Value Date    CHOL 157 01/31/2023    CHOL 126 10/04/2021     Lab Results   Component Value Date    HDL 65 (H) 01/31/2023    HDL 67 (H) 10/04/2021     Lab Results   Component Value Date    LDLCALC 73 01/31/2023    LDLCALC 49 10/04/2021     Lab Results   Component Value Date    TRIG 96 01/31/2023    TRIG 49 10/04/2021     Lab Results   Component Value Date    CHOLHDL 2.4 01/31/2023    CHOLHDL 1.9 10/04/2021       Lab Results   Component Value Date    WBC 5.90 04/17/2023    HGB 14.1 04/17/2023    HCT 44.0  "04/17/2023    MCV 89.2 04/17/2023     04/17/2023           Review of Systems   Constitutional:  Positive for malaise/fatigue.   Respiratory:  Positive for shortness of breath. Negative for cough.    Cardiovascular:  Negative for chest pain, palpitations, orthopnea, claudication, leg swelling and PND.       Objective:   BP 90/60   Pulse 104   Resp 18   Ht 5' 11" (1.803 m)   Wt 104.8 kg (231 lb)   BMI 32.22 kg/m²     Physical Exam  Vitals and nursing note reviewed.   Cardiovascular:      Rate and Rhythm: Normal rate and regular rhythm.      Pulses: Normal pulses.      Heart sounds: Normal heart sounds.   Pulmonary:      Breath sounds: Normal breath sounds.   Musculoskeletal:      Right lower leg: No edema.      Left lower leg: No edema.   Neurological:      Mental Status: He is oriented to person, place, and time.         Assessment:     1. SOB (shortness of breath)  CBC Auto Differential    Basic Metabolic Panel              Plan:   SOB (shortness of breath)  Chronic; despites adequate COPD therapy continue to have exertional SOB.   Stress test - 7 mins; 8 METs - normal   CT chest without  Coronary calcification.   Given persistence of symptoms and unable to give trial of BB or CCB due to low BP - will plan for [facility]  ECHO with normal LVEF; indeterminate diastolic dysfunction        "

## 2023-06-28 NOTE — ASSESSMENT & PLAN NOTE
Chronic; despites adequate COPD therapy continue to have exertional SOB.   Stress test - 7 mins; 8 METs - normal   CT chest without  Coronary calcification.   Given persistence of symptoms and unable to give trial of BB or CCB due to low BP - will plan for [facility]  ECHO with normal LVEF; indeterminate diastolic dysfunction

## 2023-06-28 NOTE — PROGRESS NOTES
"Subjective:       Patient ID: Mikhail Stanford is a 68 y.o. male.    Chief Complaint: Shortness of Breath, Lung Mass, and Follow-up (Patient states that he sometimes have chest pain.)    Shortness of Breath  This is a chronic problem. The current episode started more than 1 month ago. The problem has been unchanged. Pertinent negatives include no abdominal pain, chest pain, ear pain, headaches or rash.   Follow-up  Pertinent negatives include no abdominal pain, arthralgias, chest pain, chills, congestion, headaches or rash.   Past Medical History:   Diagnosis Date    COPD (chronic obstructive pulmonary disease)     Hypertension     Sleep apnea      Past Surgical History:   Procedure Laterality Date    DENTAL SURGERY      EXCISION OF LESION OF LIP N/A 02/21/2023    Procedure: EXCISION, LESION, LIP;  Surgeon: Kuldeep Yao MD;  Location: [facility];  Service: ENT;  Laterality: N/A;    HERNIA REPAIR  11/24/2020     Family History   Problem Relation Age of Onset    Cancer Mother     Diabetes Sister     Hyperlipidemia Sister      Review of patient's allergies indicates:  No Known Allergies   Social History     Tobacco Use    Smoking status: Former     Packs/day: 2.00     Years: 25.00     Pack years: 50.00     Types: Cigarettes     Passive exposure: Past    Smokeless tobacco: Former     Types: Chew     Quit date: 1/27/2006   Substance Use Topics    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   HENT:  Negative for congestion and ear pain.    Eyes:  Negative for redness and itching.   Respiratory:  Positive for shortness of breath.    Cardiovascular:  Negative for chest pain and palpitations.   Musculoskeletal:  Negative for arthralgias and back pain.   Skin:  Negative for rash.   Gastrointestinal:  Negative " for abdominal pain and abdominal distention.   Neurological:  Negative for dizziness and headaches.   Hematological:  Negative for adenopathy. Does not bruise/bleed easily.   Psychiatric/Behavioral:  Negative for confusion. The patient is not nervous/anxious.      Objective:      Physical Exam   Constitutional: He is oriented to person, place, and time. He appears well-developed and well-nourished.   HENT:   Head: Normocephalic.   Nose: Nose normal.   Mouth/Throat: Oropharynx is clear and moist.   Neck: No JVD present. No thyromegaly present.   Cardiovascular: Normal rate, regular rhythm, normal heart sounds and intact distal pulses.   Pulmonary/Chest: Normal expansion, hyperinflation, symmetric chest wall expansion, effort normal and breath sounds normal.   Abdominal: Soft. Bowel sounds are normal.   Musculoskeletal:         General: Normal range of motion.      Cervical back: Normal range of motion and neck supple.   Lymphadenopathy: No supraclavicular adenopathy is present.     He has no cervical adenopathy.   Neurological: He is alert and oriented to person, place, and time. He has normal reflexes.   Skin: Skin is warm and dry.   Psychiatric: He has a normal mood and affect. His behavior is normal.   Personal Diagnostic Review  none pertinent    No flowsheet data found.      Assessment:       1. Chronic obstructive pulmonary disease, unspecified COPD type    2. Lung mass        Outpatient Encounter Medications as of 6/28/2023   Medication Sig Dispense Refill    atorvastatin (LIPITOR) 40 MG tablet 1 tablet Orally Once a day      ergocalciferol (ERGOCALCIFEROL) 50,000 unit Cap Take 1 capsule (50,000 Units total) by mouth every 7 days. for 12 doses 12 capsule 0    esomeprazole (NEXIUM) 40 MG capsule 1 capsule.      ezetimibe (ZETIA) 10 mg tablet 1 tablet Orally Once a day for 90 DAYS      fluticasone propionate (FLONASE) 50 mcg/actuation nasal spray 2 sprays (100 mcg total) by Each Nostril route once daily. 48 g 1     fluticasone-salmeterol diskus inhaler 250-50 mcg USE 1 INHALATION ORALLY    ONCE DAILY AT 6AM 60 each 1    SPIRIVA WITH HANDIHALER 18 mcg inhalation capsule Inhale 1 capsule into the lungs.      tadalafiL (CIALIS) 10 MG tablet 1 tablet as needed Orally for 30 day(s)      tiotropium bromide (SPIRIVA RESPIMAT) 2.5 mcg/actuation inhaler 2 puffs Inhalation Once a day      valsartan-hydrochlorothiazide (DIOVAN-HCT) 160-12.5 mg per tablet 1 tablet Orally Once a day      albuterol (VENTOLIN HFA) 90 mcg/actuation inhaler Inhale 2 puffs into the lungs every 6 (six) hours as needed for Wheezing. Rescue 18 g 5    chlorhexidine (PERIDEX) 0.12 % solution 5 mLs 3 (three) times daily.      fluticasone-umeclidin-vilanter (TRELEGY ELLIPTA) 200-62.5-25 mcg inhaler Inhale 1 puff into the lungs once daily. 60 each 5    tadalafiL (CIALIS) 20 MG Tab       [DISCONTINUED] amoxicillin-clavulanate 875-125mg (AUGMENTIN) 875-125 mg per tablet Take 1 tablet by mouth 2 (two) times daily. (Patient not taking: Reported on 5/15/2023) 20 tablet 0    [DISCONTINUED] pantoprazole (PROTONIX) 20 MG tablet Take 1 tablet (20 mg total) by mouth once daily. (Patient not taking: Reported on 4/17/2023) 30 tablet 11     No facility-administered encounter medications on file as of 6/28/2023.     Orders Placed This Encounter   Procedures    Pulmonary function test     Standing Status:   Future     Standing Expiration Date:   6/28/2024     Order Specific Question:   Release to patient     Answer:   Immediate       Plan:       Problem List Items Addressed This Visit          Pulmonary    Chronic obstructive pulmonary disease - Primary     Patient has shortness of breath were going to add Trelegy and Ventolin to his regimen and get PFTs when he comes back to assess his pulmonary function make sure there is an restrictive component CT today's unremarkable         Relevant Medications    fluticasone-umeclidin-vilanter (TRELEGY ELLIPTA) 200-62.5-25 mcg inhaler     albuterol (VENTOLIN HFA) 90 mcg/actuation inhaler    Other Relevant Orders    Pulmonary function test    Lung mass     On CT this has resolved he will need a repeat lung scan screening CT in 1 year from today

## 2023-06-28 NOTE — ASSESSMENT & PLAN NOTE
Patient has shortness of breath were going to add Trelegy and Ventolin to his regimen and get PFTs when he comes back to assess his pulmonary function make sure there is an restrictive component CT today's unremarkable

## 2023-06-29 DIAGNOSIS — R06.02 SOB (SHORTNESS OF BREATH): Primary | ICD-10-CM

## 2023-06-29 RX ORDER — SODIUM CHLORIDE 0.9 % (FLUSH) 0.9 %
2 SYRINGE (ML) INJECTION
Status: CANCELLED | OUTPATIENT
Start: 2023-07-10

## 2023-07-10 ENCOUNTER — HOSPITAL ENCOUNTER (OUTPATIENT)
Facility: HOSPITAL | Age: 68
Discharge: HOME | End: 2023-07-10
Attending: STUDENT IN AN ORGANIZED HEALTH CARE EDUCATION/TRAINING PROGRAM | Admitting: STUDENT IN AN ORGANIZED HEALTH CARE EDUCATION/TRAINING PROGRAM
Payer: COMMERCIAL

## 2023-07-10 VITALS
HEART RATE: 86 BPM | SYSTOLIC BLOOD PRESSURE: 120 MMHG | OXYGEN SATURATION: 96 % | HEIGHT: 71 IN | TEMPERATURE: 98 F | DIASTOLIC BLOOD PRESSURE: 71 MMHG | RESPIRATION RATE: 15 BRPM | BODY MASS INDEX: 31.78 KG/M2 | WEIGHT: 227 LBS

## 2023-07-10 DIAGNOSIS — R06.02 SOB (SHORTNESS OF BREATH): ICD-10-CM

## 2023-07-10 DIAGNOSIS — Z01.818 PREOP EXAMINATION: ICD-10-CM

## 2023-07-10 DIAGNOSIS — R53.81 PHYSICAL DECONDITIONING: Primary | ICD-10-CM

## 2023-07-10 PROCEDURE — 25000003 PHARM REV CODE 250: Performed by: STUDENT IN AN ORGANIZED HEALTH CARE EDUCATION/TRAINING PROGRAM

## 2023-07-10 PROCEDURE — 93458 L HRT ARTERY/VENTRICLE ANGIO: CPT | Performed by: STUDENT IN AN ORGANIZED HEALTH CARE EDUCATION/TRAINING PROGRAM

## 2023-07-10 PROCEDURE — 99152 MOD SED SAME PHYS/QHP 5/>YRS: CPT | Mod: ,,, | Performed by: STUDENT IN AN ORGANIZED HEALTH CARE EDUCATION/TRAINING PROGRAM

## 2023-07-10 PROCEDURE — 99152 PR MOD CONSCIOUS SEDATION, SAME PHYS, 5+ YRS, FIRST 15 MIN: ICD-10-PCS | Mod: ,,, | Performed by: STUDENT IN AN ORGANIZED HEALTH CARE EDUCATION/TRAINING PROGRAM

## 2023-07-10 PROCEDURE — 99152 MOD SED SAME PHYS/QHP 5/>YRS: CPT | Performed by: STUDENT IN AN ORGANIZED HEALTH CARE EDUCATION/TRAINING PROGRAM

## 2023-07-10 PROCEDURE — 27201423 OPTIME MED/SURG SUP & DEVICES STERILE SUPPLY: Performed by: STUDENT IN AN ORGANIZED HEALTH CARE EDUCATION/TRAINING PROGRAM

## 2023-07-10 PROCEDURE — 99499 NO LOS: ICD-10-PCS | Mod: ,,, | Performed by: STUDENT IN AN ORGANIZED HEALTH CARE EDUCATION/TRAINING PROGRAM

## 2023-07-10 PROCEDURE — C1769 GUIDE WIRE: HCPCS | Performed by: STUDENT IN AN ORGANIZED HEALTH CARE EDUCATION/TRAINING PROGRAM

## 2023-07-10 PROCEDURE — 25500020 PHARM REV CODE 255: Performed by: STUDENT IN AN ORGANIZED HEALTH CARE EDUCATION/TRAINING PROGRAM

## 2023-07-10 PROCEDURE — C1887 CATHETER, GUIDING: HCPCS | Performed by: STUDENT IN AN ORGANIZED HEALTH CARE EDUCATION/TRAINING PROGRAM

## 2023-07-10 PROCEDURE — 99499 UNLISTED E&M SERVICE: CPT | Mod: ,,, | Performed by: STUDENT IN AN ORGANIZED HEALTH CARE EDUCATION/TRAINING PROGRAM

## 2023-07-10 PROCEDURE — C1894 INTRO/SHEATH, NON-LASER: HCPCS | Performed by: STUDENT IN AN ORGANIZED HEALTH CARE EDUCATION/TRAINING PROGRAM

## 2023-07-10 PROCEDURE — 63600175 PHARM REV CODE 636 W HCPCS: Performed by: STUDENT IN AN ORGANIZED HEALTH CARE EDUCATION/TRAINING PROGRAM

## 2023-07-10 PROCEDURE — 99153 MOD SED SAME PHYS/QHP EA: CPT | Performed by: STUDENT IN AN ORGANIZED HEALTH CARE EDUCATION/TRAINING PROGRAM

## 2023-07-10 PROCEDURE — 93458 PR CATH PLACE/CORON ANGIO, IMG SUPER/INTERP,W LEFT HEART VENTRICULOGRAPHY: ICD-10-PCS | Mod: 26,,, | Performed by: STUDENT IN AN ORGANIZED HEALTH CARE EDUCATION/TRAINING PROGRAM

## 2023-07-10 PROCEDURE — 93458 L HRT ARTERY/VENTRICLE ANGIO: CPT | Mod: 26,,, | Performed by: STUDENT IN AN ORGANIZED HEALTH CARE EDUCATION/TRAINING PROGRAM

## 2023-07-10 RX ORDER — ACETAMINOPHEN 325 MG/1
650 TABLET ORAL EVERY 4 HOURS PRN
Status: DISCONTINUED | OUTPATIENT
Start: 2023-07-10 | End: 2023-07-10 | Stop reason: HOSPADM

## 2023-07-10 RX ORDER — ONDANSETRON 4 MG/1
8 TABLET, ORALLY DISINTEGRATING ORAL EVERY 8 HOURS PRN
Status: DISCONTINUED | OUTPATIENT
Start: 2023-07-10 | End: 2023-07-10 | Stop reason: HOSPADM

## 2023-07-10 RX ORDER — SODIUM CHLORIDE 450 MG/100ML
INJECTION, SOLUTION INTRAVENOUS
Status: DISCONTINUED | OUTPATIENT
Start: 2023-07-10 | End: 2023-07-10 | Stop reason: HOSPADM

## 2023-07-10 RX ORDER — HEPARIN SODIUM 5000 [USP'U]/ML
INJECTION, SOLUTION INTRAVENOUS; SUBCUTANEOUS
Status: DISCONTINUED | OUTPATIENT
Start: 2023-07-10 | End: 2023-07-10 | Stop reason: HOSPADM

## 2023-07-10 RX ORDER — VERAPAMIL HYDROCHLORIDE 2.5 MG/ML
INJECTION, SOLUTION INTRAVENOUS
Status: DISCONTINUED | OUTPATIENT
Start: 2023-07-10 | End: 2023-07-10 | Stop reason: HOSPADM

## 2023-07-10 RX ORDER — MIDAZOLAM HYDROCHLORIDE 1 MG/ML
INJECTION INTRAMUSCULAR; INTRAVENOUS
Status: DISCONTINUED | OUTPATIENT
Start: 2023-07-10 | End: 2023-07-10 | Stop reason: HOSPADM

## 2023-07-10 RX ORDER — LIDOCAINE HYDROCHLORIDE 10 MG/ML
INJECTION INFILTRATION; PERINEURAL
Status: DISCONTINUED | OUTPATIENT
Start: 2023-07-10 | End: 2023-07-10 | Stop reason: HOSPADM

## 2023-07-10 RX ORDER — NITROGLYCERIN 5 MG/ML
INJECTION, SOLUTION INTRAVENOUS
Status: DISCONTINUED | OUTPATIENT
Start: 2023-07-10 | End: 2023-07-10 | Stop reason: HOSPADM

## 2023-07-10 RX ORDER — FENTANYL CITRATE 50 UG/ML
INJECTION, SOLUTION INTRAMUSCULAR; INTRAVENOUS
Status: DISCONTINUED | OUTPATIENT
Start: 2023-07-10 | End: 2023-07-10 | Stop reason: HOSPADM

## 2023-07-10 RX ORDER — SODIUM CHLORIDE 0.9 % (FLUSH) 0.9 %
2 SYRINGE (ML) INJECTION
Status: DISCONTINUED | OUTPATIENT
Start: 2023-07-10 | End: 2023-07-10 | Stop reason: HOSPADM

## 2023-07-10 NOTE — Clinical Note
Patient transported to [facility] #4 via stretcher.  Patient tolerated well.  Bedside report and assessment with Nidia Astorga RN.  Puncture site stable.  TR band in place.  VS stable.  Pulse palpable.  Patient education complete.  Family at bedside.

## 2023-07-10 NOTE — DISCHARGE SUMMARY
[facility] - Cath Lab  Discharge Note  Short Stay    Procedure(s) (LRB):  Left heart cath (N/A)      OUTCOME: Patient tolerated treatment/procedure well without complication and is now ready for discharge.    DISPOSITION: Home or Self Care    FINAL DIAGNOSIS:  Physical deconditioning    FOLLOWUP: In clinic    DISCHARGE INSTRUCTIONS:  No discharge procedures on file.     TIME SPENT ON DISCHARGE: 20 minutes

## 2023-07-10 NOTE — PLAN OF CARE
1135- 2 CC'S AIR REMOVED FROM TR BAND ON THE RIGHT WRIST. RADIAL PULSE PRESENT AND PALPABLE.  1150- 2 CC'S AIR REMOVED FROM TR BAND ON THE RIGHT WRIST. RADIAL PULSE PRESENT AND PALPABLE.  1205-2 CC'S AIR REMOVED FROM THE TR BAND ON THE RIGHT WRIST. RADIAL PULSE PRESENT AND PALPABLE.  1220- 2 CC'S AIR REMOVED FROM THE TR BAND ON THE RIGHT WRIST. RADIAL PULSE PRESENT AND PALPABLE. NO BLEEDING FROM THE RADIAL SITE.  1235- 2 CC'S AIR REMOVED FROM THE TR BAND ON THE RIGHT WRIST. RADIAL PULSE PRESENT AND PALPABLE.  WILL MONITOR SITE WITH THE BAND ON FOR BLEEDING AND HEMATOMA.

## 2023-07-10 NOTE — PLAN OF CARE
1300-TR BAND HAS BEEN REMOVED AND DRESSING GAUZE AND TEGADERM IN PLACE. WILL CONTINUE TO MONITOR FOR BLEEDING AND HEMATOMA.

## 2023-07-12 ENCOUNTER — OFFICE VISIT (OUTPATIENT)
Dept: NEPHROLOGY | Facility: CLINIC | Age: 68
End: 2023-07-12
Payer: COMMERCIAL

## 2023-07-12 VITALS
HEIGHT: 71 IN | OXYGEN SATURATION: 96 % | WEIGHT: 236.19 LBS | RESPIRATION RATE: 18 BRPM | HEART RATE: 92 BPM | DIASTOLIC BLOOD PRESSURE: 72 MMHG | SYSTOLIC BLOOD PRESSURE: 134 MMHG | TEMPERATURE: 98 F | BODY MASS INDEX: 33.06 KG/M2

## 2023-07-12 DIAGNOSIS — N18.31 STAGE 3A CHRONIC KIDNEY DISEASE: Primary | ICD-10-CM

## 2023-07-12 DIAGNOSIS — R80.9 PROTEINURIA, UNSPECIFIED TYPE: ICD-10-CM

## 2023-07-12 DIAGNOSIS — I10 ESSENTIAL HYPERTENSION: ICD-10-CM

## 2023-07-12 DIAGNOSIS — I12.9 HYPERTENSIVE NEPHROSCLEROSIS, STAGE 1 THROUGH STAGE 4 OR UNSPECIFIED CHRONIC KIDNEY DISEASE: ICD-10-CM

## 2023-07-12 PROCEDURE — 99214 PR OFFICE/OUTPT VISIT, EST, LEVL IV, 30-39 MIN: ICD-10-PCS | Mod: S$PBB,,, | Performed by: INTERNAL MEDICINE

## 2023-07-12 PROCEDURE — 99215 OFFICE O/P EST HI 40 MIN: CPT | Mod: PBBFAC | Performed by: INTERNAL MEDICINE

## 2023-07-12 PROCEDURE — 99214 OFFICE O/P EST MOD 30 MIN: CPT | Mod: S$PBB,,, | Performed by: INTERNAL MEDICINE

## 2023-07-12 RX ORDER — DAPAGLIFLOZIN 10 MG/1
10 TABLET, FILM COATED ORAL DAILY
Qty: 90 TABLET | Refills: 3 | Status: SHIPPED | OUTPATIENT
Start: 2023-07-12 | End: 2023-07-17 | Stop reason: SDUPTHER

## 2023-07-12 NOTE — PROGRESS NOTES
"[facility] History and Physical  Patient Name: Mikhail Stanford  MRN: [id number]  Age: 68 y.o.  : 1955    Date: 2023 3:01 PM    Chief Complaint: Follow Up    HPI :   Mr Stanford presents to Nephrology clinic for follow up. He is followed by Dr. Vishnu Ballesteros MD as his primary care provider.     HTN: diagnosed over 20 years. Current regimen includes valsartan-hydrochlorothiazide 160-12.5 mg daily     DLD: On statin, zetia     Hepatatis: treated in the past unsure which kind.     Nephrology history: No FH of kidney disease, no nephrolithiasis, or recurrent UTIs. No OTC medications including NSAIDs. He no longer taking aleve.  He reports very foamy frothy urine.     Patient denies any CP, SOB, peripheral edema, dysuria, hematuria, changes in urinary habits, or increased frequency of urination.     Past Medical History:  has a past medical history of COPD (chronic obstructive pulmonary disease), Hypertension, and Sleep apnea.     Past Surgical History:   has a past surgical history that includes Dental surgery; Excision of lesion of lip (N/A, 2023); Hernia repair (2020); and Left heart catheterization (N/A, 7/10/2023).     Family History:  family history includes Cancer in his mother; Diabetes in his sister; Hyperlipidemia in his sister. No family history of kidney disease.     Social History:   reports that he has quit smoking. His smoking use included cigarettes. He has a 50.00 pack-year smoking history. He has been exposed to tobacco smoke. He quit smokeless tobacco use about 17 years ago.  His smokeless tobacco use included chew. He reports that he does not currently use alcohol. He reports that he does not currently use drugs after having used the following drugs: Marijuana, "Crack" cocaine, and Heroin. Has two twin sons that are 15. He quit drugs 30 years ago. He raised his sons solo he says. He is now retired.    Allergies: has No Known " Allergies.     Medications: Reviewed including OTC medications, herbal supplements, and NSAIDS.     Old records have been reviewed.      Review of Systems:  ROS: A 10 point ROS was completed and found to be negative except for that mentioned above.          Physical Exam:  Vitals:    07/12/23 0938   BP: 134/72   Pulse: 92   Resp: 18   Temp: 97.7 °F (36.5 °C)         Constitutional: sitting in chair, in NAD  Eyes: EOMI, white sclera  ENMT: moist mucus membranes, nares patent  Cardiovascular: normal rate, S1/S2 noted, no edema  Respiratory: symmetrical chest expansion, CTA-B  Gastrointestinal: +BS, soft, NT/ND  Musculoskeletal: normal, no joint erythema/effusions  Skin: no rash, no purpura, warm extremities  Neurological: Alert and Oriented x 4, afocal    Labs:   Lab Results   Component Value Date     06/28/2023    K 4.0 06/28/2023    CREATININE 1.65 (H) 06/28/2023    ALT 40 01/31/2023    AST 23 01/31/2023    HGBA1C 6.2 04/21/2023    LDLCALC 73 01/31/2023    CHOL 157 01/31/2023    HDL 65 (H) 01/31/2023    TRIG 96 01/31/2023    WBC 5.57 06/28/2023    HGB 14.4 06/28/2023    HCT 44.0 06/28/2023     06/28/2023    PSA 0.603 01/31/2023        Otherwise Reviewed    Assessment/Plan:       CKD stage IIIa in setting of suspected hypertensive nephrosclerosis.  Baseline sCr 1.5, today sCr pending. Counseled to avoid nephrotoxic agents such as NSAIDs. Will obtain baseline renal ultrasound.    Proteinuria: urine Prot:Creat ratio is pending. Patient is on RAAS blockade with ARB. He had an abnormal UPEP. I will repeat today. Due to CKD, elevated proteinuria, I think he would benefit from Farxiga to help slow proteinuria. Will give samples and send rx.     Anemia: CBC today     BMD: Calcium and Phosphorus PTH Vit D pending    HTN: well controlled with current meds     RTC 6 months with CBC, RFP, UA, urine for Prot:creat ratio, PTH, Vit D      Alisha S. Parker, DO [facility]   07/12/2023

## 2023-07-17 ENCOUNTER — TELEPHONE (OUTPATIENT)
Dept: NEPHROLOGY | Facility: CLINIC | Age: 68
End: 2023-07-17
Payer: MEDICARE

## 2023-07-17 DIAGNOSIS — D47.2 MONOCLONAL GAMMOPATHY: Primary | ICD-10-CM

## 2023-07-17 DIAGNOSIS — N18.31 STAGE 3A CHRONIC KIDNEY DISEASE: ICD-10-CM

## 2023-07-17 RX ORDER — DAPAGLIFLOZIN 10 MG/1
10 TABLET, FILM COATED ORAL DAILY
Qty: 90 TABLET | Refills: 3 | Status: SHIPPED | OUTPATIENT
Start: 2023-07-17 | End: 2024-07-16

## 2023-07-17 NOTE — TELEPHONE ENCOUNTER
----- Message from Jocelyn Avila DO sent at 7/17/2023  1:01 PM CDT -----  I spoke to Mr Abdoulaye regarding his proteinuria lab results. He has IgG Kappa Monoclonal Gammopathy. I would like to get a renal biopsy to rule out Monoclonal Gammopathy of renal significance. Please order IR renal biopsy of kidney cortex and help schedule his appointment. Please also resend a new rx to his pharmacy [facility] for dapagliflozin (generic) 10 mg daily. TY

## 2023-07-25 ENCOUNTER — HOSPITAL ENCOUNTER (OUTPATIENT)
Dept: RADIOLOGY | Facility: HOSPITAL | Age: 68
Discharge: HOME | End: 2023-07-25
Attending: INTERNAL MEDICINE
Payer: COMMERCIAL

## 2023-07-25 VITALS
RESPIRATION RATE: 16 BRPM | DIASTOLIC BLOOD PRESSURE: 80 MMHG | HEIGHT: 71 IN | BODY MASS INDEX: 31.64 KG/M2 | WEIGHT: 226 LBS | TEMPERATURE: 98 F | OXYGEN SATURATION: 95 % | SYSTOLIC BLOOD PRESSURE: 128 MMHG | HEART RATE: 97 BPM

## 2023-07-25 DIAGNOSIS — D47.2 MONOCLONAL GAMMOPATHY: ICD-10-CM

## 2023-07-25 LAB
APTT PPP: 27.8 SECONDS (ref 25.2–37.3)
BASOPHILS # BLD AUTO: 0.01 K/UL (ref 0–0.2)
BASOPHILS NFR BLD AUTO: 0.2 % (ref 0–1)
DIFFERENTIAL METHOD BLD: ABNORMAL
EOSINOPHIL # BLD AUTO: 0.11 K/UL (ref 0–0.5)
EOSINOPHIL NFR BLD AUTO: 2.3 % (ref 1–4)
ERYTHROCYTE [DISTWIDTH] IN BLOOD BY AUTOMATED COUNT: 11.5 % (ref 11.5–14.5)
HCT VFR BLD AUTO: 42.4 % (ref 40–54)
HGB BLD-MCNC: 14 G/DL (ref 13.5–18)
IMM GRANULOCYTES # BLD AUTO: 0.01 K/UL (ref 0–0.04)
IMM GRANULOCYTES NFR BLD: 0.2 % (ref 0–0.4)
INR BLD: 0.99
LYMPHOCYTES # BLD AUTO: 1.64 K/UL (ref 1–4.8)
LYMPHOCYTES NFR BLD AUTO: 33.6 % (ref 27–41)
MCH RBC QN AUTO: 29.3 PG (ref 27–31)
MCHC RBC AUTO-ENTMCNC: 33 G/DL (ref 32–36)
MCV RBC AUTO: 88.7 FL (ref 80–96)
MONOCYTES # BLD AUTO: 0.5 K/UL (ref 0–0.8)
MONOCYTES NFR BLD AUTO: 10.2 % (ref 2–6)
MPC BLD CALC-MCNC: 9.3 FL (ref 9.4–12.4)
NEUTROPHILS # BLD AUTO: 2.61 K/UL (ref 1.8–7.7)
NEUTROPHILS NFR BLD AUTO: 53.5 % (ref 53–65)
NRBC # BLD AUTO: 0 X10E3/UL
NRBC, AUTO (.00): 0 %
PLATELET # BLD AUTO: 224 K/UL (ref 150–400)
PROTHROMBIN TIME: 13 SECONDS (ref 11.7–14.7)
RBC # BLD AUTO: 4.78 M/UL (ref 4.6–6.2)
WBC # BLD AUTO: 4.88 K/UL (ref 4.5–11)

## 2023-07-25 PROCEDURE — 88313 SPECIAL STAINS GROUP 2: CPT

## 2023-07-25 PROCEDURE — 88300 SURGICAL PATH GROSS: CPT | Mod: 26,,, | Performed by: PATHOLOGY

## 2023-07-25 PROCEDURE — 88305 TISSUE EXAM BY PATHOLOGIST: CPT

## 2023-07-25 PROCEDURE — 88348 ELECTRON MICROSCOPY DX: CPT

## 2023-07-25 PROCEDURE — 50200 US BIOPSY KIDNEY RADIOLOGIST PERFORMED: ICD-10-PCS | Mod: LT,,, | Performed by: STUDENT IN AN ORGANIZED HEALTH CARE EDUCATION/TRAINING PROGRAM

## 2023-07-25 PROCEDURE — 99153 MOD SED SAME PHYS/QHP EA: CPT

## 2023-07-25 PROCEDURE — 85025 COMPLETE CBC W/AUTO DIFF WBC: CPT | Performed by: STUDENT IN AN ORGANIZED HEALTH CARE EDUCATION/TRAINING PROGRAM

## 2023-07-25 PROCEDURE — 50200 RENAL BIOPSY PERQ: CPT | Mod: LT,,, | Performed by: STUDENT IN AN ORGANIZED HEALTH CARE EDUCATION/TRAINING PROGRAM

## 2023-07-25 PROCEDURE — 88346 IMFLUOR 1ST 1ANTB STAIN PX: CPT

## 2023-07-25 PROCEDURE — 88350 IMFLUOR EA ADDL 1ANTB STN PX: CPT

## 2023-07-25 PROCEDURE — 88300 SURGICAL PATHOLOGY: ICD-10-PCS | Mod: 26,,, | Performed by: PATHOLOGY

## 2023-07-25 PROCEDURE — 88300 SURGICAL PATH GROSS: CPT | Mod: TC,SUR | Performed by: STUDENT IN AN ORGANIZED HEALTH CARE EDUCATION/TRAINING PROGRAM

## 2023-07-25 PROCEDURE — 63600175 PHARM REV CODE 636 W HCPCS: Performed by: STUDENT IN AN ORGANIZED HEALTH CARE EDUCATION/TRAINING PROGRAM

## 2023-07-25 PROCEDURE — 85730 THROMBOPLASTIN TIME PARTIAL: CPT | Performed by: STUDENT IN AN ORGANIZED HEALTH CARE EDUCATION/TRAINING PROGRAM

## 2023-07-25 PROCEDURE — 50200 RENAL BIOPSY PERQ: CPT | Mod: TC

## 2023-07-25 PROCEDURE — 99152 MOD SED SAME PHYS/QHP 5/>YRS: CPT

## 2023-07-25 PROCEDURE — 76942 US BIOPSY KIDNEY RADIOLOGIST PERFORMED: ICD-10-PCS | Mod: 26,,, | Performed by: STUDENT IN AN ORGANIZED HEALTH CARE EDUCATION/TRAINING PROGRAM

## 2023-07-25 PROCEDURE — 76942 ECHO GUIDE FOR BIOPSY: CPT | Mod: 26,,, | Performed by: STUDENT IN AN ORGANIZED HEALTH CARE EDUCATION/TRAINING PROGRAM

## 2023-07-25 RX ORDER — FENTANYL CITRATE 50 UG/ML
INJECTION, SOLUTION INTRAMUSCULAR; INTRAVENOUS
Status: COMPLETED | OUTPATIENT
Start: 2023-07-25 | End: 2023-07-25

## 2023-07-25 RX ORDER — MIDAZOLAM HYDROCHLORIDE 1 MG/ML
INJECTION INTRAMUSCULAR; INTRAVENOUS
Status: COMPLETED | OUTPATIENT
Start: 2023-07-25 | End: 2023-07-25

## 2023-07-25 RX ADMIN — FENTANYL CITRATE 50 MCG: 50 INJECTION INTRAMUSCULAR; INTRAVENOUS at 09:07

## 2023-07-25 RX ADMIN — MIDAZOLAM HYDROCHLORIDE 2 MG: 1 INJECTION, SOLUTION INTRAMUSCULAR; INTRAVENOUS at 09:07

## 2023-07-25 NOTE — DISCHARGE INSTRUCTIONS
IF PATIENT EXPERIENCES ANY DIZZINESS,LEFT LEG PAIN,WEAKNESS,FATIGUE OR KNOT COMES UP AT DRESSING SITE, GO IMMEDIATELY TO ER.

## 2023-07-27 LAB
ESTROGEN SERPL-MCNC: NORMAL PG/ML
INSULIN SERPL-ACNC: NORMAL U[IU]/ML
LAB AP CLINICAL INFORMATION: NORMAL
LAB AP GROSS DESCRIPTION: NORMAL
LAB AP LABORATORY NOTES: NORMAL
T3RU NFR SERPL: NORMAL %

## 2023-07-31 ENCOUNTER — TELEPHONE (OUTPATIENT)
Dept: NEPHROLOGY | Facility: CLINIC | Age: 68
End: 2023-07-31
Payer: MEDICARE

## 2023-07-31 ENCOUNTER — OFFICE VISIT (OUTPATIENT)
Dept: PULMONOLOGY | Facility: CLINIC | Age: 68
End: 2023-07-31
Payer: COMMERCIAL

## 2023-07-31 ENCOUNTER — CLINICAL SUPPORT (OUTPATIENT)
Dept: PULMONOLOGY | Facility: HOSPITAL | Age: 68
End: 2023-07-31
Attending: INTERNAL MEDICINE
Payer: COMMERCIAL

## 2023-07-31 VITALS
BODY MASS INDEX: 31.64 KG/M2 | DIASTOLIC BLOOD PRESSURE: 74 MMHG | RESPIRATION RATE: 16 BRPM | HEIGHT: 71 IN | SYSTOLIC BLOOD PRESSURE: 126 MMHG | WEIGHT: 226 LBS | OXYGEN SATURATION: 95 %

## 2023-07-31 DIAGNOSIS — J44.9 CHRONIC OBSTRUCTIVE PULMONARY DISEASE, UNSPECIFIED COPD TYPE: ICD-10-CM

## 2023-07-31 DIAGNOSIS — J84.9 INTERSTITIAL LUNG DISEASE: ICD-10-CM

## 2023-07-31 PROCEDURE — 94060 PR EVAL OF BRONCHOSPASM: ICD-10-PCS | Mod: 26,,, | Performed by: INTERNAL MEDICINE

## 2023-07-31 PROCEDURE — 27100098 HC SPACER

## 2023-07-31 PROCEDURE — 99213 PR OFFICE/OUTPT VISIT, EST, LEVL III, 20-29 MIN: ICD-10-PCS | Mod: S$PBB,25,, | Performed by: INTERNAL MEDICINE

## 2023-07-31 PROCEDURE — 99214 OFFICE O/P EST MOD 30 MIN: CPT | Mod: PBBFAC,25 | Performed by: INTERNAL MEDICINE

## 2023-07-31 PROCEDURE — 94060 EVALUATION OF WHEEZING: CPT | Mod: 26,,, | Performed by: INTERNAL MEDICINE

## 2023-07-31 PROCEDURE — 94729 DIFFUSING CAPACITY: CPT

## 2023-07-31 PROCEDURE — 94060 EVALUATION OF WHEEZING: CPT

## 2023-07-31 PROCEDURE — 94729 DIFFUSING CAPACITY: CPT | Mod: 26,,, | Performed by: INTERNAL MEDICINE

## 2023-07-31 PROCEDURE — 94726 PLETHYSMOGRAPHY LUNG VOLUMES: CPT

## 2023-07-31 PROCEDURE — 99213 OFFICE O/P EST LOW 20 MIN: CPT | Mod: S$PBB,25,, | Performed by: INTERNAL MEDICINE

## 2023-07-31 PROCEDURE — 94729 PR C02/MEMBANE DIFFUSE CAPACITY: ICD-10-PCS | Mod: 26,,, | Performed by: INTERNAL MEDICINE

## 2023-07-31 PROCEDURE — 94726 PULM FUNCT TST PLETHYSMOGRAP: ICD-10-PCS | Mod: 26,,, | Performed by: INTERNAL MEDICINE

## 2023-07-31 PROCEDURE — 94726 PLETHYSMOGRAPHY LUNG VOLUMES: CPT | Mod: 26,,, | Performed by: INTERNAL MEDICINE

## 2023-07-31 RX ORDER — BRIMONIDINE TARTRATE 2 MG/ML
SOLUTION/ DROPS OPHTHALMIC
COMMUNITY
Start: 2023-07-14

## 2023-07-31 NOTE — PROCEDURES
Pulmonary function test  Forced vital capacity 2.60 L 58% predicted  FEV1 2.13 L 62% predicted  FEV1 ratio 108 82%   Decreased lung volumes  Normal DLCO  Mild restrictive ventilatory impairment normal DLCO

## 2023-07-31 NOTE — ASSESSMENT & PLAN NOTE
Patient with shortness patient with shortness of breath he is using Advair and Spiriva and rescue inhaler seems to be doing reasonably well he could not get the Trelegy so we will rewrite that will seen back in 3 months to see how responds his pulmonary functions looks pretty normal

## 2023-07-31 NOTE — PROGRESS NOTES
"Subjective:       Patient ID: Mikhail Stanford is a 68 y.o. male.    Chief Complaint: COPD    COPD  This is a chronic problem. The current episode started more than 1 month ago. The problem has been unchanged. Pertinent negatives include no abdominal pain, arthralgias, chest pain, chills, congestion, headaches or rash.     Past Medical History:   Diagnosis Date    COPD (chronic obstructive pulmonary disease)     Hypertension     Sleep apnea      Past Surgical History:   Procedure Laterality Date    DENTAL SURGERY      EXCISION OF LESION OF LIP N/A 02/21/2023    Procedure: EXCISION, LESION, LIP;  Surgeon: Kuldeep Yao MD;  Location: [facility];  Service: ENT;  Laterality: N/A;    HERNIA REPAIR  11/24/2020    LEFT HEART CATHETERIZATION N/A 7/10/2023    Procedure: Left heart cath;  Surgeon: Jeremias Simmons MD;  Location: [facility] CATH LAB;  Service: Cardiology;  Laterality: N/A;     Family History   Problem Relation Age of Onset    Cancer Mother     Diabetes Sister     Hyperlipidemia Sister      Review of patient's allergies indicates:  No Known Allergies   Social History     Tobacco Use    Smoking status: Former     Current packs/day: 2.00     Average packs/day: 2.0 packs/day for 25.0 years (50.0 ttl pk-yrs)     Types: Cigarettes     Passive exposure: Past    Smokeless tobacco: Former     Types: Chew     Quit date: 1/27/2006   Substance Use Topics    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   HENT:  Negative for congestion and ear pain.    Eyes:  Negative for redness and itching.   Cardiovascular:  Negative for chest pain and palpitations.   Musculoskeletal:  Negative for arthralgias and back pain.   Skin:  Negative for rash.   Gastrointestinal:  Negative for abdominal pain and abdominal " distention.   Neurological:  Negative for dizziness and headaches.   Hematological:  Negative for adenopathy. Does not bruise/bleed easily.   Psychiatric/Behavioral:  Negative for confusion. The patient is not nervous/anxious.        Objective:      Physical Exam   Constitutional: He is oriented to person, place, and time. He appears well-developed and well-nourished.   HENT:   Head: Normocephalic.   Nose: Nose normal.   Mouth/Throat: Oropharynx is clear and moist.   Neck: No JVD present. No thyromegaly present.   Cardiovascular: Normal rate, regular rhythm, normal heart sounds and intact distal pulses.   Pulmonary/Chest: Normal expansion, hyperinflation, symmetric chest wall expansion, effort normal and breath sounds normal.   Abdominal: Soft. Bowel sounds are normal.   Musculoskeletal:         General: Normal range of motion.      Cervical back: Normal range of motion and neck supple.   Lymphadenopathy: No supraclavicular adenopathy is present.     He has no cervical adenopathy.   Neurological: He is alert and oriented to person, place, and time. He has normal reflexes.   Skin: Skin is warm and dry.   Psychiatric: He has a normal mood and affect. His behavior is normal.     Personal Diagnostic Review  none pertinent    No flowsheet data found.      Assessment:       1. Interstitial lung disease        Outpatient Encounter Medications as of 7/31/2023   Medication Sig Dispense Refill    atorvastatin (LIPITOR) 40 MG tablet 1 tablet Orally Once a day      brimonidine 0.2% (ALPHAGAN) 0.2 % Drop       chlorhexidine (PERIDEX) 0.12 % solution 5 mLs 3 (three) times daily.      dapagliflozin propanediol (FARXIGA) 10 mg tablet Take 1 tablet (10 mg total) by mouth once daily. 90 tablet 3    esomeprazole (NEXIUM) 40 MG capsule 1 capsule.      ezetimibe (ZETIA) 10 mg tablet 1 tablet Orally Once a day for 90 DAYS      fluticasone propionate (FLONASE) 50 mcg/actuation nasal spray 2 sprays (100 mcg total) by Each Nostril route  once daily. 48 g 1    fluticasone-salmeterol diskus inhaler 250-50 mcg USE 1 INHALATION ORALLY    ONCE DAILY AT 6AM 60 each 1    SPIRIVA WITH HANDIHALER 18 mcg inhalation capsule Inhale 1 capsule into the lungs.      tiotropium bromide (SPIRIVA RESPIMAT) 2.5 mcg/actuation inhaler 2 puffs Inhalation Once a day      valsartan-hydrochlorothiazide (DIOVAN-HCT) 160-12.5 mg per tablet 1 tablet Orally Once a day      albuterol (VENTOLIN HFA) 90 mcg/actuation inhaler Inhale 2 puffs into the lungs every 6 (six) hours as needed for Wheezing. Rescue (Patient not taking: Reported on 2023) 18 g 5    [] ergocalciferol (ERGOCALCIFEROL) 50,000 unit Cap Take 1 capsule (50,000 Units total) by mouth every 7 days. for 12 doses 12 capsule 0    fluticasone-umeclidin-vilanter (TRELEGY ELLIPTA) 200-62.5-25 mcg inhaler Inhale 1 puff into the lungs once daily. (Patient not taking: Reported on 2023) 60 each 5    [DISCONTINUED] dapagliflozin propanediol (FARXIGA) 10 mg tablet Take 1 tablet (10 mg total) by mouth once daily. 90 tablet 3    [DISCONTINUED] tadalafiL (CIALIS) 10 MG tablet 1 tablet as needed Orally for 30 day(s)      [DISCONTINUED] tadalafiL (CIALIS) 20 MG Tab        No facility-administered encounter medications on file as of 2023.     No orders of the defined types were placed in this encounter.      Plan:       Problem List Items Addressed This Visit          Pulmonary    Interstitial lung disease     Patient with shortness patient with shortness of breath he is using Advair and Spiriva and rescue inhaler seems to be doing reasonably well he could not get the Trelegy so we will rewrite that will seen back in 3 months to see how responds his pulmonary functions looks pretty normal

## 2023-08-01 DIAGNOSIS — J44.9 CHRONIC OBSTRUCTIVE PULMONARY DISEASE, UNSPECIFIED COPD TYPE: ICD-10-CM

## 2023-08-01 RX ORDER — FLUTICASONE FUROATE, UMECLIDINIUM BROMIDE AND VILANTEROL TRIFENATATE 200; 62.5; 25 UG/1; UG/1; UG/1
1 POWDER RESPIRATORY (INHALATION) DAILY
Qty: 60 EACH | Refills: 5 | OUTPATIENT
Start: 2023-08-01

## 2023-08-02 RX ORDER — ALBUTEROL SULFATE 90 UG/1
2 AEROSOL, METERED RESPIRATORY (INHALATION) EVERY 6 HOURS PRN
Qty: 18 G | Refills: 5 | Status: SHIPPED | OUTPATIENT
Start: 2023-08-02 | End: 2023-08-07

## 2023-08-17 RX ORDER — ALBUTEROL SULFATE 90 UG/1
2 AEROSOL, METERED RESPIRATORY (INHALATION) EVERY 6 HOURS PRN
Qty: 54 G | Refills: 5 | Status: SHIPPED | OUTPATIENT
Start: 2023-08-17

## 2023-09-05 ENCOUNTER — TELEPHONE (OUTPATIENT)
Dept: FAMILY MEDICINE | Facility: CLINIC | Age: 68
End: 2023-09-05
Payer: COMMERCIAL

## 2023-09-05 NOTE — TELEPHONE ENCOUNTER
----- Message from Brittney Sheldon sent at 9/5/2023 10:13 AM CDT -----  Regarding: RX Refill  Patient needs:     ezetimibe (ZETIA) 10 mg tablet    Pharmacy:    [facility] - [facility] - [facility]    Phone #:       [phone]

## 2023-09-13 RX ORDER — ESOMEPRAZOLE MAGNESIUM 40 MG/1
40 CAPSULE, DELAYED RELEASE ORAL DAILY
Qty: 30 CAPSULE | Refills: 0 | Status: SHIPPED | OUTPATIENT
Start: 2023-09-13 | End: 2023-10-04 | Stop reason: SDUPTHER

## 2023-09-13 RX ORDER — ATORVASTATIN CALCIUM 40 MG/1
40 TABLET, FILM COATED ORAL NIGHTLY
Qty: 90 TABLET | Refills: 1 | OUTPATIENT
Start: 2023-09-13

## 2023-09-13 RX ORDER — EZETIMIBE 10 MG/1
TABLET ORAL
Qty: 30 TABLET | Refills: 0 | Status: SHIPPED | OUTPATIENT
Start: 2023-09-13 | End: 2023-10-04 | Stop reason: SDUPTHER

## 2023-09-13 RX ORDER — VALSARTAN AND HYDROCHLOROTHIAZIDE 160; 12.5 MG/1; MG/1
1 TABLET, FILM COATED ORAL
Qty: 90 TABLET | Refills: 1 | OUTPATIENT
Start: 2023-09-13

## 2023-09-21 ENCOUNTER — OFFICE VISIT (OUTPATIENT)
Dept: FAMILY MEDICINE | Facility: CLINIC | Age: 68
End: 2023-09-21
Payer: COMMERCIAL

## 2023-09-21 VITALS
SYSTOLIC BLOOD PRESSURE: 124 MMHG | TEMPERATURE: 98 F | RESPIRATION RATE: 18 BRPM | OXYGEN SATURATION: 96 % | WEIGHT: 232 LBS | BODY MASS INDEX: 32.48 KG/M2 | HEART RATE: 72 BPM | DIASTOLIC BLOOD PRESSURE: 88 MMHG | HEIGHT: 71 IN

## 2023-09-21 DIAGNOSIS — D47.2 GAMMOPATHY: ICD-10-CM

## 2023-09-21 DIAGNOSIS — S76.302A LEFT HAMSTRING INJURY, INITIAL ENCOUNTER: Primary | ICD-10-CM

## 2023-09-21 PROCEDURE — 99212 OFFICE O/P EST SF 10 MIN: CPT | Mod: ,,, | Performed by: FAMILY MEDICINE

## 2023-09-21 PROCEDURE — 99212 PR OFFICE/OUTPT VISIT, EST, LEVL II, 10-19 MIN: ICD-10-PCS | Mod: ,,, | Performed by: FAMILY MEDICINE

## 2023-09-21 NOTE — PROGRESS NOTES
Mikhail Stanford is a 68 y.o. male seen today for follow-up.  He was evaluated by Cardiology and had a negative catheterization he reports.  He is complaining of a 2 month history of pain involving his left hamstring after no specific injury.  We discussed a physical therapy evaluation.  Also patient has been diagnosed with gammopathy we discussed a hematology evaluation.  Patient is up-to-date on his flu vaccination and pneumonia vaccinations but I did recommend RSV and COVID booster through his local pharmacy.  Patient's lipids will be due either at the beginning of next year or at the end of this 1 and he will plan on following up in December.      Past Medical History:   Diagnosis Date    COPD (chronic obstructive pulmonary disease)     Hypertension     Sleep apnea      Family History   Problem Relation Age of Onset    Cancer Mother     Diabetes Sister     Hyperlipidemia Sister      Current Outpatient Medications on File Prior to Visit   Medication Sig Dispense Refill    albuterol (PROVENTIL HFA) 90 mcg/actuation inhaler Inhale 2 puffs into the lungs every 6 (six) hours as needed for Wheezing. Rescue 54 g 5    atorvastatin (LIPITOR) 40 MG tablet 1 tablet Orally Once a day      brimonidine 0.2% (ALPHAGAN) 0.2 % Drop       chlorhexidine (PERIDEX) 0.12 % solution 5 mLs 3 (three) times daily.      dapagliflozin propanediol (FARXIGA) 10 mg tablet Take 1 tablet (10 mg total) by mouth once daily. 90 tablet 3    esomeprazole (NEXIUM) 40 MG capsule Take 1 capsule (40 mg total) by mouth once daily. 30 capsule 0    ezetimibe (ZETIA) 10 mg tablet 1 tablet Orally Once a day 30 tablet 0    fluticasone propionate (FLONASE) 50 mcg/actuation nasal spray 2 sprays (100 mcg total) by Each Nostril route once daily. 48 g 1    fluticasone-salmeterol diskus inhaler 250-50 mcg USE 1 INHALATION ORALLY    ONCE DAILY AT 6AM 60 each 1    SPIRIVA WITH HANDIHALER 18 mcg inhalation capsule Inhale 1 capsule into the lungs.      tiotropium bromide  (SPIRIVA RESPIMAT) 2.5 mcg/actuation inhaler 2 puffs Inhalation Once a day      valsartan-hydrochlorothiazide (DIOVAN-HCT) 160-12.5 mg per tablet 1 tablet Orally Once a day       No current facility-administered medications on file prior to visit.     Immunization History   Administered Date(s) Administered    Pneumococcal Conjugate - 20 Valent 01/30/2023       Review of Systems   Constitutional:  Negative for fever, malaise/fatigue and weight loss.   Respiratory:  Negative for shortness of breath.    Cardiovascular:  Negative for chest pain and palpitations.   Gastrointestinal:  Negative for nausea and vomiting.   Musculoskeletal:  Positive for myalgias.   Psychiatric/Behavioral:  Negative for depression.         Vitals:    09/21/23 0948   BP: 124/88   Pulse: 72   Resp: 18   Temp: 98.3 °F (36.8 °C)       Physical Exam  Vitals reviewed.   Constitutional:       Appearance: Normal appearance.   HENT:      Head: Normocephalic.   Eyes:      Extraocular Movements: Extraocular movements intact.      Conjunctiva/sclera: Conjunctivae normal.      Pupils: Pupils are equal, round, and reactive to light.   Neck:      Thyroid: No thyroid mass or thyromegaly.   Cardiovascular:      Rate and Rhythm: Normal rate and regular rhythm.      Heart sounds: Normal heart sounds. No murmur heard.     No gallop.   Pulmonary:      Effort: Pulmonary effort is normal. No respiratory distress.      Breath sounds: Normal breath sounds. No wheezing or rales.   Musculoskeletal:      Left upper leg: Tenderness present. No swelling.        Legs:    Skin:     General: Skin is warm and dry.      Coloration: Skin is not jaundiced or pale.   Neurological:      Mental Status: He is alert.   Psychiatric:         Mood and Affect: Mood normal.         Behavior: Behavior normal.         Thought Content: Thought content normal.         Judgment: Judgment normal.          Assessment and Plan  Left hamstring injury, initial encounter  -     Ambulatory  referral/consult to Physical/Occupational Therapy; Future; Expected date: 09/28/2023    Gammopathy  -     Ambulatory referral/consult to Hematology / Oncology; Future; Expected date: 09/28/2023            Return to clinic in December for fasting lab work and an office visit or as needed    Health Maintenance Topics with due status: Not Due       Topic Last Completion Date    Lipid Panel 01/31/2023    Hemoglobin A1c (Diabetic Prevention Screening) 04/21/2023

## 2023-10-03 DIAGNOSIS — E78.5 HYPERLIPIDEMIA, UNSPECIFIED HYPERLIPIDEMIA TYPE: Primary | ICD-10-CM

## 2023-10-03 DIAGNOSIS — I10 HYPERTENSION, UNSPECIFIED TYPE: ICD-10-CM

## 2023-10-03 DIAGNOSIS — K21.9 GASTROESOPHAGEAL REFLUX DISEASE, UNSPECIFIED WHETHER ESOPHAGITIS PRESENT: ICD-10-CM

## 2023-10-03 RX ORDER — ESOMEPRAZOLE MAGNESIUM 40 MG/1
40 CAPSULE, DELAYED RELEASE ORAL
Qty: 21 CAPSULE | Refills: 1 | OUTPATIENT
Start: 2023-10-03

## 2023-10-03 RX ORDER — EZETIMIBE 10 MG/1
TABLET ORAL
Qty: 21 TABLET | Refills: 1 | OUTPATIENT
Start: 2023-10-03

## 2023-10-04 RX ORDER — EZETIMIBE 10 MG/1
TABLET ORAL
Qty: 90 TABLET | Refills: 0 | Status: SHIPPED | OUTPATIENT
Start: 2023-10-04 | End: 2023-12-09 | Stop reason: SDUPTHER

## 2023-10-04 RX ORDER — ESOMEPRAZOLE MAGNESIUM 40 MG/1
40 CAPSULE, DELAYED RELEASE ORAL DAILY
Qty: 90 CAPSULE | Refills: 0 | Status: SHIPPED | OUTPATIENT
Start: 2023-10-04 | End: 2023-12-09 | Stop reason: SDUPTHER

## 2023-10-04 RX ORDER — VALSARTAN AND HYDROCHLOROTHIAZIDE 160; 12.5 MG/1; MG/1
TABLET, FILM COATED ORAL
Qty: 90 TABLET | Refills: 0 | Status: SHIPPED | OUTPATIENT
Start: 2023-10-04 | End: 2023-12-21 | Stop reason: SDUPTHER

## 2023-10-04 RX ORDER — ATORVASTATIN CALCIUM 40 MG/1
TABLET, FILM COATED ORAL
Qty: 90 TABLET | Refills: 0 | Status: SHIPPED | OUTPATIENT
Start: 2023-10-04 | End: 2023-12-09 | Stop reason: SDUPTHER

## 2023-10-04 NOTE — TELEPHONE ENCOUNTER
----- Message from Rita Villagomez sent at 10/4/2023 10:47 AM CDT -----  Regarding: Med Refill  Contact: Patient  Pt needs: a 90 day supply of each of the following:  atorvastatin (LIPITOR) 40 MG tablet,   valsartan-hydrochlorothiazide (DIOVAN-HCT) 160-12.5 mg per tablet,  ezetimibe (ZETIA) 10 mg tablet,  esomeprazole (NEXIUM) 40 MG capsule          Pharmacy: [facility] - [place] [facility] - [facility] AT Portal to Registered [facility] Sites    Phone #:  [phone] (M)

## 2023-10-10 RX ORDER — TIOTROPIUM BROMIDE 18 UG/1
CAPSULE ORAL; RESPIRATORY (INHALATION)
Qty: 90 CAPSULE | Refills: 1 | Status: SHIPPED | OUTPATIENT
Start: 2023-10-10 | End: 2023-12-21 | Stop reason: SDUPTHER

## 2023-11-17 DIAGNOSIS — R05.9 COUGH: ICD-10-CM

## 2023-11-17 RX ORDER — FLUTICASONE PROPIONATE AND SALMETEROL 250; 50 UG/1; UG/1
POWDER RESPIRATORY (INHALATION)
Qty: 60 EACH | Refills: 3 | Status: SHIPPED | OUTPATIENT
Start: 2023-11-17 | End: 2024-03-18

## 2023-11-17 NOTE — TELEPHONE ENCOUNTER
----- Message from Maya Clark MA sent at 11/17/2023  9:23 AM CST -----  [facility]

## 2023-12-07 DIAGNOSIS — K21.9 GASTROESOPHAGEAL REFLUX DISEASE, UNSPECIFIED WHETHER ESOPHAGITIS PRESENT: ICD-10-CM

## 2023-12-07 DIAGNOSIS — E78.5 HYPERLIPIDEMIA, UNSPECIFIED HYPERLIPIDEMIA TYPE: ICD-10-CM

## 2023-12-09 RX ORDER — EZETIMIBE 10 MG/1
TABLET ORAL
Qty: 90 TABLET | Refills: 0 | Status: SHIPPED | OUTPATIENT
Start: 2023-12-09 | End: 2024-03-05 | Stop reason: SDUPTHER

## 2023-12-09 RX ORDER — ESOMEPRAZOLE MAGNESIUM 40 MG/1
40 CAPSULE, DELAYED RELEASE ORAL
Qty: 90 CAPSULE | Refills: 0 | Status: SHIPPED | OUTPATIENT
Start: 2023-12-09 | End: 2024-03-11

## 2023-12-09 RX ORDER — ATORVASTATIN CALCIUM 40 MG/1
TABLET, FILM COATED ORAL
Qty: 90 TABLET | Refills: 0 | Status: SHIPPED | OUTPATIENT
Start: 2023-12-09 | End: 2023-12-21 | Stop reason: SDUPTHER

## 2023-12-19 DIAGNOSIS — J30.9 ALLERGIC RHINITIS, UNSPECIFIED SEASONALITY, UNSPECIFIED TRIGGER: ICD-10-CM

## 2023-12-19 RX ORDER — FLUTICASONE PROPIONATE 50 MCG
2 SPRAY, SUSPENSION (ML) NASAL DAILY
Qty: 48 G | Refills: 1 | Status: SHIPPED | OUTPATIENT
Start: 2023-12-19 | End: 2023-12-21 | Stop reason: SDUPTHER

## 2023-12-19 NOTE — TELEPHONE ENCOUNTER
----- Message from Rita Villagomez sent at 12/15/2023 11:50 AM CST -----  Regarding: Med Refill  Contact: Patient  Pt needs: fluticasone propionate (FLONASE) 50 mcg/actuation nasal spray    Pharmacy: [facility] - [place] - [facility] AT Portal to Registered [facility] Sites    Phone #: [phone]

## 2023-12-20 DIAGNOSIS — J44.9 CHRONIC OBSTRUCTIVE PULMONARY DISEASE, UNSPECIFIED COPD TYPE: Primary | ICD-10-CM

## 2023-12-20 RX ORDER — BUDESONIDE AND FORMOTEROL FUMARATE DIHYDRATE 160; 4.5 UG/1; UG/1
2 AEROSOL RESPIRATORY (INHALATION) EVERY 12 HOURS
COMMUNITY
End: 2023-12-20 | Stop reason: SDUPTHER

## 2023-12-20 RX ORDER — BUDESONIDE AND FORMOTEROL FUMARATE DIHYDRATE 160; 4.5 UG/1; UG/1
2 AEROSOL RESPIRATORY (INHALATION) EVERY 12 HOURS
Qty: 10.2 G | Refills: 3 | Status: SHIPPED | OUTPATIENT
Start: 2023-12-20

## 2023-12-21 ENCOUNTER — OFFICE VISIT (OUTPATIENT)
Dept: FAMILY MEDICINE | Facility: CLINIC | Age: 68
End: 2023-12-21
Payer: COMMERCIAL

## 2023-12-21 VITALS
DIASTOLIC BLOOD PRESSURE: 84 MMHG | OXYGEN SATURATION: 96 % | HEART RATE: 76 BPM | WEIGHT: 232 LBS | SYSTOLIC BLOOD PRESSURE: 130 MMHG | BODY MASS INDEX: 32.48 KG/M2 | RESPIRATION RATE: 18 BRPM | HEIGHT: 71 IN

## 2023-12-21 DIAGNOSIS — J30.9 ALLERGIC RHINITIS, UNSPECIFIED SEASONALITY, UNSPECIFIED TRIGGER: ICD-10-CM

## 2023-12-21 DIAGNOSIS — I10 HYPERTENSION, UNSPECIFIED TYPE: ICD-10-CM

## 2023-12-21 DIAGNOSIS — Z23 NEED FOR IMMUNIZATION AGAINST INFLUENZA: ICD-10-CM

## 2023-12-21 DIAGNOSIS — E78.5 HYPERLIPIDEMIA, UNSPECIFIED HYPERLIPIDEMIA TYPE: ICD-10-CM

## 2023-12-21 DIAGNOSIS — J44.9 CHRONIC OBSTRUCTIVE PULMONARY DISEASE, UNSPECIFIED COPD TYPE: Primary | ICD-10-CM

## 2023-12-21 PROCEDURE — 90694 FLU VACCINE - QUADRIVALENT - ADJUVANTED: ICD-10-PCS | Mod: ,,, | Performed by: FAMILY MEDICINE

## 2023-12-21 PROCEDURE — 99214 PR OFFICE/OUTPT VISIT, EST, LEVL IV, 30-39 MIN: ICD-10-PCS | Mod: ,,, | Performed by: FAMILY MEDICINE

## 2023-12-21 PROCEDURE — G0008 FLU VACCINE - QUADRIVALENT - ADJUVANTED: ICD-10-PCS | Mod: ,,, | Performed by: FAMILY MEDICINE

## 2023-12-21 PROCEDURE — 99214 OFFICE O/P EST MOD 30 MIN: CPT | Mod: ,,, | Performed by: FAMILY MEDICINE

## 2023-12-21 PROCEDURE — 90694 VACC AIIV4 NO PRSRV 0.5ML IM: CPT | Mod: ,,, | Performed by: FAMILY MEDICINE

## 2023-12-21 PROCEDURE — G0008 ADMIN INFLUENZA VIRUS VAC: HCPCS | Mod: ,,, | Performed by: FAMILY MEDICINE

## 2023-12-21 RX ORDER — TIOTROPIUM BROMIDE 18 UG/1
CAPSULE ORAL; RESPIRATORY (INHALATION)
Qty: 90 CAPSULE | Refills: 1 | Status: CANCELLED | OUTPATIENT
Start: 2023-12-21

## 2023-12-21 RX ORDER — VALSARTAN AND HYDROCHLOROTHIAZIDE 160; 12.5 MG/1; MG/1
TABLET, FILM COATED ORAL
Qty: 90 TABLET | Refills: 0 | Status: SHIPPED | OUTPATIENT
Start: 2023-12-21 | End: 2024-03-05 | Stop reason: SDUPTHER

## 2023-12-21 RX ORDER — ATORVASTATIN CALCIUM 40 MG/1
TABLET, FILM COATED ORAL
Qty: 90 TABLET | Refills: 0 | Status: SHIPPED | OUTPATIENT
Start: 2023-12-21

## 2023-12-21 RX ORDER — FLUTICASONE PROPIONATE 50 MCG
2 SPRAY, SUSPENSION (ML) NASAL DAILY
Qty: 48 G | Refills: 1 | Status: SHIPPED | OUTPATIENT
Start: 2023-12-21

## 2023-12-21 RX ORDER — TADALAFIL 20 MG/1
TABLET ORAL
COMMUNITY
End: 2024-03-13

## 2023-12-21 RX ORDER — TIOTROPIUM BROMIDE 18 UG/1
CAPSULE ORAL; RESPIRATORY (INHALATION)
Qty: 90 CAPSULE | Refills: 1 | Status: SHIPPED | OUTPATIENT
Start: 2023-12-21

## 2023-12-21 NOTE — PROGRESS NOTES
Mikhail Stanford is a 68 y.o. male seen today for follow-up for COPD and hypertension.  Patient's blood pressure is well controlled denies any chest pain shortness for breath.  Patient's Advair so longer available and we have switched the patient to Symbicort.    Past Medical History:   Diagnosis Date    COPD (chronic obstructive pulmonary disease)     Hypertension     Sleep apnea      Family History   Problem Relation Age of Onset    Cancer Mother     Diabetes Sister     Hyperlipidemia Sister      Current Outpatient Medications on File Prior to Visit   Medication Sig Dispense Refill    albuterol (PROVENTIL HFA) 90 mcg/actuation inhaler Inhale 2 puffs into the lungs every 6 (six) hours as needed for Wheezing. Rescue 54 g 5    brimonidine 0.2% (ALPHAGAN) 0.2 % Drop       budesonide-formoterol 160-4.5 mcg (SYMBICORT) 160-4.5 mcg/actuation HFAA Inhale 2 puffs into the lungs every 12 (twelve) hours. 10.2 g 3    chlorhexidine (PERIDEX) 0.12 % solution 5 mLs 3 (three) times daily.      dapagliflozin propanediol (FARXIGA) 10 mg tablet Take 1 tablet (10 mg total) by mouth once daily. 90 tablet 3    esomeprazole (NEXIUM) 40 MG capsule TAKE 1 CAPSULE BY MOUTH EVERY DAY 90 capsule 0    ezetimibe (ZETIA) 10 mg tablet TAKE 1 TABLET BY MOUTH EVERY DAY 90 tablet 0    fluticasone-salmeterol diskus inhaler 250-50 mcg USE 1 INHALATION ORALLY    ONCE DAILY AT 6AM 60 each 3    tadalafiL (CIALIS) 20 MG Tab Take by mouth.      [DISCONTINUED] atorvastatin (LIPITOR) 40 MG tablet TAKE 1 TABLET BY MOUTH EVERY DAY 90 tablet 0    [DISCONTINUED] fluticasone propionate (FLONASE) 50 mcg/actuation nasal spray 2 sprays (100 mcg total) by Each Nostril route once daily. 48 g 1    [DISCONTINUED] SPIRIVA WITH HANDIHALER 18 mcg inhalation capsule INHALE THE CONTENTS OF 1   CAPSULE (VIA 2 INHALATIONS)DAILY. 90 capsule 1    [DISCONTINUED] valsartan-hydrochlorothiazide (DIOVAN-HCT) 160-12.5 mg per tablet 1 tablet Orally Once a day 90 tablet 0     No current  facility-administered medications on file prior to visit.     Immunization History   Administered Date(s) Administered    Influenza (FLUAD) - Quadrivalent - Adjuvanted - PF *Preferred* (65+) 12/21/2023    Pneumococcal Conjugate - 20 Valent 01/30/2023       Review of Systems   Constitutional:  Negative for fever, malaise/fatigue and weight loss.   Respiratory:  Negative for shortness of breath.    Cardiovascular:  Negative for chest pain and palpitations.   Gastrointestinal:  Negative for nausea and vomiting.   Psychiatric/Behavioral:  Negative for depression.         Vitals:    12/21/23 0950   BP: 130/84   Pulse: 76   Resp: 18       Physical Exam  Vitals reviewed.   Constitutional:       Appearance: Normal appearance.   HENT:      Head: Normocephalic.   Eyes:      Extraocular Movements: Extraocular movements intact.      Conjunctiva/sclera: Conjunctivae normal.      Pupils: Pupils are equal, round, and reactive to light.   Neck:      Thyroid: No thyroid mass or thyromegaly.   Cardiovascular:      Rate and Rhythm: Normal rate and regular rhythm.      Heart sounds: Normal heart sounds. No murmur heard.     No gallop.   Pulmonary:      Effort: Pulmonary effort is normal. No respiratory distress.      Breath sounds: Normal breath sounds. No wheezing or rales.   Skin:     General: Skin is warm and dry.      Coloration: Skin is not jaundiced or pale.   Neurological:      Mental Status: He is alert.   Psychiatric:         Mood and Affect: Mood normal.         Behavior: Behavior normal.         Thought Content: Thought content normal.         Judgment: Judgment normal.          Assessment and Plan  1. Chronic obstructive pulmonary disease, unspecified COPD type  -     tiotropium (SPIRIVA WITH HANDIHALER) 18 mcg inhalation capsule; INHALE THE CONTENTS OF 1   CAPSULE (VIA 2 INHALATIONS)DAILY.  Dispense: 90 capsule; Refill: 1    2. Hypertension, unspecified type  -     CBC Auto Differential; Future; Expected date:  01/04/2024  -     Comprehensive Metabolic Panel; Future; Expected date: 01/04/2024  -     valsartan-hydrochlorothiazide (DIOVAN-HCT) 160-12.5 mg per tablet; 1 tablet Orally Once a day  Dispense: 90 tablet; Refill: 0  -     Lipid Panel; Future; Expected date: 01/04/2024    3. Allergic rhinitis, unspecified seasonality, unspecified trigger  -     fluticasone propionate (FLONASE) 50 mcg/actuation nasal spray; 2 sprays (100 mcg total) by Each Nostril route once daily.  Dispense: 48 g; Refill: 1    4. Hyperlipidemia, unspecified hyperlipidemia type  -     atorvastatin (LIPITOR) 40 MG tablet; TAKE 1 TABLET BY MOUTH EVERY DAY  Dispense: 90 tablet; Refill: 0    5. Need for immunization against influenza  -     Influenza (FLUAD) - Quadrivalent (Adjuvanted) *Preferred* (65+) (PF)             Return to clinic in 6 months or as needed.    Health Maintenance Topics with due status: Not Due       Topic Last Completion Date    Lipid Panel 01/31/2023    Hemoglobin A1c (Diabetic Prevention Screening) 04/21/2023

## 2024-03-05 ENCOUNTER — OFFICE VISIT (OUTPATIENT)
Dept: FAMILY MEDICINE | Facility: CLINIC | Age: 69
End: 2024-03-05
Payer: COMMERCIAL

## 2024-03-05 VITALS
SYSTOLIC BLOOD PRESSURE: 116 MMHG | BODY MASS INDEX: 31.5 KG/M2 | TEMPERATURE: 98 F | RESPIRATION RATE: 20 BRPM | DIASTOLIC BLOOD PRESSURE: 72 MMHG | OXYGEN SATURATION: 95 % | HEART RATE: 84 BPM | HEIGHT: 71 IN | WEIGHT: 225 LBS

## 2024-03-05 DIAGNOSIS — I10 HYPERTENSION, UNSPECIFIED TYPE: ICD-10-CM

## 2024-03-05 DIAGNOSIS — E78.5 HYPERLIPIDEMIA, UNSPECIFIED HYPERLIPIDEMIA TYPE: ICD-10-CM

## 2024-03-05 DIAGNOSIS — Z12.11 COLON CANCER SCREENING: ICD-10-CM

## 2024-03-05 DIAGNOSIS — Z13.6 SCREENING FOR ABDOMINAL AORTIC ANEURYSM: Primary | ICD-10-CM

## 2024-03-05 DIAGNOSIS — R53.83 FATIGUE, UNSPECIFIED TYPE: ICD-10-CM

## 2024-03-05 DIAGNOSIS — N18.31 STAGE 3A CHRONIC KIDNEY DISEASE: ICD-10-CM

## 2024-03-05 DIAGNOSIS — J44.9 CHRONIC OBSTRUCTIVE PULMONARY DISEASE, UNSPECIFIED COPD TYPE: ICD-10-CM

## 2024-03-05 PROCEDURE — 99214 OFFICE O/P EST MOD 30 MIN: CPT | Mod: ,,, | Performed by: FAMILY MEDICINE

## 2024-03-05 RX ORDER — EZETIMIBE 10 MG/1
TABLET ORAL
Qty: 90 TABLET | Refills: 0 | Status: SHIPPED | OUTPATIENT
Start: 2024-03-05 | End: 2024-05-21

## 2024-03-05 RX ORDER — VALSARTAN AND HYDROCHLOROTHIAZIDE 160; 12.5 MG/1; MG/1
TABLET, FILM COATED ORAL
Qty: 90 TABLET | Refills: 0 | Status: SHIPPED | OUTPATIENT
Start: 2024-03-05 | End: 2024-05-23 | Stop reason: SDUPTHER

## 2024-03-05 NOTE — PROGRESS NOTES
Mikhail Stanford is a 69 y.o. male seen today for follow-up on his hypertension.  Patient's blood pressures remain well controlled and he reports the Symbicort is helping with his COPD.  Patient is due for colon cancer screening and this will be ordered today.  Patient reports increased symptoms of fatigue and sleepiness and I recommended a follow-up with his sleep specialist.  He is not fasting today but will come in for fasting lab work and I will add thyroid studies to these.      Past Medical History:   Diagnosis Date    COPD (chronic obstructive pulmonary disease)     Hypertension     Sleep apnea      Family History   Problem Relation Age of Onset    Cancer Mother     Diabetes Sister     Hyperlipidemia Sister      Current Outpatient Medications on File Prior to Visit   Medication Sig Dispense Refill    albuterol (PROVENTIL HFA) 90 mcg/actuation inhaler Inhale 2 puffs into the lungs every 6 (six) hours as needed for Wheezing. Rescue 54 g 5    atorvastatin (LIPITOR) 40 MG tablet TAKE 1 TABLET BY MOUTH EVERY DAY 90 tablet 0    brimonidine 0.2% (ALPHAGAN) 0.2 % Drop       budesonide-formoterol 160-4.5 mcg (SYMBICORT) 160-4.5 mcg/actuation HFAA Inhale 2 puffs into the lungs every 12 (twelve) hours. 10.2 g 3    chlorhexidine (PERIDEX) 0.12 % solution 5 mLs 3 (three) times daily.      dapagliflozin propanediol (FARXIGA) 10 mg tablet Take 1 tablet (10 mg total) by mouth once daily. 90 tablet 3    esomeprazole (NEXIUM) 40 MG capsule TAKE 1 CAPSULE BY MOUTH EVERY DAY 90 capsule 0    fluticasone propionate (FLONASE) 50 mcg/actuation nasal spray 2 sprays (100 mcg total) by Each Nostril route once daily. 48 g 1    tadalafiL (CIALIS) 20 MG Tab Take by mouth.      tiotropium (SPIRIVA WITH HANDIHALER) 18 mcg inhalation capsule INHALE THE CONTENTS OF 1   CAPSULE (VIA 2 INHALATIONS)DAILY. 90 capsule 1    [DISCONTINUED] ezetimibe (ZETIA) 10 mg tablet TAKE 1 TABLET BY MOUTH EVERY DAY 90 tablet 0    [DISCONTINUED]  valsartan-hydrochlorothiazide (DIOVAN-HCT) 160-12.5 mg per tablet 1 tablet Orally Once a day 90 tablet 0    fluticasone-salmeterol diskus inhaler 250-50 mcg USE 1 INHALATION ORALLY    ONCE DAILY AT 6AM (Patient not taking: Reported on 3/5/2024) 60 each 3     No current facility-administered medications on file prior to visit.     Immunization History   Administered Date(s) Administered    Influenza (FLUAD) - Quadrivalent - Adjuvanted - PF *Preferred* (65+) 12/21/2023    Pneumococcal Conjugate - 20 Valent 01/30/2023       Review of Systems   Constitutional:  Positive for malaise/fatigue. Negative for fever and weight loss.   Respiratory:  Positive for shortness of breath.    Cardiovascular:  Negative for chest pain and palpitations.   Gastrointestinal:  Negative for nausea and vomiting.   Psychiatric/Behavioral:  Negative for depression.         Vitals:    03/05/24 1028   BP: 116/72   Pulse: 84   Resp: 20   Temp: 98.1 °F (36.7 °C)       Physical Exam  Vitals reviewed.   Constitutional:       Appearance: Normal appearance.   HENT:      Head: Normocephalic.   Eyes:      Extraocular Movements: Extraocular movements intact.      Conjunctiva/sclera: Conjunctivae normal.      Pupils: Pupils are equal, round, and reactive to light.   Neck:      Thyroid: No thyroid mass or thyromegaly.   Cardiovascular:      Rate and Rhythm: Normal rate and regular rhythm.      Heart sounds: Normal heart sounds. No murmur heard.     No gallop.   Pulmonary:      Effort: Pulmonary effort is normal. No respiratory distress.      Breath sounds: Decreased breath sounds present. No wheezing or rales.   Skin:     General: Skin is warm and dry.      Coloration: Skin is not jaundiced or pale.   Neurological:      Mental Status: He is alert.   Psychiatric:         Mood and Affect: Mood normal.         Behavior: Behavior normal.         Thought Content: Thought content normal.         Judgment: Judgment normal.          Assessment and Plan  1.  Screening for abdominal aortic aneurysm  -     US AAA Screening; Future; Expected date: 03/05/2024    2. Hypertension, unspecified type  -     valsartan-hydrochlorothiazide (DIOVAN-HCT) 160-12.5 mg per tablet; 1 tablet Orally Once a day  Dispense: 90 tablet; Refill: 0  -     CBC Auto Differential; Future; Expected date: 03/05/2024  -     Comprehensive Metabolic Panel; Future; Expected date: 03/05/2024    3. Hyperlipidemia, unspecified hyperlipidemia type  -     ezetimibe (ZETIA) 10 mg tablet; TAKE 1 TABLET BY MOUTH EVERY DAY  Dispense: 90 tablet; Refill: 0  -     Lipid Panel; Future; Expected date: 03/05/2024    4. Chronic obstructive pulmonary disease, unspecified COPD type  Assessment & Plan:  We will continue current treatment plan and follow-up for exacerbations.      5. Stage 3a chronic kidney disease  Assessment & Plan:  We will continue to monitor his GFR and encouraged avoidance of NSAIDs.               Return to clinic as needed once his lab work is in.    Health Maintenance Topics with due status: Not Due       Topic Last Completion Date    Lipid Panel 01/31/2023    Hemoglobin A1c (Diabetic Prevention Screening) 04/21/2023

## 2024-03-08 ENCOUNTER — TELEPHONE (OUTPATIENT)
Dept: FAMILY MEDICINE | Facility: CLINIC | Age: 69
End: 2024-03-08
Payer: COMMERCIAL

## 2024-03-08 NOTE — TELEPHONE ENCOUNTER
----- Message from Vishnu Ballesteros MD sent at 3/8/2024  8:06 AM CST -----  Office visit for renal function and CO2

## 2024-03-08 NOTE — TELEPHONE ENCOUNTER
Pt notified of results and recommended office visit to follow up. Verbalized understanding. Transferred to  to schedule.

## 2024-03-09 DIAGNOSIS — K21.9 GASTROESOPHAGEAL REFLUX DISEASE, UNSPECIFIED WHETHER ESOPHAGITIS PRESENT: ICD-10-CM

## 2024-03-11 RX ORDER — ESOMEPRAZOLE MAGNESIUM 40 MG/1
40 CAPSULE, DELAYED RELEASE ORAL
Qty: 90 CAPSULE | Refills: 1 | Status: SHIPPED | OUTPATIENT
Start: 2024-03-11

## 2024-03-12 ENCOUNTER — TELEPHONE (OUTPATIENT)
Dept: FAMILY MEDICINE | Facility: CLINIC | Age: 69
End: 2024-03-12
Payer: COMMERCIAL

## 2024-03-12 ENCOUNTER — HOSPITAL ENCOUNTER (OUTPATIENT)
Dept: RADIOLOGY | Facility: HOSPITAL | Age: 69
Discharge: HOME | End: 2024-03-12
Attending: FAMILY MEDICINE
Payer: COMMERCIAL

## 2024-03-12 DIAGNOSIS — Z13.6 SCREENING FOR ABDOMINAL AORTIC ANEURYSM: ICD-10-CM

## 2024-03-12 PROCEDURE — 76706 US ABDL AORTA SCREEN AAA: CPT | Mod: 26,,, | Performed by: STUDENT IN AN ORGANIZED HEALTH CARE EDUCATION/TRAINING PROGRAM

## 2024-03-12 PROCEDURE — 76706 US ABDL AORTA SCREEN AAA: CPT | Mod: TC

## 2024-03-12 NOTE — TELEPHONE ENCOUNTER
----- Message from Vishnu Ballesteros MD sent at 3/12/2024 10:39 AM CDT -----  Negative for aneurysm

## 2024-03-12 NOTE — TELEPHONE ENCOUNTER
Caryn Abdoulaye notified and voiced understanding that patients AAA screen was negative for aneurysm.

## 2024-03-13 RX ORDER — TADALAFIL 20 MG/1
20 TABLET ORAL DAILY PRN
Qty: 24 TABLET | Refills: 3 | Status: SHIPPED | OUTPATIENT
Start: 2024-03-13

## 2024-03-18 ENCOUNTER — OFFICE VISIT (OUTPATIENT)
Dept: FAMILY MEDICINE | Facility: CLINIC | Age: 69
End: 2024-03-18
Payer: COMMERCIAL

## 2024-03-18 VITALS
RESPIRATION RATE: 18 BRPM | DIASTOLIC BLOOD PRESSURE: 80 MMHG | TEMPERATURE: 98 F | SYSTOLIC BLOOD PRESSURE: 128 MMHG | OXYGEN SATURATION: 98 % | HEART RATE: 98 BPM | WEIGHT: 225 LBS | BODY MASS INDEX: 31.5 KG/M2 | HEIGHT: 71 IN

## 2024-03-18 DIAGNOSIS — J44.9 CHRONIC OBSTRUCTIVE PULMONARY DISEASE, UNSPECIFIED COPD TYPE: Primary | ICD-10-CM

## 2024-03-18 DIAGNOSIS — N28.9 RENAL INSUFFICIENCY: ICD-10-CM

## 2024-03-18 PROCEDURE — 99212 OFFICE O/P EST SF 10 MIN: CPT | Mod: ,,, | Performed by: FAMILY MEDICINE

## 2024-03-18 NOTE — PROGRESS NOTES
Mikhail Stanford is a 69 y.o. male seen today for follow-up on recent lab work.  Unfortunately patient's GFR was down to 44 and I have encouraged him to follow-up with his nephrologist.  Patient reports he recently cancelled this appointment but we will reschedule.  Patient currently is on Symbicort and Spiriva from maintenance of his COPD.  Patient reports he has had increased symptoms of shortness a breath especially with exertion lately.  I also noted that his CO2 is elevated on his lab work and I have asked him to follow-up with his sleep specialist as well.  We discussed a trial of Breztri and Air Supra.  We reviewed the proper use of these medications and inform the patient to make sure that he rinses his mouth after use.  We will plan on follow-up in 2 weeks to see if this regimen is improving his symptoms.    Past Medical History:   Diagnosis Date    COPD (chronic obstructive pulmonary disease)     Hypertension     Sleep apnea      Family History   Problem Relation Age of Onset    Cancer Mother     Diabetes Sister     Hyperlipidemia Sister      Current Outpatient Medications on File Prior to Visit   Medication Sig Dispense Refill    albuterol (PROVENTIL HFA) 90 mcg/actuation inhaler Inhale 2 puffs into the lungs every 6 (six) hours as needed for Wheezing. Rescue 54 g 5    atorvastatin (LIPITOR) 40 MG tablet TAKE 1 TABLET BY MOUTH EVERY DAY 90 tablet 0    brimonidine 0.2% (ALPHAGAN) 0.2 % Drop       budesonide-formoterol 160-4.5 mcg (SYMBICORT) 160-4.5 mcg/actuation HFAA Inhale 2 puffs into the lungs every 12 (twelve) hours. 10.2 g 3    chlorhexidine (PERIDEX) 0.12 % solution 5 mLs 3 (three) times daily.      dapagliflozin propanediol (FARXIGA) 10 mg tablet Take 1 tablet (10 mg total) by mouth once daily. 90 tablet 3    esomeprazole (NEXIUM) 40 MG capsule TAKE 1 CAPSULE BY MOUTH EVERY DAY 90 capsule 1    ezetimibe (ZETIA) 10 mg tablet TAKE 1 TABLET BY MOUTH EVERY DAY 90 tablet 0    fluticasone propionate  (FLONASE) 50 mcg/actuation nasal spray 2 sprays (100 mcg total) by Each Nostril route once daily. 48 g 1    tadalafiL (CIALIS) 20 MG Tab Take 1 tablet (20 mg total) by mouth daily as needed (intercourse). 24 tablet 3    tiotropium (SPIRIVA WITH HANDIHALER) 18 mcg inhalation capsule INHALE THE CONTENTS OF 1   CAPSULE (VIA 2 INHALATIONS)DAILY. 90 capsule 1    valsartan-hydrochlorothiazide (DIOVAN-HCT) 160-12.5 mg per tablet 1 tablet Orally Once a day 90 tablet 0    [DISCONTINUED] fluticasone-salmeterol diskus inhaler 250-50 mcg USE 1 INHALATION ORALLY    ONCE DAILY AT 6AM (Patient not taking: Reported on 3/5/2024) 60 each 3     No current facility-administered medications on file prior to visit.     Immunization History   Administered Date(s) Administered    Influenza (FLUAD) - Quadrivalent - Adjuvanted - PF *Preferred* (65+) 12/21/2023    Pneumococcal Conjugate - 20 Valent 01/30/2023       Review of Systems   Constitutional:  Positive for malaise/fatigue. Negative for fever and weight loss.   Respiratory:  Positive for shortness of breath.    Cardiovascular:  Negative for chest pain and palpitations.   Gastrointestinal:  Negative for nausea and vomiting.   Psychiatric/Behavioral:  Negative for depression.         Vitals:    03/18/24 1545   BP: 128/80   Pulse: 98   Resp: 18   Temp: 97.8 °F (36.6 °C)       Physical Exam  Vitals reviewed.   Constitutional:       Appearance: Normal appearance.   HENT:      Head: Normocephalic.   Eyes:      Extraocular Movements: Extraocular movements intact.      Conjunctiva/sclera: Conjunctivae normal.      Pupils: Pupils are equal, round, and reactive to light.   Neck:      Thyroid: No thyroid mass or thyromegaly.   Cardiovascular:      Rate and Rhythm: Normal rate and regular rhythm.      Heart sounds: Normal heart sounds. No murmur heard.     No gallop.   Pulmonary:      Effort: Pulmonary effort is normal. No respiratory distress.      Breath sounds: Decreased air movement present.  Decreased breath sounds present. No wheezing or rales.   Skin:     General: Skin is warm and dry.      Coloration: Skin is not jaundiced or pale.   Neurological:      Mental Status: He is alert.   Psychiatric:         Mood and Affect: Mood normal.         Behavior: Behavior normal.         Thought Content: Thought content normal.         Judgment: Judgment normal.          Assessment and Plan  1. Chronic obstructive pulmonary disease, unspecified COPD type    2. Renal insufficiency             Return to clinic in 2 weeks for follow-up or as needed.    Health Maintenance Topics with due status: Not Due       Topic Last Completion Date    Hemoglobin A1c (Diabetic Prevention Screening) 04/21/2023    Lipid Panel 03/07/2024

## 2024-03-18 NOTE — PROGRESS NOTES
Sample of  Airsupra inhaler (LOT: [id number]  EXP: 02/2025)  given to patient during appt. Signed out of sample log book.  Sample of  Breztri inhaler (LOT: [id number]  EXP: 02/2026  &  LOT: [id number]  EXP: 02/2025)  given to pt during appt. Signed out of sample log book.

## 2024-03-21 DIAGNOSIS — Z12.11 SCREENING FOR COLON CANCER: Primary | ICD-10-CM

## 2024-03-21 RX ORDER — POLYETHYLENE GLYCOL 3350, SODIUM SULFATE ANHYDROUS, SODIUM BICARBONATE, SODIUM CHLORIDE, POTASSIUM CHLORIDE 236; 22.74; 6.74; 5.86; 2.97 G/4L; G/4L; G/4L; G/4L; G/4L
4 POWDER, FOR SOLUTION ORAL ONCE
Qty: 4000 ML | Refills: 0 | Status: SHIPPED | OUTPATIENT
Start: 2024-03-21 | End: 2024-03-21

## 2024-04-08 ENCOUNTER — OFFICE VISIT (OUTPATIENT)
Dept: FAMILY MEDICINE | Facility: CLINIC | Age: 69
End: 2024-04-08
Payer: COMMERCIAL

## 2024-04-08 VITALS
OXYGEN SATURATION: 97 % | HEART RATE: 88 BPM | DIASTOLIC BLOOD PRESSURE: 76 MMHG | HEIGHT: 71 IN | SYSTOLIC BLOOD PRESSURE: 127 MMHG | BODY MASS INDEX: 31.5 KG/M2 | RESPIRATION RATE: 19 BRPM | TEMPERATURE: 98 F | WEIGHT: 225 LBS

## 2024-04-08 DIAGNOSIS — J44.9 CHRONIC OBSTRUCTIVE PULMONARY DISEASE, UNSPECIFIED COPD TYPE: ICD-10-CM

## 2024-04-08 DIAGNOSIS — R35.0 FREQUENCY OF MICTURITION: Primary | ICD-10-CM

## 2024-04-08 LAB
BILIRUB SERPL-MCNC: NORMAL MG/DL
BLOOD URINE, POC: NORMAL
COLOR, POC UA: YELLOW
GLUCOSE UR QL STRIP: 500
KETONES UR QL STRIP: NORMAL
LEUKOCYTE ESTERASE URINE, POC: NORMAL
NITRITE, POC UA: NORMAL
PH, POC UA: 5.5
PROTEIN, POC: >=300
SPECIFIC GRAVITY, POC UA: >=1.03
UROBILINOGEN, POC UA: 1

## 2024-04-08 PROCEDURE — 81003 URINALYSIS AUTO W/O SCOPE: CPT | Mod: QW,,, | Performed by: FAMILY MEDICINE

## 2024-04-08 PROCEDURE — 87086 URINE CULTURE/COLONY COUNT: CPT | Mod: ,,, | Performed by: CLINICAL MEDICAL LABORATORY

## 2024-04-08 PROCEDURE — 99213 OFFICE O/P EST LOW 20 MIN: CPT | Mod: ,,, | Performed by: FAMILY MEDICINE

## 2024-04-08 RX ORDER — NITROFURANTOIN 25; 75 MG/1; MG/1
100 CAPSULE ORAL 2 TIMES DAILY
Qty: 14 CAPSULE | Refills: 0 | Status: SHIPPED | OUTPATIENT
Start: 2024-04-08 | End: 2024-05-23 | Stop reason: ALTCHOICE

## 2024-04-08 NOTE — PROGRESS NOTES
Mikhail Stanford is a 69 y.o. male seen today for follow-up on his COPD.  He reports the Breztri and AirSupra are working well with no side effects and would prefer to stay on these medications.  Patient's recent LDL cholesterol is close to goal and he has had no chest pain or shortness a breath above baseline.  Patient is complaining of increased urinary frequency both day and nighttime over the last 2-3 weeks.  So far patient has been afebrile.      Past Medical History:   Diagnosis Date    COPD (chronic obstructive pulmonary disease)     Hypertension     Sleep apnea      Family History   Problem Relation Age of Onset    Cancer Mother     Diabetes Sister     Hyperlipidemia Sister      Current Outpatient Medications on File Prior to Visit   Medication Sig Dispense Refill    albuterol (PROVENTIL HFA) 90 mcg/actuation inhaler Inhale 2 puffs into the lungs every 6 (six) hours as needed for Wheezing. Rescue 54 g 5    atorvastatin (LIPITOR) 40 MG tablet TAKE 1 TABLET BY MOUTH EVERY DAY 90 tablet 0    brimonidine 0.2% (ALPHAGAN) 0.2 % Drop       chlorhexidine (PERIDEX) 0.12 % solution 5 mLs 3 (three) times daily.      dapagliflozin propanediol (FARXIGA) 10 mg tablet Take 1 tablet (10 mg total) by mouth once daily. 90 tablet 3    esomeprazole (NEXIUM) 40 MG capsule TAKE 1 CAPSULE BY MOUTH EVERY DAY 90 capsule 1    ezetimibe (ZETIA) 10 mg tablet TAKE 1 TABLET BY MOUTH EVERY DAY 90 tablet 0    fluticasone propionate (FLONASE) 50 mcg/actuation nasal spray 2 sprays (100 mcg total) by Each Nostril route once daily. 48 g 1    tadalafiL (CIALIS) 20 MG Tab Take 1 tablet (20 mg total) by mouth daily as needed (intercourse). 24 tablet 3    valsartan-hydrochlorothiazide (DIOVAN-HCT) 160-12.5 mg per tablet 1 tablet Orally Once a day 90 tablet 0    [DISCONTINUED] budesonide-formoterol 160-4.5 mcg (SYMBICORT) 160-4.5 mcg/actuation HFAA Inhale 2 puffs into the lungs every 12 (twelve) hours. 10.2 g 3    [DISCONTINUED] tiotropium (SPIRIVA  WITH HANDIHALER) 18 mcg inhalation capsule INHALE THE CONTENTS OF 1   CAPSULE (VIA 2 INHALATIONS)DAILY. 90 capsule 1     No current facility-administered medications on file prior to visit.     Immunization History   Administered Date(s) Administered    Influenza (FLUAD) - Quadrivalent - Adjuvanted - PF *Preferred* (65+) 12/21/2023    Pneumococcal Conjugate - 20 Valent 01/30/2023       Review of Systems   Constitutional:  Negative for fever, malaise/fatigue and weight loss.   Respiratory:  Negative for shortness of breath.    Cardiovascular:  Negative for chest pain and palpitations.   Gastrointestinal:  Negative for nausea and vomiting.   Genitourinary:  Positive for frequency.   Psychiatric/Behavioral:  Negative for depression.         Vitals:    04/08/24 0957   BP: 127/76   Pulse: 88   Resp: 19   Temp: 98.1 °F (36.7 °C)       Physical Exam  Vitals reviewed.   Constitutional:       Appearance: Normal appearance.   HENT:      Head: Normocephalic.   Eyes:      Extraocular Movements: Extraocular movements intact.      Conjunctiva/sclera: Conjunctivae normal.      Pupils: Pupils are equal, round, and reactive to light.   Neck:      Thyroid: No thyroid mass or thyromegaly.   Cardiovascular:      Rate and Rhythm: Normal rate and regular rhythm.      Heart sounds: Normal heart sounds. No murmur heard.     No gallop.   Pulmonary:      Effort: Pulmonary effort is normal. No respiratory distress.      Breath sounds: Normal breath sounds. No wheezing or rales.   Skin:     General: Skin is warm and dry.      Coloration: Skin is not jaundiced or pale.   Neurological:      Mental Status: He is alert.   Psychiatric:         Mood and Affect: Mood normal.         Behavior: Behavior normal.         Thought Content: Thought content normal.         Judgment: Judgment normal.          Assessment and Plan  1. Frequency of micturition  -     POCT URINALYSIS W/O SCOPE  -     Urine culture  -     nitrofurantoin, macrocrystal-monohydrate,  (MACROBID) 100 MG capsule; Take 1 capsule (100 mg total) by mouth 2 (two) times daily.  Dispense: 14 capsule; Refill: 0    2. Chronic obstructive pulmonary disease, unspecified COPD type  -     albuterol-budesonide (AIRSUPRA) 90-80 mcg/actuation; Use 2 puffs every 6 hours as needed for shortness a breath  Dispense: 21.14 g; Refill: 11  -     budesonide-glycopyr-formoterol 160-9-4.8 mcg/actuation HFAA; Inhale 2 puffs b.i.d.  Dispense: 10.7 g; Refill: 11                   His urine dip does have small blood.      Return to clinic in 1 week for follow-up UA and possible prostate exam.    Health Maintenance Topics with due status: Not Due       Topic Last Completion Date    Hemoglobin A1c (Diabetic Prevention Screening) 04/21/2023    Annual UACr 07/12/2023    Lipid Panel 03/07/2024

## 2024-04-10 LAB — UA COMPLETE W REFLEX CULTURE PNL UR: NORMAL

## 2024-04-13 DIAGNOSIS — E78.5 HYPERLIPIDEMIA, UNSPECIFIED HYPERLIPIDEMIA TYPE: ICD-10-CM

## 2024-04-15 RX ORDER — ATORVASTATIN CALCIUM 40 MG/1
TABLET, FILM COATED ORAL
Qty: 90 TABLET | Refills: 0 | Status: SHIPPED | OUTPATIENT
Start: 2024-04-15

## 2024-04-18 ENCOUNTER — OFFICE VISIT (OUTPATIENT)
Dept: FAMILY MEDICINE | Facility: CLINIC | Age: 69
End: 2024-04-18
Payer: COMMERCIAL

## 2024-04-18 VITALS
HEART RATE: 95 BPM | OXYGEN SATURATION: 97 % | DIASTOLIC BLOOD PRESSURE: 80 MMHG | BODY MASS INDEX: 31.5 KG/M2 | TEMPERATURE: 99 F | WEIGHT: 225 LBS | HEIGHT: 71 IN | SYSTOLIC BLOOD PRESSURE: 116 MMHG | RESPIRATION RATE: 17 BRPM

## 2024-04-18 DIAGNOSIS — N40.0 ENLARGED PROSTATE: ICD-10-CM

## 2024-04-18 DIAGNOSIS — N30.01 ACUTE CYSTITIS WITH HEMATURIA: Primary | ICD-10-CM

## 2024-04-18 LAB
BILIRUB SERPL-MCNC: NORMAL MG/DL
BLOOD URINE, POC: NORMAL
COLOR, POC UA: YELLOW
GLUCOSE UR QL STRIP: 500
KETONES UR QL STRIP: NORMAL
LEUKOCYTE ESTERASE URINE, POC: NORMAL
NITRITE, POC UA: NORMAL
PH, POC UA: 5
PROTEIN, POC: 300
PSA SERPL-MCNC: 0.55 NG/ML
SPECIFIC GRAVITY, POC UA: 1.03
UROBILINOGEN, POC UA: 0.2

## 2024-04-18 PROCEDURE — 99213 OFFICE O/P EST LOW 20 MIN: CPT | Mod: ,,, | Performed by: FAMILY MEDICINE

## 2024-04-18 PROCEDURE — 81003 URINALYSIS AUTO W/O SCOPE: CPT | Mod: QW,,, | Performed by: FAMILY MEDICINE

## 2024-04-18 PROCEDURE — 84153 ASSAY OF PSA TOTAL: CPT | Mod: ,,, | Performed by: CLINICAL MEDICAL LABORATORY

## 2024-04-18 RX ORDER — TAMSULOSIN HYDROCHLORIDE 0.4 MG/1
0.4 CAPSULE ORAL DAILY
Qty: 90 CAPSULE | Refills: 1 | Status: SHIPPED | OUTPATIENT
Start: 2024-04-18 | End: 2025-04-18

## 2024-04-18 RX ORDER — PREDNISOLONE ACETATE 10 MG/ML
1 SUSPENSION/ DROPS OPHTHALMIC 2 TIMES DAILY
COMMUNITY
Start: 2024-04-14 | End: 2024-05-23 | Stop reason: ALTCHOICE

## 2024-04-18 NOTE — PROGRESS NOTES
Mikhail Stanford is a 69 y.o. male seen today for follow-up on urinary tract infection.  Patient reports symptoms have worsened with the antibiotic although his urinalysis did show only trace blood which is likely within the normal range with our testing.  Patient reports frequent voiding and difficulty sleeping secondary to urinary frequency at nighttime.   Patient is also complaining of worsening shortness of breath and is having difficulty finishing sentences currently.  We discussed a follow-up with pulmonology who will see the patient tomorrow.  We also discussed a trial of Flomax but reviewed the side effects of this medication to include lightheadedness and I have encouraged the patient to monitor for the side effects and let us know if the symptoms develop.    Past Medical History:   Diagnosis Date    COPD (chronic obstructive pulmonary disease)     Hypertension     Sleep apnea      Family History   Problem Relation Name Age of Onset    Cancer Mother      Diabetes Sister      Hyperlipidemia Sister       Current Outpatient Medications on File Prior to Visit   Medication Sig Dispense Refill    albuterol (PROVENTIL HFA) 90 mcg/actuation inhaler Inhale 2 puffs into the lungs every 6 (six) hours as needed for Wheezing. Rescue 54 g 5    albuterol-budesonide (AIRSUPRA) 90-80 mcg/actuation Use 2 puffs every 6 hours as needed for shortness a breath 21.14 g 11    atorvastatin (LIPITOR) 40 MG tablet TAKE 1 TABLET DAILY 90 tablet 0    brimonidine 0.2% (ALPHAGAN) 0.2 % Drop       budesonide-glycopyr-formoterol 160-9-4.8 mcg/actuation HFAA Inhale 2 puffs b.i.d. 10.7 g 11    chlorhexidine (PERIDEX) 0.12 % solution 5 mLs 3 (three) times daily.      dapagliflozin propanediol (FARXIGA) 10 mg tablet Take 1 tablet (10 mg total) by mouth once daily. 90 tablet 3    esomeprazole (NEXIUM) 40 MG capsule TAKE 1 CAPSULE BY MOUTH EVERY DAY 90 capsule 1    ezetimibe (ZETIA) 10 mg tablet TAKE 1 TABLET BY MOUTH EVERY DAY 90 tablet 0     fluticasone propionate (FLONASE) 50 mcg/actuation nasal spray 2 sprays (100 mcg total) by Each Nostril route once daily. 48 g 1    prednisoLONE acetate (PRED FORTE) 1 % DrpS Place 1 drop into the left eye 2 (two) times daily.      tadalafiL (CIALIS) 20 MG Tab Take 1 tablet (20 mg total) by mouth daily as needed (intercourse). 24 tablet 3    valsartan-hydrochlorothiazide (DIOVAN-HCT) 160-12.5 mg per tablet 1 tablet Orally Once a day 90 tablet 0    nitrofurantoin, macrocrystal-monohydrate, (MACROBID) 100 MG capsule Take 1 capsule (100 mg total) by mouth 2 (two) times daily. (Patient not taking: Reported on 4/18/2024) 14 capsule 0     No current facility-administered medications on file prior to visit.     Immunization History   Administered Date(s) Administered    Influenza (FLUAD) - Quadrivalent - Adjuvanted - PF *Preferred* (65+) 12/21/2023    Pneumococcal Conjugate - 20 Valent 01/30/2023       Review of Systems   Constitutional:  Negative for fever, malaise/fatigue and weight loss.   Respiratory:  Negative for shortness of breath.    Cardiovascular:  Negative for chest pain and palpitations.   Gastrointestinal:  Negative for nausea and vomiting.   Genitourinary:  Positive for frequency. Negative for hematuria.   Psychiatric/Behavioral:  Negative for depression.         Vitals:    04/18/24 1623   BP: 116/80   Pulse: 95   Resp: 17   Temp: 98.5 °F (36.9 °C)       Physical Exam  Eyes:      Conjunctiva/sclera: Conjunctivae normal.      Pupils: Pupils are equal, round, and reactive to light.   Pulmonary:      Effort: Pulmonary effort is normal.   Genitourinary:     Prostate: Enlarged and nodules present. Not tender.   Neurological:      Mental Status: He is alert.   Psychiatric:         Mood and Affect: Mood normal.         Behavior: Behavior normal.         Thought Content: Thought content normal.         Judgment: Judgment normal.          Assessment and Plan  1. Acute cystitis with hematuria  -     POCT URINALYSIS W/O  SCOPE    2. Enlarged prostate  -     PSA, Total (Diagnostic); Future; Expected date: 04/18/2024  -     tamsulosin (FLOMAX) 0.4 mg Cap; Take 1 capsule (0.4 mg total) by mouth once daily.  Dispense: 90 capsule; Refill: 1  -     Ambulatory referral/consult to Urology; Future; Expected date: 04/25/2024             Return to clinic in 1 month or as needed.    Health Maintenance Topics with due status: Not Due       Topic Last Completion Date    Hemoglobin A1c (Diabetic Prevention Screening) 04/21/2023    Annual UACr 07/12/2023    Lipid Panel 03/07/2024

## 2024-04-19 ENCOUNTER — TELEPHONE (OUTPATIENT)
Dept: FAMILY MEDICINE | Facility: CLINIC | Age: 69
End: 2024-04-19
Payer: COMMERCIAL

## 2024-04-19 ENCOUNTER — OFFICE VISIT (OUTPATIENT)
Dept: PULMONOLOGY | Facility: CLINIC | Age: 69
End: 2024-04-19
Payer: COMMERCIAL

## 2024-04-19 VITALS
BODY MASS INDEX: 32.53 KG/M2 | WEIGHT: 232.38 LBS | HEIGHT: 71 IN | RESPIRATION RATE: 18 BRPM | SYSTOLIC BLOOD PRESSURE: 123 MMHG | OXYGEN SATURATION: 96 % | HEART RATE: 94 BPM | DIASTOLIC BLOOD PRESSURE: 74 MMHG

## 2024-04-19 DIAGNOSIS — R22.2 LOCALIZED SWELLING, MASS AND LUMP, TRUNK: Primary | ICD-10-CM

## 2024-04-19 DIAGNOSIS — J44.9 CHRONIC OBSTRUCTIVE PULMONARY DISEASE, UNSPECIFIED COPD TYPE: ICD-10-CM

## 2024-04-19 DIAGNOSIS — R91.8 LUNG MASS: ICD-10-CM

## 2024-04-19 PROBLEM — J84.9 INTERSTITIAL LUNG DISEASE: Status: RESOLVED | Noted: 2022-03-09 | Resolved: 2024-04-19

## 2024-04-19 PROCEDURE — 99215 OFFICE O/P EST HI 40 MIN: CPT | Mod: PBBFAC | Performed by: INTERNAL MEDICINE

## 2024-04-19 PROCEDURE — 99214 OFFICE O/P EST MOD 30 MIN: CPT | Mod: S$PBB,,, | Performed by: INTERNAL MEDICINE

## 2024-04-19 NOTE — PROGRESS NOTES
"Subjective:       Patient ID: Mikhail Stanford is a 69 y.o. male.    Chief Complaint: Shortness of Breath (Pt.states shortness of breath )    Shortness of Breath  This is a chronic problem. The current episode started more than 1 month ago. The problem has been unchanged. Pertinent negatives include no abdominal pain, chest pain, ear pain, headaches or rash. The symptoms are aggravated by exercise.     Past Medical History:   Diagnosis Date    COPD (chronic obstructive pulmonary disease)     Hypertension     Sleep apnea      Past Surgical History:   Procedure Laterality Date    DENTAL SURGERY      EXCISION OF LESION OF LIP N/A 2023    Procedure: EXCISION, LESION, LIP;  Surgeon: Kuldeep Yao MD;  Location: [facility];  Service: ENT;  Laterality: N/A;    HERNIA REPAIR  2020    LEFT HEART CATHETERIZATION N/A 7/10/2023    Procedure: Left heart cath;  Surgeon: Jeremias Simmons MD;  Location: [facility];  Service: Cardiology;  Laterality: N/A;     Family History   Problem Relation Name Age of Onset    Cancer Mother      Diabetes Sister      Hyperlipidemia Sister       Review of patient's allergies indicates:  No Known Allergies   Social History     Tobacco Use    Smoking status: Former     Current packs/day: 0.00     Average packs/day: 2.0 packs/day for 40.0 years (80.0 ttl pk-yrs)     Types: Cigarettes     Start date:      Quit date:      Years since quittin.3     Passive exposure: Past    Smokeless tobacco: Former     Types: Chew     Quit date: 2006   Substance Use Topics    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   HENT:  Negative for congestion and ear pain.    Eyes:  Negative for redness and itching.   Respiratory:  Positive for shortness of breath.  " "  Cardiovascular:  Negative for chest pain and palpitations.   Musculoskeletal:  Negative for arthralgias and back pain.   Skin:  Negative for rash.   Gastrointestinal:  Negative for abdominal pain and abdominal distention.   Neurological:  Negative for dizziness and headaches.   Hematological:  Negative for adenopathy. Does not bruise/bleed easily.   Psychiatric/Behavioral:  Negative for confusion. The patient is not nervous/anxious.        Objective:      Physical Exam   Constitutional: He is oriented to person, place, and time. He appears well-developed and well-nourished.   HENT:   Head: Normocephalic.   Nose: Nose normal.   Mouth/Throat: Oropharynx is clear and moist.   Neck: No JVD present. No thyromegaly present.   Cardiovascular: Normal rate, regular rhythm, normal heart sounds and intact distal pulses.   Pulmonary/Chest: Normal expansion, hyperinflation, symmetric chest wall expansion, effort normal and breath sounds normal.   Abdominal: Soft. Bowel sounds are normal.   Musculoskeletal:         General: Normal range of motion.      Cervical back: Normal range of motion and neck supple.   Lymphadenopathy: No supraclavicular adenopathy is present.     He has no cervical adenopathy.   Neurological: He is alert and oriented to person, place, and time. He has normal reflexes.   Skin: Skin is warm and dry.   Psychiatric: He has a normal mood and affect. His behavior is normal.     Personal Diagnostic Review  none pertinent        4/19/2024     9:24 AM 4/18/2024     4:23 PM 4/8/2024     9:57 AM 3/18/2024     3:45 PM 3/5/2024    10:28 AM 12/21/2023     9:50 AM 9/21/2023     9:48 AM   Pulmonary Function Tests   SpO2 96 % 97 % 97 % 98 % 95 % 96 % 96 %   Height 5' 11" (1.803 m) 5' 11" (1.803 m) 5' 11" (1.803 m) 5' 11" (1.803 m) 5' 11" (1.803 m) 5' 11" (1.803 m) 5' 11" (1.803 m)   Weight 105.4 kg (232 lb 6.4 oz) 102.1 kg (225 lb) 102.1 kg (225 lb) 102.1 kg (225 lb) 102.1 kg (225 lb) 105.2 kg (232 lb) 105.2 kg (232 lb) "   BMI (Calculated) 32.4 31.4 31.4 31.4 31.4 32.4 32.4         Assessment:       1. Localized swelling, mass and lump, trunk    2. Chronic obstructive pulmonary disease, unspecified COPD type    3. Lung mass        Outpatient Encounter Medications as of 4/19/2024   Medication Sig Dispense Refill    atorvastatin (LIPITOR) 40 MG tablet TAKE 1 TABLET DAILY 90 tablet 0    brimonidine 0.2% (ALPHAGAN) 0.2 % Drop       budesonide-glycopyr-formoterol 160-9-4.8 mcg/actuation HFAA Inhale 2 puffs b.i.d. 10.7 g 11    chlorhexidine (PERIDEX) 0.12 % solution 5 mLs 3 (three) times daily.      dapagliflozin propanediol (FARXIGA) 10 mg tablet Take 1 tablet (10 mg total) by mouth once daily. 90 tablet 3    esomeprazole (NEXIUM) 40 MG capsule TAKE 1 CAPSULE BY MOUTH EVERY DAY 90 capsule 1    ezetimibe (ZETIA) 10 mg tablet TAKE 1 TABLET BY MOUTH EVERY DAY 90 tablet 0    fluticasone propionate (FLONASE) 50 mcg/actuation nasal spray 2 sprays (100 mcg total) by Each Nostril route once daily. 48 g 1    nitrofurantoin, macrocrystal-monohydrate, (MACROBID) 100 MG capsule Take 1 capsule (100 mg total) by mouth 2 (two) times daily. 14 capsule 0    prednisoLONE acetate (PRED FORTE) 1 % DrpS Place 1 drop into the left eye 2 (two) times daily.      tadalafiL (CIALIS) 20 MG Tab Take 1 tablet (20 mg total) by mouth daily as needed (intercourse). 24 tablet 3    tamsulosin (FLOMAX) 0.4 mg Cap Take 1 capsule (0.4 mg total) by mouth once daily. 90 capsule 1    valsartan-hydrochlorothiazide (DIOVAN-HCT) 160-12.5 mg per tablet 1 tablet Orally Once a day 90 tablet 0    albuterol (PROVENTIL HFA) 90 mcg/actuation inhaler Inhale 2 puffs into the lungs every 6 (six) hours as needed for Wheezing. Rescue (Patient not taking: Reported on 4/19/2024) 54 g 5    albuterol-budesonide (AIRSUPRA) 90-80 mcg/actuation Use 2 puffs every 6 hours as needed for shortness a breath (Patient not taking: Reported on 4/19/2024) 21.14 g 11     No facility-administered encounter  medications on file as of 4/19/2024.     Orders Placed This Encounter   Procedures    CT Chest Without Contrast     Standing Status:   Future     Standing Expiration Date:   4/19/2025     Order Specific Question:   May the Radiologist modify the order per protocol to meet the clinical needs of the patient?     Answer:   Yes     Order Specific Question:   Access port per protocol?     Answer:   No       Plan:       Problem List Items Addressed This Visit          Pulmonary    Chronic obstructive pulmonary disease     Patient has smoked 19 years ago we did a review of all his data today he has pulmonary functions that show prism, reduced spirometry with normal ratio associated with restrictive disease there has no evidence of interstitial lung disease on previous CTs I will proceed on with bronchodilators he has just been given a triple inhaler will seen back in 1 month and re-evaluate         Lung mass     Patient had area of atelectasis last checked in June of 2023 we need to review that again were going to order a CT will seen back in 1 month          Other Visit Diagnoses       Localized swelling, mass and lump, trunk    -  Primary    Relevant Orders    CT Chest Without Contrast

## 2024-04-19 NOTE — TELEPHONE ENCOUNTER
----- Message from Helena Tillman LPN sent at 4/19/2024  1:50 PM CDT -----    ----- Message -----  From: Vishnu Ballesteros MD  Sent: 4/19/2024   8:05 AM CDT  To: [facility] Staff    PSA is normal.

## 2024-04-19 NOTE — ASSESSMENT & PLAN NOTE
Patient has smoked 19 years ago we did a review of all his data today he has pulmonary functions that show prism, reduced spirometry with normal ratio associated with restrictive disease there has no evidence of interstitial lung disease on previous CTs I will proceed on with bronchodilators he has just been given a triple inhaler will seen back in 1 month and re-evaluate

## 2024-04-19 NOTE — ASSESSMENT & PLAN NOTE
Patient had area of atelectasis last checked in June of 2023 we need to review that again were going to order a CT will seen back in 1 month

## 2024-05-07 ENCOUNTER — OFFICE VISIT (OUTPATIENT)
Dept: UROLOGY | Facility: CLINIC | Age: 69
End: 2024-05-07
Payer: COMMERCIAL

## 2024-05-07 VITALS
HEIGHT: 71 IN | OXYGEN SATURATION: 95 % | WEIGHT: 230 LBS | DIASTOLIC BLOOD PRESSURE: 78 MMHG | TEMPERATURE: 98 F | HEART RATE: 106 BPM | SYSTOLIC BLOOD PRESSURE: 112 MMHG | BODY MASS INDEX: 32.2 KG/M2

## 2024-05-07 DIAGNOSIS — R35.1 NOCTURIA: ICD-10-CM

## 2024-05-07 DIAGNOSIS — R35.0 FREQUENCY OF URINATION: ICD-10-CM

## 2024-05-07 DIAGNOSIS — R39.12 WEAK URINARY STREAM: ICD-10-CM

## 2024-05-07 DIAGNOSIS — N40.1 BENIGN PROSTATIC HYPERPLASIA WITH INCOMPLETE BLADDER EMPTYING: Primary | ICD-10-CM

## 2024-05-07 DIAGNOSIS — R39.15 URGENCY OF URINATION: ICD-10-CM

## 2024-05-07 DIAGNOSIS — R39.14 BENIGN PROSTATIC HYPERPLASIA WITH INCOMPLETE BLADDER EMPTYING: Primary | ICD-10-CM

## 2024-05-07 PROBLEM — N18.2 CHRONIC KIDNEY DISEASE, STAGE 2 (MILD): Status: ACTIVE | Noted: 2024-05-07

## 2024-05-07 PROCEDURE — 99215 OFFICE O/P EST HI 40 MIN: CPT | Mod: PBBFAC | Performed by: NURSE PRACTITIONER

## 2024-05-07 PROCEDURE — 99203 OFFICE O/P NEW LOW 30 MIN: CPT | Mod: S$PBB,,, | Performed by: NURSE PRACTITIONER

## 2024-05-07 NOTE — PROGRESS NOTES
Subjective     Patient ID: Mikhail Stanford is a 69 y.o. male.    Chief Complaint: No chief complaint on file.    This pleasant 69 year old male presents to the clinic as a new patient referral from Dr. DAVID Ballesteros for Enlarged Prostate.  Patient states symptoms started about one year ago.  His IPSS score is 27 that reflects incomplete bladder emptying, frequency, intermittency, urgency, weak stream, and nocturia 5 times a night.  His PVR is 033 mls.  He denies dysuria, hematuria or straining to urinate.   He denies fever, chills, nausea or vomiting.  He denies abdominal, bladder or back pain.  He denies any family history of prostate cancer.  He denies smoking or drinking alcohol.  His PSA's are WNL.  His urine culture on April 8, 2024 was negative.  He was started on Flomax 0.4 mg about 3 weeks ago.  I discussed giving the medication more time to work.  He will continue the Flomax 0.4 mg one at bedtime for another week or two and if he tolerates the medication he will increase the Flomax to one twice a day.  If he cannot tolerate the increase he will go back to one Flomax at bedtime.  We discussed if no improvement at the next visit then we will consider adding Finasteride versus the BPH work up for possible TURP.  I will see him back in the clinic in 2 months or sooner if needed.  I discussed the plan in detail with Urologist Dr. DENISA Parson and the patient and they are in agreement with the plan.  All his questions were answered at today's visit. I spent 30 minutes counseling this patient.        PSA History:   PSA was 0.554 on 04/18/2024  PSA was 0.603 on 01/31/2023  PSA was 0.415 on 10/04/2021   -----------------------------------------------------------------------------------------------------------  [May 7, 2024].         Review of Systems   Constitutional:  Negative for activity change and fever.   HENT:  Negative for hearing loss and trouble swallowing.    Eyes:  Negative for visual disturbance.   Respiratory:   Negative for cough, shortness of breath and wheezing.    Cardiovascular:  Negative for chest pain.   Gastrointestinal:  Negative for abdominal pain, diarrhea, nausea and vomiting.   Endocrine: Negative for polyuria.   Genitourinary:  Positive for frequency and urgency. Negative for bladder incontinence, decreased urine volume, difficulty urinating, discharge, dysuria, enuresis, erectile dysfunction, flank pain, genital sores, hematuria, penile pain, penile swelling, scrotal swelling and testicular pain.        BPH with incomplete bladder emptying          Nocturia          Weak Urinary Stream    Musculoskeletal:  Negative for back pain and gait problem.   Integumentary:  Negative for rash.   Neurological:  Negative for speech difficulty and weakness.   Psychiatric/Behavioral:  Negative for behavioral problems and confusion.           Objective     Physical Exam  Vitals and nursing note reviewed.   Constitutional:       General: He is not in acute distress.     Appearance: Normal appearance. He is not ill-appearing, toxic-appearing or diaphoretic.   HENT:      Head: Normocephalic.   Eyes:      Extraocular Movements: Extraocular movements intact.   Cardiovascular:      Rate and Rhythm: Normal rate and regular rhythm.      Heart sounds: Normal heart sounds.   Pulmonary:      Effort: Pulmonary effort is normal. No respiratory distress.      Breath sounds: Normal breath sounds. No wheezing, rhonchi or rales.   Abdominal:      General: Bowel sounds are normal.      Palpations: Abdomen is soft.      Tenderness: There is no abdominal tenderness. There is no right CVA tenderness, left CVA tenderness, guarding or rebound.   Genitourinary:     Comments: BRENNEN Deferred   Musculoskeletal:         General: Normal range of motion.      Cervical back: Normal range of motion. No rigidity.   Skin:     General: Skin is warm and dry.   Neurological:      General: No focal deficit present.      Mental Status: He is alert and oriented to  person, place, and time.      Motor: No weakness.      Coordination: Coordination normal.      Gait: Gait normal.   Psychiatric:         Mood and Affect: Mood normal.         Behavior: Behavior normal.         Thought Content: Thought content normal.        Assessment and Plan     Problem List Items Addressed This Visit          Renal/    Benign prostatic hyperplasia with incomplete bladder emptying - Primary    Nocturia    Urgency of urination    Frequency of urination    Weak urinary stream        Continue Flomax 0.4 mg one at bedtime for two weeks -- if still tolerating this medication then can increase to one twice a day and if unable to tolerate the one twice a day then go back to one capsule at bedtime  -- read up on side effects -- discussed can cause dizziness and postural hypotension   PSA's are normal   Urine culture on April 8, 2024 was negative   Consider adding Finasteride or BPH work up at the next visit if no improvement   Follow up with Urology NP BRET Mazariegos in 2 months

## 2024-05-07 NOTE — PATIENT INSTRUCTIONS
Continue Flomax 0.4 mg one at bedtime for two weeks -- if still tolerating this medication then can increase to one twice a day and if unable to tolerate the one twice a day then go back to one capsule at bedtime  -- read up on side effects -- discussed can cause dizziness and postural hypotension   PSA's are normal   Urine culture on April 8, 2024 was negative   Consider adding Finasteride or BPH work up at the next visit if no improvement   Follow up with Urology BELTRAN Mazariegos in 2 months

## 2024-05-13 ENCOUNTER — HOSPITAL ENCOUNTER (OUTPATIENT)
Dept: RADIOLOGY | Facility: HOSPITAL | Age: 69
Discharge: HOME | End: 2024-05-13
Attending: INTERNAL MEDICINE
Payer: COMMERCIAL

## 2024-05-13 DIAGNOSIS — R22.2 LOCALIZED SWELLING, MASS AND LUMP, TRUNK: ICD-10-CM

## 2024-05-13 PROCEDURE — 71250 CT THORAX DX C-: CPT | Mod: 26,,, | Performed by: STUDENT IN AN ORGANIZED HEALTH CARE EDUCATION/TRAINING PROGRAM

## 2024-05-13 PROCEDURE — 71250 CT THORAX DX C-: CPT | Mod: TC

## 2024-05-21 DIAGNOSIS — E78.5 HYPERLIPIDEMIA, UNSPECIFIED HYPERLIPIDEMIA TYPE: ICD-10-CM

## 2024-05-21 RX ORDER — EZETIMIBE 10 MG/1
TABLET ORAL
Qty: 90 TABLET | Refills: 0 | Status: SHIPPED | OUTPATIENT
Start: 2024-05-21

## 2024-05-23 ENCOUNTER — OFFICE VISIT (OUTPATIENT)
Dept: FAMILY MEDICINE | Facility: CLINIC | Age: 69
End: 2024-05-23
Payer: COMMERCIAL

## 2024-05-23 VITALS
HEART RATE: 88 BPM | TEMPERATURE: 98 F | HEIGHT: 71 IN | RESPIRATION RATE: 18 BRPM | DIASTOLIC BLOOD PRESSURE: 88 MMHG | WEIGHT: 233 LBS | OXYGEN SATURATION: 95 % | SYSTOLIC BLOOD PRESSURE: 130 MMHG | BODY MASS INDEX: 32.62 KG/M2

## 2024-05-23 DIAGNOSIS — I10 HYPERTENSION, UNSPECIFIED TYPE: ICD-10-CM

## 2024-05-23 DIAGNOSIS — M79.601 PAIN OF RIGHT UPPER EXTREMITY: ICD-10-CM

## 2024-05-23 DIAGNOSIS — S46.209A INJURY OF TENDON OF BICEPS: Primary | ICD-10-CM

## 2024-05-23 PROCEDURE — 99213 OFFICE O/P EST LOW 20 MIN: CPT | Mod: ,,, | Performed by: FAMILY MEDICINE

## 2024-05-23 RX ORDER — VALSARTAN AND HYDROCHLOROTHIAZIDE 160; 12.5 MG/1; MG/1
TABLET, FILM COATED ORAL
Qty: 90 TABLET | Refills: 1 | Status: SHIPPED | OUTPATIENT
Start: 2024-05-23

## 2024-05-23 NOTE — PROGRESS NOTES
Mikhail Stanford is a 69 y.o. male seen today for follow-up on his BPH.  Patient has been seen by Urology and they have doubled his Flomax dose with good effect and no side effects.  Patient is complaining of right upper extremity discomfort over the last 2-3 weeks.  Patient reports that he was lifting his daughters bed when he felt a pop sensation in his right upper extremity near his biceps.  Since he has had pain radiating from the upper biceps area to his wrist.  X-rays of the shoulder and upper arm do not seem to show any abnormality other than some mild arthritis in the AC joint.    Past Medical History:   Diagnosis Date    COPD (chronic obstructive pulmonary disease)     Hypertension     Sleep apnea      Family History   Problem Relation Name Age of Onset    Cancer Mother      Diabetes Sister      Hyperlipidemia Sister       Current Outpatient Medications on File Prior to Visit   Medication Sig Dispense Refill    albuterol-budesonide (AIRSUPRA) 90-80 mcg/actuation Use 2 puffs every 6 hours as needed for shortness a breath 21.14 g 11    atorvastatin (LIPITOR) 40 MG tablet TAKE 1 TABLET DAILY 90 tablet 0    brimonidine 0.2% (ALPHAGAN) 0.2 % Drop Place 1 drop into the left eye 2 (two) times a day.      budesonide-glycopyr-formoterol 160-9-4.8 mcg/actuation HFAA Inhale 2 puffs b.i.d. (Patient taking differently: Inhale 2 puffs into the lungs 2 (two) times a day. Inhale 2 puffs b.i.d.) 10.7 g 11    chlorhexidine (PERIDEX) 0.12 % solution 5 mLs 3 (three) times daily.      dapagliflozin propanediol (FARXIGA) 10 mg tablet Take 1 tablet (10 mg total) by mouth once daily. 90 tablet 3    esomeprazole (NEXIUM) 40 MG capsule TAKE 1 CAPSULE BY MOUTH EVERY DAY 90 capsule 1    ezetimibe (ZETIA) 10 mg tablet TAKE 1 TABLET DAILY 90 tablet 0    fluticasone propionate (FLONASE) 50 mcg/actuation nasal spray 2 sprays (100 mcg total) by Each Nostril route once daily. 48 g 1    tadalafiL (CIALIS) 20 MG Tab Take 1  tablet (20 mg total) by mouth daily as needed (intercourse). 24 tablet 3    tamsulosin (FLOMAX) 0.4 mg Cap Take 1 capsule (0.4 mg total) by mouth once daily. 90 capsule 1    [DISCONTINUED] valsartan-hydrochlorothiazide (DIOVAN-HCT) 160-12.5 mg per tablet 1 tablet Orally Once a day 90 tablet 0    albuterol (PROVENTIL HFA) 90 mcg/actuation inhaler Inhale 2 puffs into the lungs every 6 (six) hours as needed for Wheezing. Rescue (Patient not taking: Reported on 4/19/2024) 54 g 5    nitrofurantoin, macrocrystal-monohydrate, (MACROBID) 100 MG capsule Take 1 capsule (100 mg total) by mouth 2 (two) times daily. (Patient not taking: Reported on 5/7/2024) 14 capsule 0    prednisoLONE acetate (PRED FORTE) 1 % DrpS Place 1 drop into the left eye 2 (two) times daily. (Patient not taking: Reported on 5/7/2024)       No current facility-administered medications on file prior to visit.     Immunization History   Administered Date(s) Administered    Influenza (FLUAD) - Quadrivalent - Adjuvanted - PF *Preferred* (65+) 12/21/2023    Pneumococcal Conjugate - 20 Valent 01/30/2023       Review of Systems   Constitutional:  Negative for fever, malaise/fatigue and weight loss.   Respiratory:  Negative for shortness of breath.    Cardiovascular:  Negative for chest pain and palpitations.   Gastrointestinal:  Negative for nausea and vomiting.   Musculoskeletal:  Positive for joint pain and myalgias.   Psychiatric/Behavioral:  Negative for depression.       Vitals:    05/23/24 0859   BP: 130/88   Pulse: 88   Resp: 18   Temp: 98.2 °F (36.8 °C)       Physical Exam  Vitals reviewed.   Eyes:      Conjunctiva/sclera: Conjunctivae normal.   Pulmonary:      Effort: Pulmonary effort is normal.   Musculoskeletal:      Right shoulder: No tenderness, bony tenderness or crepitus. Normal range of motion.      Right upper arm: Tenderness present. No swelling, edema, deformity or bony tenderness.      Right elbow: No deformity or effusion. Normal  range of motion. No tenderness.      Right forearm: Normal.      Right wrist: Normal. No tenderness, snuff box tenderness or crepitus. Normal range of motion.        Arms:       Comments: Patient is tender over the biceps tendon but no deformity is present   Neurological:      Mental Status: He is alert.   Psychiatric:         Mood and Affect: Mood normal.         Behavior: Behavior normal.         Thought Content: Thought content normal.         Judgment: Judgment normal.        Assessment and Plan  1. Injury of tendon of biceps    2. Pain of right upper extremity  -     X-ray Shoulder 2 or More Views Right; Future; Expected date: 05/23/2024    3. Hypertension, unspecified type  -     valsartan-hydrochlorothiazide (DIOVAN-HCT) 160-12.5 mg per tablet; 1 tablet Orally Once a day  Dispense: 90 tablet; Refill: 1             Return to clinic 2 months or as needed.    Health Maintenance Topics with due status: Not Due       Topic Last Completion Date    Hemoglobin A1c (Diabetic Prevention Screening) 04/21/2023    Annual UACr 07/12/2023    Lipid Panel 03/07/2024

## 2024-05-24 ENCOUNTER — OFFICE VISIT (OUTPATIENT)
Dept: PULMONOLOGY | Facility: CLINIC | Age: 69
End: 2024-05-24
Payer: COMMERCIAL

## 2024-05-24 ENCOUNTER — TELEPHONE (OUTPATIENT)
Dept: FAMILY MEDICINE | Facility: CLINIC | Age: 69
End: 2024-05-24
Payer: COMMERCIAL

## 2024-05-24 VITALS
OXYGEN SATURATION: 97 % | HEIGHT: 71 IN | SYSTOLIC BLOOD PRESSURE: 131 MMHG | BODY MASS INDEX: 32.62 KG/M2 | RESPIRATION RATE: 20 BRPM | HEART RATE: 94 BPM | WEIGHT: 233 LBS | DIASTOLIC BLOOD PRESSURE: 83 MMHG

## 2024-05-24 DIAGNOSIS — R91.8 LUNG MASS: ICD-10-CM

## 2024-05-24 DIAGNOSIS — R22.2 LOCALIZED SWELLING, MASS AND LUMP, TRUNK: Primary | ICD-10-CM

## 2024-05-24 DIAGNOSIS — J44.9 CHRONIC OBSTRUCTIVE PULMONARY DISEASE, UNSPECIFIED COPD TYPE: ICD-10-CM

## 2024-05-24 DIAGNOSIS — M19.011 OSTEOARTHRITIS OF RIGHT SHOULDER, UNSPECIFIED OSTEOARTHRITIS TYPE: Primary | ICD-10-CM

## 2024-05-24 PROCEDURE — 99215 OFFICE O/P EST HI 40 MIN: CPT | Mod: PBBFAC | Performed by: INTERNAL MEDICINE

## 2024-05-24 PROCEDURE — 99214 OFFICE O/P EST MOD 30 MIN: CPT | Mod: S$PBB,,, | Performed by: INTERNAL MEDICINE

## 2024-05-24 NOTE — PROGRESS NOTES
"Subjective:       Patient ID: Mikhail Stanford is a 69 y.o. male.    Chief Complaint: Shortness of Breath (With exertion ) and Localized swelling, mass and lump, trunk    Shortness of Breath  This is a chronic problem. The current episode started more than 1 month ago. The problem has been unchanged. Pertinent negatives include no abdominal pain, chest pain, ear pain, headaches or rash. The symptoms are aggravated by exercise.     Past Medical History:   Diagnosis Date    COPD (chronic obstructive pulmonary disease)     Hypertension     Sleep apnea      Past Surgical History:   Procedure Laterality Date    DENTAL SURGERY      EXCISION OF LESION OF LIP N/A 2023    Procedure: EXCISION, LESION, LIP;  Surgeon: Kuldeep Yao MD;  Location: [facility] OR;  Service: ENT;  Laterality: N/A;    HERNIA REPAIR  2020    LEFT HEART CATHETERIZATION N/A 7/10/2023    Procedure: Left heart cath;  Surgeon: Jeremias Simmons MD;  Location: [facility] CATH LAB;  Service: Cardiology;  Laterality: N/A;     Family History   Problem Relation Name Age of Onset    Cancer Mother      Diabetes Sister      Hyperlipidemia Sister       Review of patient's allergies indicates:  No Known Allergies   Social History     Tobacco Use    Smoking status: Former     Current packs/day: 0.00     Average packs/day: 2.0 packs/day for 40.0 years (80.0 ttl pk-yrs)     Types: Cigarettes     Start date:      Quit date:      Years since quittin.4     Passive exposure: Past    Smokeless tobacco: Former     Types: Chew     Quit date: 2006   Substance Use Topics    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   HENT:  Negative for congestion and ear pain.    Eyes:  Negative for redness and itching.   Respiratory:  Positive for " "shortness of breath.    Cardiovascular:  Negative for chest pain and palpitations.   Musculoskeletal:  Negative for arthralgias and back pain.   Skin:  Negative for rash.   Gastrointestinal:  Negative for abdominal pain and abdominal distention.   Neurological:  Negative for dizziness and headaches.   Hematological:  Negative for adenopathy. Does not bruise/bleed easily.   Psychiatric/Behavioral:  Negative for confusion. The patient is not nervous/anxious.        Objective:      Physical Exam   Constitutional: He is oriented to person, place, and time. He appears well-developed and well-nourished.   HENT:   Head: Normocephalic.   Nose: Nose normal.   Mouth/Throat: Oropharynx is clear and moist.   Neck: No JVD present. No thyromegaly present.   Cardiovascular: Normal rate, regular rhythm, normal heart sounds and intact distal pulses.   Pulmonary/Chest: Normal expansion, hyperinflation, symmetric chest wall expansion, effort normal and breath sounds normal.   Abdominal: Soft. Bowel sounds are normal.   Musculoskeletal:         General: Normal range of motion.      Cervical back: Normal range of motion and neck supple.   Lymphadenopathy: No supraclavicular adenopathy is present.     He has no cervical adenopathy.   Neurological: He is alert and oriented to person, place, and time. He has normal reflexes.   Skin: Skin is warm and dry.   Psychiatric: He has a normal mood and affect. His behavior is normal.     Personal Diagnostic Review  none pertinent        5/24/2024     9:25 AM 5/23/2024     8:59 AM 5/7/2024    10:24 AM 4/19/2024     9:24 AM 4/18/2024     4:23 PM 4/8/2024     9:57 AM 3/18/2024     3:45 PM   Pulmonary Function Tests   SpO2 97 % 95 % 95 % 96 % 97 % 97 % 98 %   Height 5' 11" (1.803 m) 5' 11" (1.803 m) 5' 11" (1.803 m) 5' 11" (1.803 m) 5' 11" (1.803 m) 5' 11" (1.803 m) 5' 11" (1.803 m)   Weight 105.7 kg (233 lb 0.4 oz) 105.7 kg (233 lb) 104.3 kg (230 lb) 105.4 kg (232 lb 6.4 oz) 102.1 kg (225 lb) 102.1 kg " (225 lb) 102.1 kg (225 lb)   BMI (Calculated) 32.5 32.5 32.1 32.4 31.4 31.4 31.4         Assessment:       1. Localized swelling, mass and lump, trunk    2. Chronic obstructive pulmonary disease, unspecified COPD type    3. Lung mass        Outpatient Encounter Medications as of 5/24/2024   Medication Sig Dispense Refill    albuterol-budesonide (AIRSUPRA) 90-80 mcg/actuation Use 2 puffs every 6 hours as needed for shortness a breath 21.14 g 11    atorvastatin (LIPITOR) 40 MG tablet TAKE 1 TABLET DAILY 90 tablet 0    brimonidine 0.2% (ALPHAGAN) 0.2 % Drop Place 1 drop into the left eye 2 (two) times a day.      chlorhexidine (PERIDEX) 0.12 % solution 5 mLs 3 (three) times daily.      dapagliflozin propanediol (FARXIGA) 10 mg tablet Take 1 tablet (10 mg total) by mouth once daily. 90 tablet 3    esomeprazole (NEXIUM) 40 MG capsule TAKE 1 CAPSULE BY MOUTH EVERY DAY 90 capsule 1    ezetimibe (ZETIA) 10 mg tablet TAKE 1 TABLET DAILY 90 tablet 0    fluticasone propionate (FLONASE) 50 mcg/actuation nasal spray 2 sprays (100 mcg total) by Each Nostril route once daily. 48 g 1    tadalafiL (CIALIS) 20 MG Tab Take 1 tablet (20 mg total) by mouth daily as needed (intercourse). 24 tablet 3    tamsulosin (FLOMAX) 0.4 mg Cap Take 1 capsule (0.4 mg total) by mouth once daily. (Patient taking differently: Take 0.4 mg by mouth once daily. Take twice daily) 90 capsule 1    valsartan-hydrochlorothiazide (DIOVAN-HCT) 160-12.5 mg per tablet 1 tablet Orally Once a day 90 tablet 1    [DISCONTINUED] albuterol (PROVENTIL HFA) 90 mcg/actuation inhaler Inhale 2 puffs into the lungs every 6 (six) hours as needed for Wheezing. Rescue (Patient not taking: Reported on 4/19/2024) 54 g 5    [DISCONTINUED] budesonide-glycopyr-formoterol 160-9-4.8 mcg/actuation HFAA Inhale 2 puffs b.i.d. (Patient taking differently: Inhale 2 puffs into the lungs 2 (two) times a day. Inhale 2 puffs b.i.d.) 10.7 g 11    [DISCONTINUED] ezetimibe (ZETIA) 10 mg tablet  TAKE 1 TABLET BY MOUTH EVERY DAY 90 tablet 0    [DISCONTINUED] nitrofurantoin, macrocrystal-monohydrate, (MACROBID) 100 MG capsule Take 1 capsule (100 mg total) by mouth 2 (two) times daily. (Patient not taking: Reported on 5/7/2024) 14 capsule 0    [DISCONTINUED] prednisoLONE acetate (PRED FORTE) 1 % DrpS Place 1 drop into the left eye 2 (two) times daily. (Patient not taking: Reported on 5/7/2024)      [DISCONTINUED] valsartan-hydrochlorothiazide (DIOVAN-HCT) 160-12.5 mg per tablet 1 tablet Orally Once a day 90 tablet 0     No facility-administered encounter medications on file as of 5/24/2024.     Orders Placed This Encounter   Procedures    CT Chest Without Contrast     Standing Status:   Future     Standing Expiration Date:   5/24/2025     Order Specific Question:   May the Radiologist modify the order per protocol to meet the clinical needs of the patient?     Answer:   Yes     Order Specific Question:   Access port per protocol?     Answer:   No       Plan:       Problem List Items Addressed This Visit          Pulmonary    Chronic obstructive pulmonary disease      Patient has COPD which is currently stable         Lung mass      Patient has 3 pulmonary nodules unchanged over the last 6 months repeat CT in 6 months          Other Visit Diagnoses       Localized swelling, mass and lump, trunk    -  Primary    Relevant Orders    CT Chest Without Contrast

## 2024-05-24 NOTE — TELEPHONE ENCOUNTER
Pt notified that xray showed OA of the AC joint and that Dr Ballesteros recommended ortho eval if he was interested, he agreed to ortho eval, referral has been placed.

## 2024-05-24 NOTE — TELEPHONE ENCOUNTER
----- Message from Vishnu Ballesteros MD sent at 5/23/2024 12:07 PM CDT -----  Pos for OA of the AC joint, can refer to Ortho if her wants. This is not the source of his pain.

## 2024-05-30 ENCOUNTER — OFFICE VISIT (OUTPATIENT)
Dept: ORTHOPEDICS | Facility: CLINIC | Age: 69
End: 2024-05-30
Payer: COMMERCIAL

## 2024-05-30 ENCOUNTER — HOSPITAL ENCOUNTER (OUTPATIENT)
Dept: RADIOLOGY | Facility: HOSPITAL | Age: 69
Discharge: HOME | End: 2024-05-30
Attending: ORTHOPAEDIC SURGERY
Payer: COMMERCIAL

## 2024-05-30 DIAGNOSIS — S46.219A BICEPS TENDON TEAR: ICD-10-CM

## 2024-05-30 DIAGNOSIS — M25.521 RIGHT ELBOW PAIN: Primary | ICD-10-CM

## 2024-05-30 DIAGNOSIS — M19.011 OSTEOARTHRITIS OF RIGHT SHOULDER, UNSPECIFIED OSTEOARTHRITIS TYPE: ICD-10-CM

## 2024-05-30 DIAGNOSIS — M25.521 RIGHT ELBOW PAIN: ICD-10-CM

## 2024-05-30 PROCEDURE — 99214 OFFICE O/P EST MOD 30 MIN: CPT | Mod: PBBFAC,25 | Performed by: ORTHOPAEDIC SURGERY

## 2024-05-30 PROCEDURE — 99204 OFFICE O/P NEW MOD 45 MIN: CPT | Mod: S$PBB,,, | Performed by: ORTHOPAEDIC SURGERY

## 2024-05-30 PROCEDURE — 73070 X-RAY EXAM OF ELBOW: CPT | Mod: 26,RT,, | Performed by: ORTHOPAEDIC SURGERY

## 2024-05-30 PROCEDURE — 73070 X-RAY EXAM OF ELBOW: CPT | Mod: TC,RT

## 2024-05-30 NOTE — PROGRESS NOTES
HPI:   Mikhail Stanford is a pleasant 69 y.o. patient who reports to clinic for evaluation of right elbow/arm pain.     Injury onset and description: pain has been present for about a month. He ws moving furniture and felt a pop in his elbow.   Patient's occupation: retired  This is not a work related injury.   he has not had formal physical therapy  he has not had previous shoulder injections.   he has not had advanced imaging such as MRI.   The shoulder pain worsens with activity and overhead motion. Pain is disruptive to sleep at night.   The pain is better with rest. Treatment thus far has included rest and activity modification.   Here today to discuss diagnosis and treatment options.   VAS Pain Scale:  2  SANE Score:     PAST MEDICAL HISTORY:   Past Medical History:   Diagnosis Date    COPD (chronic obstructive pulmonary disease)     Hypertension     Sleep apnea      PAST SURGICAL HISTORY:   Past Surgical History:   Procedure Laterality Date    DENTAL SURGERY      EXCISION OF LESION OF LIP N/A 02/21/2023    Procedure: EXCISION, LESION, LIP;  Surgeon: Kuldeep Yao MD;  Location: [facility] OR;  Service: ENT;  Laterality: N/A;    HERNIA REPAIR  11/24/2020    LEFT HEART CATHETERIZATION N/A 7/10/2023    Procedure: Left heart cath;  Surgeon: Jeremias Simmons MD;  Location: [facility] CATH LAB;  Service: Cardiology;  Laterality: N/A;     MEDICATIONS:    Current Outpatient Medications:     albuterol-budesonide (AIRSUPRA) 90-80 mcg/actuation, Use 2 puffs every 6 hours as needed for shortness a breath, Disp: 21.14 g, Rfl: 11    atorvastatin (LIPITOR) 40 MG tablet, TAKE 1 TABLET DAILY, Disp: 90 tablet, Rfl: 0    brimonidine 0.2% (ALPHAGAN) 0.2 % Drop, Place 1 drop into the left eye 2 (two) times a day., Disp: , Rfl:     chlorhexidine (PERIDEX) 0.12 % solution, 5 mLs 3 (three) times daily., Disp: , Rfl:     dapagliflozin propanediol (FARXIGA) 10 mg tablet, Take 1 tablet (10 mg total) by mouth once daily.,  Disp: 90 tablet, Rfl: 3    esomeprazole (NEXIUM) 40 MG capsule, TAKE 1 CAPSULE BY MOUTH EVERY DAY, Disp: 90 capsule, Rfl: 1    ezetimibe (ZETIA) 10 mg tablet, TAKE 1 TABLET DAILY, Disp: 90 tablet, Rfl: 0    fluticasone propionate (FLONASE) 50 mcg/actuation nasal spray, 2 sprays (100 mcg total) by Each Nostril route once daily., Disp: 48 g, Rfl: 1    tadalafiL (CIALIS) 20 MG Tab, Take 1 tablet (20 mg total) by mouth daily as needed (intercourse)., Disp: 24 tablet, Rfl: 3    tamsulosin (FLOMAX) 0.4 mg Cap, Take 1 capsule (0.4 mg total) by mouth once daily. (Patient taking differently: Take 0.4 mg by mouth once daily. Take twice daily), Disp: 90 capsule, Rfl: 1    valsartan-hydrochlorothiazide (DIOVAN-HCT) 160-12.5 mg per tablet, 1 tablet Orally Once a day, Disp: 90 tablet, Rfl: 1  ALLERGIES:   Review of patient's allergies indicates:  No Known Allergies  REVIEW OF SYSTEMS:  Constitution: Negative. Negative for chills, fever and night sweats. HENT: Negative for congestion and headaches.  Eyes: Negative for blurred vision, left vision loss and right vision loss. Cardiovascular: Negative for chest pain and syncope. Respiratory: Negative for cough and shortness of breath.       PHYSICAL EXAM:  VITAL SIGNS: There were no vitals taken for this visit.  General: Well-developed well-nourished 69 y.o. malein no acute distress;Cardiovascular: Regular rhythm by palpation of distal pulse, normal color and temperature, no concerning varicosities on symptomatic side Lungs: No labored breathing or wheezing appreciated Neuro: Alert and oriented ×3 Psychiatric: well oriented to person, place and time, demonstrates normal mood and affect Skin: No rashes, lesions or ulcers, normal temperature, turgor, and texture on uninvolved extremity    Ortho/SPM Exam  + hook test right elbow.   Full active and passive range of motion of the right elbow was seen there has tenderness within the antecubital fossa there is pain radiating around the  brachial radialis there is satisfactory range of motion of the right shoulder he is neurovascularly intact.  Mild tenderness over the AC joint cuff strength appears to be satisfactory.      IMAGING:       X-Ray Elbow 2 Views Right    Result Date: 5/30/2024  See Procedure Notes for results. IMPRESSION: Please see Ortho procedure notes for report.  This procedure was auto-finalized by: Virtual Radiologist  Two views right elbow were obtained today demonstrating the presence of mild degenerative changes in the elbow there is a small enthesophyte seen of the olecranon.  No fracture or pathologic bone otherwise seen.  X-ray Shoulder 2 or More Views Right    Result Date: 5/23/2024  EXAMINATION: XR SHOULDER COMPLETE 2 OR MORE VIEWS RIGHT CLINICAL HISTORY: Pain in right arm TECHNIQUE: Right shoulder, internal and external rotation views: COMPARISON: None. FINDINGS: There is osteoarthritis involving the AC joint.  The glenohumeral joint is normal.  No soft tissue calcifications or other abnormalities are seen.     There is osteoarthritis involving the AC joint. Place of service: [facility] Electronically signed by: Sheila Mireles Date:    05/23/2024 Time:    11:33        11:59       ASSESSMENT:      ICD-10-CM ICD-9-CM   1. Right elbow pain  M25.521 719.42   2. Osteoarthritis of right shoulder, unspecified osteoarthritis type  M19.011 715.91   3. Biceps tendon tear  S46.219A 840.8       PLAN:     -Findings and treatment options were discussed with the patient  -All questions answered  Based on the injury mechanism sounds like he had injury to his biceps tendon distally.  I would like to obtain an MRI to rule out the presence of a full-thickness there was a partial-thickness tear of the biceps.  He voices understanding with the treatment plan will see back as soon as his MRI is complete.    There are no Patient Instructions on file for this visit.  Orders Placed This Encounter   Procedures    X-Ray Elbow 2 Views  Right     Standing Status:   Future     Number of Occurrences:   1     Standing Expiration Date:   5/30/2025     Order Specific Question:   May the Radiologist modify the order per protocol to meet the clinical needs of the patient?     Answer:   Yes     Order Specific Question:   Release to patient     Answer:   Immediate    MRI Elbow Joint Without Contrast Right     Standing Status:   Future     Standing Expiration Date:   5/30/2025     Order Specific Question:   Does the patient have or ever had a pacemaker or a defibrillator (Note: Some facilities may not be able to schedule an MRI for patients with pacemakers and defibrillators. You should contact your local radiology dept to determine if this is the case.)?     Answer:   No     Order Specific Question:   Does the patient have an aneurysm or surgical clip, pump, nerve/brain stimulator, middle/inner ear prosthesis, or other metal implant or foreign object (bullet, shrapnel)? If they have a card related to their implant, ask them to bring it. Issues related to the implant may cause the MRI to be delayed.     Answer:   No     Order Specific Question:   Will the patient require sedation?     Answer:   No     Order Specific Question:   May the Radiologist modify the order per protocol to meet the clinical needs of the patient?     Answer:   Yes     Order Specific Question:   Does the patient have on a skin patch for medication with aluminized backing?     Answer:   No     Procedures

## 2024-06-03 ENCOUNTER — TELEPHONE (OUTPATIENT)
Dept: ORTHOPEDICS | Facility: CLINIC | Age: 69
End: 2024-06-03
Payer: COMMERCIAL

## 2024-06-03 NOTE — TELEPHONE ENCOUNTER
----- Message from Sujey Hooper sent at 6/3/2024 10:59 AM CDT -----  Who Called: Mikhail Stanford    Caller is requesting assistance/information from provider's office.      Preferred Method of Contact: Phone Call  Patient's Preferred Phone Number on File: [phone]   Best Call Back Number, if different:  Additional Information: crystal with imaging center [facility] calling to speak with nurse regarding clinicals they are waiting on - states pt is scheduled for 6/10 - needing to go ahead and get now so they can pre-cert pt  - call back # [phone]

## 2024-06-11 ENCOUNTER — OFFICE VISIT (OUTPATIENT)
Dept: ORTHOPEDICS | Facility: CLINIC | Age: 69
End: 2024-06-11
Payer: COMMERCIAL

## 2024-06-11 DIAGNOSIS — S56.511A PARTIAL TEAR OF COMMON EXTENSOR TENDON OF RIGHT ELBOW: Primary | ICD-10-CM

## 2024-06-11 DIAGNOSIS — Z01.818 PREPROCEDURAL EXAMINATION: Primary | ICD-10-CM

## 2024-06-11 DIAGNOSIS — S46.211A RUPTURE OF RIGHT DISTAL BICEPS TENDON, INITIAL ENCOUNTER: ICD-10-CM

## 2024-06-11 PROCEDURE — 99213 OFFICE O/P EST LOW 20 MIN: CPT | Mod: PBBFAC | Performed by: ORTHOPAEDIC SURGERY

## 2024-06-11 PROCEDURE — 99214 OFFICE O/P EST MOD 30 MIN: CPT | Mod: S$PBB,,, | Performed by: ORTHOPAEDIC SURGERY

## 2024-06-11 RX ORDER — MUPIROCIN 20 MG/G
OINTMENT TOPICAL
OUTPATIENT
Start: 2024-06-11

## 2024-06-11 RX ORDER — SODIUM CHLORIDE 9 MG/ML
INJECTION, SOLUTION INTRAVENOUS CONTINUOUS
OUTPATIENT
Start: 2024-06-11

## 2024-06-11 RX ORDER — CEFAZOLIN SODIUM 2 G/50ML
2 SOLUTION INTRAVENOUS
OUTPATIENT
Start: 2024-06-11

## 2024-06-11 NOTE — PROGRESS NOTES
69 y.o. Male returns to clinic for a follow up visit regarding     ICD-10-CM ICD-9-CM   1. Partial tear of common extensor tendon of right elbow  S56.511A 841.8   2. Rupture of right distal biceps tendon, initial encounter  S46.211A 840.8        Here today for MRI follow-up.  Complains of significant pain and dysfunction.       Past Medical History:   Diagnosis Date    COPD (chronic obstructive pulmonary disease)     Hypertension     Sleep apnea      Past Surgical History:   Procedure Laterality Date    DENTAL SURGERY      EXCISION OF LESION OF LIP N/A 02/21/2023    Procedure: EXCISION, LESION, LIP;  Surgeon: Kuldeep Yao MD;  Location: [facility] OR;  Service: ENT;  Laterality: N/A;    HERNIA REPAIR  11/24/2020    LEFT HEART CATHETERIZATION N/A 7/10/2023    Procedure: Left heart cath;  Surgeon: Jeremias Simmons MD;  [facility] CATH LAB;  Service: Cardiology;  Laterality: N/A;         PHYSICAL EXAMINATION:    General    Nursing note and vitals reviewed.  Constitutional: He is oriented to person, place, and time. He appears well-developed and well-nourished.   HENT:   Head: Normocephalic and atraumatic.   Nose: Nose normal.   Eyes: EOM are normal. Pupils are equal, round, and reactive to light.   Neck: Neck supple.   Cardiovascular:  Normal rate and intact distal pulses.            Pulmonary/Chest: Effort normal. No respiratory distress. He exhibits no tenderness.   Abdominal: Soft. He exhibits no distension. There is no abdominal tenderness.   Neurological: He is alert and oriented to person, place, and time. He has normal reflexes.   Psychiatric: He has a normal mood and affect. His behavior is normal. Judgment and thought content normal.             Right Hand/Wrist Exam     Inspection   Scars: Wrist - absent     Range of Motion     Wrist   Extension:  normal   Flexion:  normal     Other     Neuorologic Exam    Median Distribution: normal  Ulnar Distribution: normal  Radial Distribution:  normal      Right Elbow Exam     Inspection   Scars: absent  Bruising: absent  Atrophy: absent    Pain   The patient exhibits pain of the extensor musculature and lateral epicondyle    Range of Motion   Extension:  normal   Flexion:  normal   Pronation:  normal   Supination:  normal     Tests   Varus: negative  Valgus: negative  Tennis Elbow: severe  Golfer's Elbow: negative  Radial Capitellar Grind: negative    Other   Sensation: normal          Muscle Strength   Right Upper Extremity   Wrist extension: 4/5   Wrist flexion: 5/5   Elbow Pronation:  5/5   Elbow Supination:  5/5   Elbow Extension: 5/5  Elbow Flexion: 5/5    Hook test performed today.  This is equivocal.  Limited biceps strength is present.  Limited supination strength is demonstrated today.  No deformity or masses present along the distal biceps muscle belly    IMAGING:  MRI from the imaging center was reviewed today of the right elbow.  This demonstrates the presence of a full-thickness tear of the biceps insertion on the radius as well as a full-thickness tear of the common extensor tendon off the humeral epicondyle.  ASSESSMENT:      ICD-10-CM ICD-9-CM   1. Partial tear of common extensor tendon of right elbow  S56.511A 841.8   2. Rupture of right distal biceps tendon, initial encounter  S46.211A 840.8       PLAN:     -Findings and treatment options were discussed with the patient  -All questions answered      He is complaining of functional limitations in his right elbow as well as tremendous pain.  He feels terribly weak.  Per my read he has acomplete tear of his biceps tendon in a near complete tear of the common extensor tendon of the right elbow.  The biceps certainly  require stabilization in his common extensor tendon is likely torn such a degree this will require surgery as well.  Think it is reasonable therefore to consider fixation of both of these tendon tears at the same time in order to have an optimal recovery optimal function.  Will  plan for right open biceps tendon repair as well as repair of the common extensor tendon.  Risks benefits alternatives were discussed.    There are no Patient Instructions on file for this visit.      No orders of the defined types were placed in this encounter.        Procedures

## 2024-06-18 ENCOUNTER — OFFICE VISIT (OUTPATIENT)
Dept: NEPHROLOGY | Facility: CLINIC | Age: 69
End: 2024-06-18
Payer: COMMERCIAL

## 2024-06-18 VITALS
SYSTOLIC BLOOD PRESSURE: 136 MMHG | RESPIRATION RATE: 18 BRPM | OXYGEN SATURATION: 96 % | DIASTOLIC BLOOD PRESSURE: 82 MMHG | BODY MASS INDEX: 32.65 KG/M2 | HEIGHT: 71 IN | WEIGHT: 233.19 LBS | HEART RATE: 91 BPM

## 2024-06-18 DIAGNOSIS — N18.31 STAGE 3A CHRONIC KIDNEY DISEASE: Primary | ICD-10-CM

## 2024-06-18 PROCEDURE — 99999 PR PBB SHADOW E&M-EST. PATIENT-LVL IV: CPT | Mod: PBBFAC,,, | Performed by: NURSE PRACTITIONER

## 2024-06-18 PROCEDURE — 99214 OFFICE O/P EST MOD 30 MIN: CPT | Mod: PBBFAC | Performed by: NURSE PRACTITIONER

## 2024-06-18 PROCEDURE — 99213 OFFICE O/P EST LOW 20 MIN: CPT | Mod: S$PBB,,, | Performed by: NURSE PRACTITIONER

## 2024-06-18 NOTE — PROGRESS NOTES
" [facility] History and Physical  Patient Name: Mikhail Stanford  MRN: [id number]  Age: 69 y.o.  : 1955    Date: 2024 3:01 PM    Chief Complaint: Follow Up    HPI :   Mr Stanford presents to Nephrology clinic for follow up. He is followed by Dr. Vishnu Ballesteros MD as his primary care provider.     HTN: diagnosed over 20 years. Current regimen includes valsartan-hydrochlorothiazide 160-12.5 mg daily     DLD: On statin, zetia     Hepatitis: treated in the past unsure which kind.     Nephrology history: No FH of kidney disease, no nephrolithiasis, or recurrent UTIs. No OTC medications including NSAIDs. He no longer taking aleve.  He reports very foamy frothy urine.     Patient denies any CP, SOB, peripheral edema, dysuria, hematuria, changes in urinary habits, or increased frequency of urination.     Past Medical History:  has a past medical history of COPD (chronic obstructive pulmonary disease), Hypertension, and Sleep apnea.     Past Surgical History:   has a past surgical history that includes Dental surgery; Excision of lesion of lip (N/A, 2023); Hernia repair (2020); Left heart catheterization (N/A, 07/10/2023); and Cataract extraction (Bilateral, 2024).     Family History:  family history includes Cancer in his mother; Diabetes in his sister; Hyperlipidemia in his sister. No family history of kidney disease.     Social History:   reports that he quit smoking about 18 years ago. His smoking use included cigarettes. He started smoking about 58 years ago. He has a 80 pack-year smoking history. He has been exposed to tobacco smoke. He quit smokeless tobacco use about 18 years ago.  His smokeless tobacco use included chew. He reports that he does not currently use alcohol. He reports that he does not currently use drugs after having used the following drugs: Marijuana, "Crack" cocaine, and Heroin. Has two twin sons that are 15. He quit drugs " 30 years ago. He raised his sons solo he says. He is now retired.    Allergies: has No Known Allergies.     Medications: Reviewed including OTC medications, herbal supplements, and NSAIDS.     Old records have been reviewed.      Review of Systems:  ROS: A 10 point ROS was completed and found to be negative except for that mentioned above.          Physical Exam:  Vitals:    06/18/24 1451   BP: 136/82   Pulse: 91   Resp: 18         Constitutional: sitting in chair, in NAD  Eyes: EOMI, white sclera  ENMT: moist mucus membranes, nares patent  Cardiovascular: normal rate, S1/S2 noted, no edema  Respiratory: symmetrical chest expansion, CTA-B  Gastrointestinal: +BS, soft, NT/ND  Musculoskeletal: normal, no joint erythema/effusions  Skin: no rash, no purpura, warm extremities  Neurological: Alert and Oriented x 4, afocal    Labs:   Lab Results   Component Value Date     03/07/2024    K 5.3 (H) 03/07/2024    CREATININE 1.68 (H) 03/07/2024    ALT 43 03/07/2024    AST 25 03/07/2024    HGBA1C 6.2 04/21/2023    LDLCALC 73 03/07/2024    CHOL 149 03/07/2024    HDL 63 (H) 03/07/2024    TRIG 65 03/07/2024    TSH 0.598 03/07/2024    WBC 4.27 (L) 03/07/2024    HGB 15.6 03/07/2024    HCT 48.0 03/07/2024     03/07/2024    PSA 0.554 04/18/2024        Otherwise Reviewed    Assessment/Plan:       CKD stage IIIa in setting of suspected hypertensive nephrosclerosis.  Baseline sCr 1.5, today sCr pending. Counseled to avoid nephrotoxic agents such as NSAIDs.     Baseline renal ultrasound showed simple cyst; He had IgG Kappa Monoclonal Gammopathy, renal biopsy was obtained to rule out monoclonal gammopathy of renal significance and resulted as arterionephrosclerosis r/t HTN.     Proteinuria: urine Prot:Creat ratio is pending. Patient is on RAAS blockade with ARB. He had an abnormal UPEP. I will repeat today. Due to CKD, elevated proteinuria, I think he would benefit from Farxiga to help slow proteinuria. Taking Farxiga with no  issues.     Anemia: CBC today     BMD: Calcium and Phosphorus PTH Vit D pending    HTN: well controlled with current meds     RTC 8 months with CBC, RFP, UA, urine for Prot:creat ratio, PTH, Vit D      Signature:             ANJU Herrera  [facility]  [address] [facility] [place] [address]  [phone]        23 minutes of total time spent on the encounter, which includes face to face time and non-face to face time preparing to see the patient (eg, review of tests), Obtaining and/or reviewing separately obtained history, Documenting clinical information in the electronic or other health record, Independently interpreting results (not separately reported) and communicating results to the patient/family/caregiver, or Care coordination (not separately reported).       Date of encounter: 6/18/24

## 2024-06-19 DIAGNOSIS — J30.9 ALLERGIC RHINITIS, UNSPECIFIED SEASONALITY, UNSPECIFIED TRIGGER: ICD-10-CM

## 2024-06-19 RX ORDER — FLUTICASONE PROPIONATE 50 MCG
2 SPRAY, SUSPENSION (ML) NASAL
Qty: 48 G | Refills: 1 | Status: SHIPPED | OUTPATIENT
Start: 2024-06-19

## 2024-06-25 ENCOUNTER — TELEPHONE (OUTPATIENT)
Dept: FAMILY MEDICINE | Facility: CLINIC | Age: 69
End: 2024-06-25
Payer: COMMERCIAL

## 2024-06-25 NOTE — TELEPHONE ENCOUNTER
Patient contacted clinic requesting refill on flomax, however, he is est with [facility] and he was advised to contact his clinic for refills since he has changed the way patient takes medication. He voiced understanding and agreed to contact urology for further refills.

## 2024-06-25 NOTE — TELEPHONE ENCOUNTER
----- Message from Maya Clark MA sent at 6/25/2024 12:30 PM CDT -----  Please give pt a call concerning his medication.

## 2024-06-26 ENCOUNTER — TELEPHONE (OUTPATIENT)
Dept: UROLOGY | Facility: CLINIC | Age: 69
End: 2024-06-26
Payer: COMMERCIAL

## 2024-06-26 DIAGNOSIS — R39.14 BENIGN PROSTATIC HYPERPLASIA WITH INCOMPLETE BLADDER EMPTYING: Primary | ICD-10-CM

## 2024-06-26 DIAGNOSIS — N40.1 BENIGN PROSTATIC HYPERPLASIA WITH INCOMPLETE BLADDER EMPTYING: Primary | ICD-10-CM

## 2024-06-26 RX ORDER — TAMSULOSIN HYDROCHLORIDE 0.4 MG/1
CAPSULE ORAL
Qty: 180 CAPSULE | Refills: 3 | Status: SHIPPED | OUTPATIENT
Start: 2024-06-26

## 2024-06-26 NOTE — TELEPHONE ENCOUNTER
----- Message from Ita Morales sent at 6/26/2024 11:12 AM CDT -----  Regarding: REFILL  Who Called: Mikhail Stanford    Refill or New Rx:Refill  RX Name and Strength:TAMSULOSIN  How is the patient currently taking it? (ex. 1XDay):TWICE A DAY  Is this a 30 day or 90 day RX:90 DAY  Local or Mail Order:LOCAL  List of preferred pharmacies on file (remove unneeded): [unfilled]  If different Pharmacy is requested, enter Pharmacy information here including location and phone number: [facility]   Ordering Provider:BLANCA      Preferred Method of Contact: Phone Call  Patient's Preferred Phone Number on File: [phone]   Best Call Back Number, if different:  Additional Information: PATIENT WAS INSTRUCTED TO DOUBLE UP ON HIS TAMSULOSIN AND NOW HE HAS RUN OUT.  I called pt back and told him [facility]illo has sent in his Tamsulosin Rx for him to take it BID.

## 2024-06-26 NOTE — TELEPHONE ENCOUNTER
I called pt back based on message from call center.  Pt says he is running out of flomax.  Has 4 pills left, and drug store will not refill the Rx that Dr Ballesteros had him on because it is once a day and because he had to start taking them BID, he is down to 4 pills.  And his insurance would not pay for the refill early.  He needs a new Rx from [facility] Castillo for BID.  Says he is tolerating BID fine.  Says he will take one per day of the 4 pills left, until you can send in new Rx.  I will call him back and let him know something.

## 2024-06-27 DIAGNOSIS — N18.31 STAGE 3A CHRONIC KIDNEY DISEASE: ICD-10-CM

## 2024-06-27 RX ORDER — DAPAGLIFLOZIN 10 MG/1
10 TABLET, FILM COATED ORAL
Qty: 90 TABLET | Refills: 3 | Status: SHIPPED | OUTPATIENT
Start: 2024-06-27

## 2024-07-03 ENCOUNTER — TELEPHONE (OUTPATIENT)
Dept: ORTHOPEDICS | Facility: CLINIC | Age: 69
End: 2024-07-03
Payer: MEDICARE

## 2024-07-03 NOTE — TELEPHONE ENCOUNTER
----- Message from Ita Morales sent at 7/3/2024  9:33 AM CDT -----  Regarding: RESCHEDULING SURGERY  Who Called: Mikhail Stanford        Who Left Message for Patient:  Does the patient know what this is regarding?:RESCHEDULING SURGERY      Preferred Method of Contact: Phone Call  Patient's Preferred Phone Number on File: [phone]   Best Call Back Number, if different:  Additional Information: WOULD LIKE TO RESCHEDULE HIS SURGERY TO A MONDAY IF POSSIBLE. THIS IS THE ONLY DAY IS WIFE IS OFF.

## 2024-07-07 NOTE — PROGRESS NOTES
Subjective     Patient ID: Mikhail Stanford is a 69 y.o. male.    Chief Complaint: No chief complaint on file.    This pleasant 69 year old male presents to the clinic as a new patient referral from Dr. DAVID Ballesteros for Enlarged Prostate.  Patient states symptoms started about one year ago.  His IPSS score is 27 that reflects incomplete bladder emptying, frequency, intermittency, urgency, weak stream, and nocturia 5 times a night.  His PVR is 033 mls.  He denies dysuria, hematuria or straining to urinate.   He denies fever, chills, nausea or vomiting.  He denies abdominal, bladder or back pain.  He denies any family history of prostate cancer.  He denies smoking or drinking alcohol.  His PSA's are WNL.  His urine culture on April 8, 2024 was negative.  He was started on Flomax 0.4 mg about 3 weeks ago.  I discussed giving the medication more time to work.  He will continue the Flomax 0.4 mg one at bedtime for another week or two and if he tolerates the medication he will increase the Flomax to one twice a day.  If he cannot tolerate the increase he will go back to one Flomax at bedtime.  We discussed if no improvement at the next visit then we will consider adding Finasteride versus the BPH work up for possible TURP.  I will see him back in the clinic in 2 months or sooner if needed.  I discussed the plan in detail with Urologist Dr. DENISA Parson and the patient and they are in agreement with the plan.  All his questions were answered at today's visit. I spent 30 minutes counseling this patient.        PSA History:   PSA was 0.554 on 04/18/2024  PSA was 0.603 on 01/31/2023  PSA was 0.415 on 10/04/2021   -----------------------------------------------------------------------------------------------------------  [May 7, 2024].            This pleasant 69 year old male presents to the clinic for follow up of BPH with LUTS.  Patient states he is still not pleased with the way he voids. Patient states symptoms started about one  year ago. His Flomax 0.4 mg was increased to one twice a day at the last visit.  He is tolerating this medication without side effects.  His IPSS score improved from 27 to 13 that reflects incomplete bladder emptying, frequency, intermittency, and nocturia 4 times a night.  His PVR is 008 mls.  He denies dysuria, hematuria, weak stream, urgency or straining to urinate.   He denies fever, chills, nausea or vomiting.  He denies abdominal, bladder or back pain.  He denies any family history of prostate cancer.  He denies smoking or drinking alcohol.  His PSA's are WNL.  His urine culture on April 8, 2024 was negative.   We discussed adding Finasteride versus the BPH work up for possible TURP.  He desires to do the BPH work up with DUNG and Cystoscopy and desires not to start the Finasteride.  This will be scheduled as outlined in the plan.  I will reculture his urine and treat if indicated. He will continue the Flomax 0.4 mg one twice a day.    I discussed the plan in detail with Urologist Dr. DENISA Parson and the patient and they are in agreement with the plan.  All his questions were answered at today's visit.         PSA History:   PSA was 0.554 on 04/18/2024  PSA was 0.603 on 01/31/2023  PSA was 0.415 on 10/04/2021   ---------------------------------------------------------------------------------------------------  [July 9, 2024].                    Review of Systems   Constitutional:  Negative for activity change and fever.   HENT:  Negative for hearing loss and trouble swallowing.    Eyes:  Negative for visual disturbance.   Respiratory:  Negative for cough, shortness of breath and wheezing.    Cardiovascular:  Negative for chest pain.   Gastrointestinal:  Negative for abdominal pain, diarrhea, nausea and vomiting.   Endocrine: Negative for polyuria.   Genitourinary:  Positive for frequency. Negative for bladder incontinence, decreased urine volume, difficulty urinating, discharge, dysuria, enuresis, erectile  dysfunction, flank pain, genital sores, hematuria, penile pain, penile swelling, scrotal swelling, testicular pain and urgency.        BPH with LUTS        Weak stream        Nocturia    Musculoskeletal:  Negative for back pain and gait problem.   Integumentary:  Negative for rash.   Neurological:  Negative for speech difficulty and weakness.   Psychiatric/Behavioral:  Negative for behavioral problems and confusion.           Objective     Physical Exam  Vitals and nursing note reviewed.   Constitutional:       General: He is not in acute distress.     Appearance: Normal appearance. He is not ill-appearing, toxic-appearing or diaphoretic.   HENT:      Head: Normocephalic.   Eyes:      Extraocular Movements: Extraocular movements intact.   Cardiovascular:      Rate and Rhythm: Normal rate and regular rhythm.      Heart sounds: Normal heart sounds.   Pulmonary:      Effort: Pulmonary effort is normal. No respiratory distress.      Breath sounds: Normal breath sounds. No wheezing, rhonchi or rales.   Abdominal:      General: Bowel sounds are normal.      Palpations: Abdomen is soft.      Tenderness: There is no abdominal tenderness. There is no right CVA tenderness, left CVA tenderness, guarding or rebound.   Musculoskeletal:         General: Normal range of motion.      Cervical back: Normal range of motion. No rigidity.   Skin:     General: Skin is warm and dry.   Neurological:      General: No focal deficit present.      Mental Status: He is alert and oriented to person, place, and time.      Motor: No weakness.      Coordination: Coordination normal.      Gait: Gait normal.   Psychiatric:         Mood and Affect: Mood normal.         Behavior: Behavior normal.         Thought Content: Thought content normal.          Assessment and Plan     Problem List Items Addressed This Visit          Renal/    Benign prostatic hyperplasia with incomplete bladder emptying - Primary    Relevant Orders    US Kidney    Nocturia     Relevant Orders    Urine culture    Frequency of urination    Relevant Orders    Urine culture    Weak urinary stream        Continue Tamsulosin (Flomax) 0.4 mg one capsule twice a day   DUNG on July 18, 2024 at 1:30 pm to evaluate BPH for possible TURP (do NOT go home after DUNG, come to Dr. Parson's office for cystoscopy)   Cystoscopy with Dr. Parson on July 18, 2024 at 2:15 pm to evaluate BPH for possible TURP   Urine culture   Follow up with Urology to be determined after BPH work up

## 2024-07-09 ENCOUNTER — OFFICE VISIT (OUTPATIENT)
Dept: UROLOGY | Facility: CLINIC | Age: 69
End: 2024-07-09
Payer: MEDICARE

## 2024-07-09 VITALS
HEIGHT: 71 IN | BODY MASS INDEX: 33.18 KG/M2 | OXYGEN SATURATION: 95 % | DIASTOLIC BLOOD PRESSURE: 70 MMHG | WEIGHT: 237 LBS | HEART RATE: 99 BPM | SYSTOLIC BLOOD PRESSURE: 112 MMHG | TEMPERATURE: 98 F

## 2024-07-09 DIAGNOSIS — R35.1 NOCTURIA: ICD-10-CM

## 2024-07-09 DIAGNOSIS — R39.14 BENIGN PROSTATIC HYPERPLASIA WITH INCOMPLETE BLADDER EMPTYING: Primary | ICD-10-CM

## 2024-07-09 DIAGNOSIS — R39.12 WEAK URINARY STREAM: ICD-10-CM

## 2024-07-09 DIAGNOSIS — N40.1 BENIGN PROSTATIC HYPERPLASIA WITH INCOMPLETE BLADDER EMPTYING: Primary | ICD-10-CM

## 2024-07-09 DIAGNOSIS — R35.0 FREQUENCY OF URINATION: ICD-10-CM

## 2024-07-09 PROCEDURE — 99213 OFFICE O/P EST LOW 20 MIN: CPT | Mod: S$PBB,,, | Performed by: NURSE PRACTITIONER

## 2024-07-09 PROCEDURE — 99215 OFFICE O/P EST HI 40 MIN: CPT | Mod: PBBFAC | Performed by: NURSE PRACTITIONER

## 2024-07-09 PROCEDURE — 87086 URINE CULTURE/COLONY COUNT: CPT | Mod: ,,, | Performed by: CLINICAL MEDICAL LABORATORY

## 2024-07-09 PROCEDURE — 99999 PR PBB SHADOW E&M-EST. PATIENT-LVL V: CPT | Mod: PBBFAC,,, | Performed by: NURSE PRACTITIONER

## 2024-07-09 RX ORDER — BUDESONIDE, GLYCOPYRROLATE, AND FORMOTEROL FUMARATE 160; 9; 4.8 UG/1; UG/1; UG/1
AEROSOL, METERED RESPIRATORY (INHALATION)
COMMUNITY
Start: 2024-07-03

## 2024-07-09 RX ORDER — TIOTROPIUM BROMIDE 18 UG/1
1 CAPSULE ORAL; RESPIRATORY (INHALATION)
COMMUNITY
Start: 2024-07-03

## 2024-07-09 NOTE — PATIENT INSTRUCTIONS
Continue Tamsulosin (Flomax) 0.4 mg one capsule twice a day   DUNG on July 18, 2024 at 1:30 pm to evaluate BPH for possible TURP (do NOT go home after DUNG, come to Dr. Parson's office for cystoscopy)   Cystoscopy with Dr. Parson on July 18, 2024 at 2:15 pm to evaluate BPH for possible TURP   Urine culture   Follow up with Urology to be determined after BPH work up

## 2024-07-11 ENCOUNTER — TELEPHONE (OUTPATIENT)
Dept: UROLOGY | Facility: CLINIC | Age: 69
End: 2024-07-11
Payer: COMMERCIAL

## 2024-07-11 LAB — UA COMPLETE W REFLEX CULTURE PNL UR: NORMAL

## 2024-07-11 NOTE — TELEPHONE ENCOUNTER
----- Message from Ceferino Castillo NP sent at 7/11/2024  7:57 AM CDT -----  Please let patient know his urine culture was negative, thanks   I called pt and spoke with him and relayed the above message to him.  He voiced understanding.

## 2024-07-15 ENCOUNTER — OFFICE VISIT (OUTPATIENT)
Dept: FAMILY MEDICINE | Facility: CLINIC | Age: 69
End: 2024-07-15
Payer: COMMERCIAL

## 2024-07-15 VITALS
HEIGHT: 71 IN | BODY MASS INDEX: 32.45 KG/M2 | SYSTOLIC BLOOD PRESSURE: 122 MMHG | DIASTOLIC BLOOD PRESSURE: 80 MMHG | RESPIRATION RATE: 18 BRPM | TEMPERATURE: 98 F | HEART RATE: 75 BPM | OXYGEN SATURATION: 96 % | WEIGHT: 231.81 LBS

## 2024-07-15 DIAGNOSIS — J44.9 CHRONIC OBSTRUCTIVE PULMONARY DISEASE, UNSPECIFIED COPD TYPE: Primary | ICD-10-CM

## 2024-07-15 DIAGNOSIS — Z12.11 SCREEN FOR COLON CANCER: Primary | ICD-10-CM

## 2024-07-15 PROCEDURE — 99213 OFFICE O/P EST LOW 20 MIN: CPT | Mod: ,,, | Performed by: NURSE PRACTITIONER

## 2024-07-15 RX ORDER — POLYETHYLENE GLYCOL 3350, SODIUM SULFATE ANHYDROUS, SODIUM BICARBONATE, SODIUM CHLORIDE, POTASSIUM CHLORIDE 236; 22.74; 6.74; 5.86; 2.97 G/4L; G/4L; G/4L; G/4L; G/4L
4 POWDER, FOR SOLUTION ORAL ONCE
Qty: 4000 ML | Refills: 0 | Status: SHIPPED | OUTPATIENT
Start: 2024-07-15 | End: 2024-07-15

## 2024-07-15 NOTE — PROGRESS NOTES
"[facility]    Chief Complaint      Chief Complaint   Patient presents with    Cough     X1 year, patient states it does come in waves, her states he is constantly feeling like he needs to clear his throat but can not get anything to come up.  Patient states he and Dr Ballesteros have discussed its his COPD however patient states it is getting worse.        History of Present Illness      Mikhail Stanford is a 69 y.o. male. He  has a past medical history of COPD (chronic obstructive pulmonary disease), Hypertension, and Sleep apnea., who presents today for cough x1 year- gradually getting worse per patient.  He has a history of COPD and lung mass.  He is followed by Dr. Castellano with Pulmonology. He states the cough is just embarrassing and bothersome especially in [place].  He denies any acute changes.  He states he uses his inhaler as prescribed and has not smoked in 25 yrs.     Past Medical History:  Past Medical History:   Diagnosis Date    COPD (chronic obstructive pulmonary disease)     Hypertension     Sleep apnea        Past Surgical History:   has a past surgical history that includes Dental surgery; Excision of lesion of lip (N/A, 2023); Hernia repair (2020); Left heart catheterization (N/A, 07/10/2023); and Cataract extraction (Bilateral, 2024).    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     Review of Systems   Constitutional: "  Negative for chills, fatigue, fever and unexpected weight change.   HENT:  Negative for trouble swallowing.    Respiratory:  Positive for cough, chest tightness and shortness of breath. Negative for wheezing.    Cardiovascular: Negative.    Gastrointestinal:  Negative for abdominal pain.   Musculoskeletal:  Negative for gait problem.        Medications:  Outpatient Encounter Medications as of 7/15/2024   Medication Sig Dispense Refill    albuterol-budesonide (AIRSUPRA) 90-80 mcg/actuation Use 2 puffs every 6 hours as needed for shortness a breath 21.14 g 11    atorvastatin (LIPITOR) 40 MG tablet TAKE 1 TABLET DAILY 90 tablet 0    BREZTRI AEROSPHERE 160-9-4.8 mcg/actuation HFAA Inhale into the lungs.      brimonidine 0.2% (ALPHAGAN) 0.2 % Drop Place 1 drop into the left eye 2 (two) times a day.      chlorhexidine (PERIDEX) 0.12 % solution 5 mLs 3 (three) times daily.      dapagliflozin propanediol (FARXIGA) 10 mg tablet TAKE 1 TABLET ONCE DAILY 90 tablet 3    esomeprazole (NEXIUM) 40 MG capsule TAKE 1 CAPSULE BY MOUTH EVERY DAY 90 capsule 1    ezetimibe (ZETIA) 10 mg tablet TAKE 1 TABLET DAILY 90 tablet 0    fluticasone propionate (FLONASE) 50 mcg/actuation nasal spray USE 2 SPRAYS IN EACH       NOSTRIL ONCE DAILY 48 g 1    polyethylene glycol (GOLYTELY) 236-22.74-6.74 -5.86 gram suspension Take 4,000 mLs (4 L total) by mouth once. for 1 dose 4000 mL 0    tadalafiL (CIALIS) 20 MG Tab Take 1 tablet (20 mg total) by mouth daily as needed (intercourse). 24 tablet 3    tamsulosin (FLOMAX) 0.4 mg Cap Take one capsule twice a day 180 capsule 3    valsartan-hydrochlorothiazide (DIOVAN-HCT) 160-12.5 mg per tablet 1 tablet Orally Once a day 90 tablet 1    tiotropium (SPIRIVA) 18 mcg inhalation capsule 1 capsule. (Patient not taking: Reported on 7/9/2024)       No facility-administered encounter medications on file as of 7/15/2024.       Allergies:  Review of patient's allergies indicates:  No Known Allergies    Health  "Maintenance:  Immunization History   Administered Date(s) Administered    Influenza (FLUAD) - Quadrivalent - Adjuvanted - PF *Preferred* (65+) 12/21/2023    Pneumococcal Conjugate - 20 Valent 01/30/2023      Health Maintenance   Topic Date Due    Colorectal Cancer Screening  07/26/2024 (Originally 1955)    TETANUS VACCINE  05/24/2025 (Originally 2/8/1975)    Shingles Vaccine (1 of 2) 05/24/2025 (Originally 2/8/2007)    Lipid Panel  03/07/2029    Hepatitis C Screening  Completed    Abdominal Aortic Aneurysm Screening  Completed        Physical Exam      Vital Signs  Temp: 98.1 °F (36.7 °C)  Temp Source: Oral  Pulse: 75  Resp: 18  SpO2: 96 %  BP: 122/80  BP Location: Left arm  Patient Position: Sitting  Pain Score: 0-No pain  Height and Weight  Height: 5' 11" (180.3 cm)  Weight: 105.1 kg (231 lb 12.8 oz)  BSA (Calculated - sq m): 2.29 sq meters  BMI (Calculated): 32.3  Weight in (lb) to have BMI = 25: 178.9]    Physical Exam  Vitals and nursing note reviewed.   Constitutional:       Appearance: Normal appearance.   HENT:      Head: Normocephalic.      Right Ear: Hearing normal.      Left Ear: Hearing normal.      Nose: Nose normal.   Eyes:      General: Lids are normal.      Conjunctiva/sclera: Conjunctivae normal.   Neck:      Thyroid: No thyromegaly.   Cardiovascular:      Rate and Rhythm: Normal rate and regular rhythm.      Heart sounds: Normal heart sounds.   Pulmonary:      Effort: Pulmonary effort is normal.      Breath sounds: Normal breath sounds.   Musculoskeletal:         General: Normal range of motion.      Cervical back: Normal range of motion and neck supple.      Right lower leg: No edema.      Left lower leg: No edema.   Skin:     General: Skin is warm and dry.   Neurological:      General: No focal deficit present.      Mental Status: He is alert and oriented to person, place, and time.      Gait: Gait is intact.          Laboratory:  CBC:  Recent Labs   Lab 07/25/23  0751 03/07/24  0943 " 06/18/24  1521   WBC 4.88 4.27 L 5.89   RBC 4.78 5.33 4.91   Hemoglobin 14.0 15.6 14.5   Hematocrit 42.4 48.0 44.9   Platelet Count 224 205 220   MCV 88.7 90.1 91.4   MCH 29.3 29.3 29.5   MCHC 33.0 32.5 32.3     CMP:  Recent Labs   Lab 06/13/22  1546 01/31/23  0815 04/17/23  1541 03/07/24  0943 06/18/24  1521   Glucose 107 H 105   < > 105 95   Calcium 9.7 8.8   < > 9.8 8.9   Albumin 3.3 L 3.4 L   < > 3.4 L 3.4 L   Total Protein 6.6 6.4   < > 6.9  --    Sodium 139 141   < > 140 141   Potassium 4.0 4.0   < > 5.3 H 4.4   CO2 26 27   < > 33 H 26   Chloride 105 106   < > 106 109 H   BUN 26 H 30 H   < > 28 H 27 H   Alk Phos 66 58  --  63  --    ALT 41 40  --  43  --    AST 26 23  --  25  --    Bilirubin, Total 0.6 0.7  --  0.8  --     < > = values in this interval not displayed.     LIPIDS:  Recent Labs   Lab 10/04/21  0913 01/31/23  0815 03/07/24  0943   TSH  --   --  0.598   HDL Cholesterol 67 H 65 H 63 H   Cholesterol 126 157 149   Triglycerides 49 96 65   LDL Calculated 49 73 73   Cholesterol/HDL Ratio (Risk Factor) 1.9 2.4 2.4   Non-HDL 59 92 86     TSH:  Recent Labs   Lab 03/07/24  0943   TSH 0.598     A1C:  Recent Labs   Lab 04/21/23  1144   Hemoglobin A1C 6.2       Assessment/Plan     Mikhail Stanford is a 69 y.o.male with:     1. Chronic obstructive pulmonary disease, unspecified COPD type       Advised patient to call Dr. Castellano's office and see if he can get his F/u visit moved up; Currently he is due to f/u with him for CT and OV in Nov  If he is unable to get an appt within a month- advised he should return for chest xray- patient does not want to do xray today    Total time spent face-to-face and non-face-to-face coordinating care for this encounter was: 20 minutes     Chronic conditions status updated as per HPI.  Other than changes above, cont current medications and maintain follow up with specialists.  Return to clinic prn if symptoms worsen or fail to improve.    Sujey Griffin, DANIELP  [facility]

## 2024-07-18 ENCOUNTER — HOSPITAL ENCOUNTER (OUTPATIENT)
Dept: RADIOLOGY | Facility: HOSPITAL | Age: 69
Discharge: HOME | End: 2024-07-18
Attending: NURSE PRACTITIONER
Payer: MEDICARE

## 2024-07-18 ENCOUNTER — PROCEDURE VISIT (OUTPATIENT)
Dept: UROLOGY | Facility: CLINIC | Age: 69
End: 2024-07-18
Payer: MEDICARE

## 2024-07-18 VITALS
HEIGHT: 71 IN | BODY MASS INDEX: 32.62 KG/M2 | SYSTOLIC BLOOD PRESSURE: 132 MMHG | HEART RATE: 102 BPM | DIASTOLIC BLOOD PRESSURE: 89 MMHG | WEIGHT: 233 LBS

## 2024-07-18 DIAGNOSIS — R39.14 BENIGN PROSTATIC HYPERPLASIA WITH INCOMPLETE BLADDER EMPTYING: ICD-10-CM

## 2024-07-18 DIAGNOSIS — R35.1 NOCTURIA: ICD-10-CM

## 2024-07-18 DIAGNOSIS — N31.9 NEUROGENIC BLADDER: Primary | ICD-10-CM

## 2024-07-18 DIAGNOSIS — R35.0 FREQUENCY OF URINATION: ICD-10-CM

## 2024-07-18 DIAGNOSIS — N32.81 OVERACTIVE BLADDER: ICD-10-CM

## 2024-07-18 DIAGNOSIS — R39.12 WEAK URINARY STREAM: ICD-10-CM

## 2024-07-18 DIAGNOSIS — N40.1 BENIGN PROSTATIC HYPERPLASIA WITH INCOMPLETE BLADDER EMPTYING: ICD-10-CM

## 2024-07-18 PROCEDURE — 52000 CYSTOURETHROSCOPY: CPT | Mod: S$PBB,,, | Performed by: UROLOGY

## 2024-07-18 PROCEDURE — 76775 US EXAM ABDO BACK WALL LIM: CPT | Mod: TC

## 2024-07-18 PROCEDURE — 52000 CYSTOURETHROSCOPY: CPT | Mod: PBBFAC | Performed by: UROLOGY

## 2024-07-18 PROCEDURE — 76775 US EXAM ABDO BACK WALL LIM: CPT | Mod: 26,,, | Performed by: RADIOLOGY

## 2024-07-18 RX ORDER — LIDOCAINE HYDROCHLORIDE 20 MG/ML
JELLY TOPICAL
Status: COMPLETED | OUTPATIENT
Start: 2024-07-18 | End: 2024-07-18

## 2024-07-18 RX ORDER — VIBEGRON 75 MG/1
75 TABLET, FILM COATED ORAL DAILY
COMMUNITY
Start: 2024-07-18 | End: 2024-08-01

## 2024-07-18 RX ADMIN — LIDOCAINE HYDROCHLORIDE 10 ML: 20 JELLY TOPICAL at 02:07

## 2024-07-18 NOTE — PROCEDURES
This pleasant 69 year old male presents to the clinic as a new patient referral from Dr. DAVID Ballesteros for Enlarged Prostate.  Patient states symptoms started about one year ago.  His IPSS score is 27 that reflects incomplete bladder emptying, frequency, intermittency, urgency, weak stream, and nocturia 5 times a night.  His PVR is 033 mls.  He denies dysuria, hematuria or straining to urinate.   He denies fever, chills, nausea or vomiting.  He denies abdominal, bladder or back pain.  He denies any family history of prostate cancer.  He denies smoking or drinking alcohol.  His PSA's are WNL.  His urine culture on April 8, 2024 was negative.  He was started on Flomax 0.4 mg about 3 weeks ago.  I discussed giving the medication more time to work.  He will continue the Flomax 0.4 mg one at bedtime for another week or two and if he tolerates the medication he will increase the Flomax to one twice a day.  If he cannot tolerate the increase he will go back to one Flomax at bedtime.  We discussed if no improvement at the next visit then we will consider adding Finasteride versus the BPH work up for possible TURP.  I will see him back in the clinic in 2 months or sooner if needed.  I discussed the plan in detail with Urologist Dr. DENISA Parson and the patient and they are in agreement with the plan.  All his questions were answered at today's visit. I spent 30 minutes counseling this patient.        PSA History:   PSA was 0.554 on 04/18/2024  PSA was 0.603 on 01/31/2023  PSA was 0.415 on 10/04/2021   -----------------------------------------------------------------------------------------------------------  [May 7, 2024].      This pleasant 69 year old male presents to the clinic for follow up of BPH with LUTS.  Patient states he is still not pleased with the way he voids. Patient states symptoms started about one year ago. His Flomax 0.4 mg was increased to one twice a day at the last visit.  He is tolerating this medication  without side effects.  His IPSS score improved from 27 to 13 that reflects incomplete bladder emptying, frequency, intermittency, and nocturia 4 times a night.  His PVR is 008 mls.  He denies dysuria, hematuria, weak stream, urgency or straining to urinate.   He denies fever, chills, nausea or vomiting.  He denies abdominal, bladder or back pain.  He denies any family history of prostate cancer.  He denies smoking or drinking alcohol.  His PSA's are WNL.  His urine culture on April 8, 2024 was negative.   We discussed adding Finasteride versus the BPH work up for possible TURP.  He desires to do the BPH work up with DUNG and Cystoscopy and desires not to start the Finasteride.  This will be scheduled as outlined in the plan.  I will reculture his urine and treat if indicated. He will continue the Flomax 0.4 mg one twice a day.    I discussed the plan in detail with Urologist Dr. DENISA Parson and the patient and they are in agreement with the plan.  All his questions were answered at today's visit.         PSA History:   PSA was 0.554 on 04/18/2024  PSA was 0.603 on 01/31/2023  PSA was 0.415 on 10/04/2021   ---------------------------------------------------------------------------------------------------  [July 9, 2024].      Review of Systems   Constitutional:  Negative for activity change and fever.   HENT:  Negative for hearing loss and trouble swallowing.    Eyes:  Negative for visual disturbance.   Respiratory:  Negative for cough, shortness of breath and wheezing.    Cardiovascular:  Negative for chest pain.   Gastrointestinal:  Negative for abdominal pain, diarrhea, nausea and vomiting.   Endocrine: Negative for polyuria.   Genitourinary:  Positive for frequency. Negative for bladder incontinence, decreased urine volume, difficulty urinating, discharge, dysuria, enuresis, erectile dysfunction, flank pain, genital sores, hematuria, penile pain, penile swelling, scrotal swelling, testicular pain and urgency.         BPH with LUTS        Weak stream        Nocturia    Musculoskeletal:  Negative for back pain and gait problem.   Integumentary:  Negative for rash.   Neurological:  Negative for speech difficulty and weakness.   Psychiatric/Behavioral:  Negative for behavioral problems and confusion.    ---------------------------------------------------------------------------------------------------------------------------------------------------------------------------------------------------------------------------------------------------------------------------------------------------------------------------------------  Above notes are notes of Mr. Ceferino Castillo from July 9, 2024.    Patient continues to void about every 1-1/2-2 hours day and night.  Patient here today for workup to see if he needs to have TURP as he has not responded favorably to alpha blockers.

## 2024-07-18 NOTE — PROCEDURES
07/18/24 1350  US Kidney  Performed: 07/18/24 1345  Final        Impression: No evidence of abnormality demonstrated. Ultrasound images stored and captured. Electronically signed by: Otilio Benz Date: 07/18/2024 Time: 13:48       -------------------------------------------------------------------------------------------------------------------------------------------------------------------------------------------------      Flexible cystoscopy    Preoperative diagnosis:  Benign prostatic hyperplasia with prostatism and bladder outlet obstruction    Postoperative diagnosis:  Early BPH and chronic prostatitis with minimal obstruction    Description:  The patient was placed in the supine position in the clinic cystoscopy room and prepared for flexible cystoscopy.  Urethra was anesthetized with lidocaine jelly.  No sedation was given.  The 16.5 Welsh Olympus flexible cystoscope was passed transurethrally into the bladder without problems.  Anterior urethra was clear.  Posterior urethra had some inflammatory change but prostate was short and there was not total obstruction present.  Entire prostatic urethra was only about 3 cm in length.  Bladder was only mildly trabeculated with no cellules etc..  The bladder was free of tumors, stones, diverticula or other significant problems.  Orifices were normal in size shape and position with bilateral clear efflux.  Retrograde view of the bladder neck revealed no intravesical extension of prostate tissue.  There was no evidence of real obstruction present.  Scope was removed with similar findings.  He tolerated procedure well and left for regular exam room in satisfactory condition.  I do not feel there was significant obstructive uropathy and do not recommend any surgery.

## 2024-07-18 NOTE — PATIENT INSTRUCTIONS
No evidence of significant obstruction found at cystoscopy.  I do not feel patient needs TURP as I do not think that will help his current symptoms.  Patient given 3 days Cipro 500 mg b.i.d. to cover instrumentation.  Patient given samples of Gemtesa 75 mg for 2 weeks.  He will notify the office if that is helpful with reducing frequency and we will try to get him help getting the product.  Patient will have follow-up with Mr. Castillo in about 3 months.      3 month appointment with Ceferino Castillo follow up for bladder outlet obstruction

## 2024-07-19 ENCOUNTER — TELEPHONE (OUTPATIENT)
Dept: ORTHOPEDICS | Facility: CLINIC | Age: 69
End: 2024-07-19
Payer: COMMERCIAL

## 2024-07-19 NOTE — TELEPHONE ENCOUNTER
CALLED PATIENT TO LET HIM KNOW CHECK-IN TIME FOR SURGERY ON MONDAY, 7/22 WILL BE 6:15 AM.      CHECK IN GROUND FLOOR AMBULATORY BUILD; NPO AFTER MIDNIGHT SUNDAY

## 2024-07-21 ENCOUNTER — ANESTHESIA EVENT (OUTPATIENT)
Dept: SURGERY | Facility: HOSPITAL | Age: 69
End: 2024-07-21
Payer: MEDICARE

## 2024-07-21 NOTE — ANESTHESIA PREPROCEDURE EVALUATION
07/21/2024  Mikhail Stanford is a 69 y.o., male.      Pre-op Assessment    I have reviewed the Patient Summary Reports.     I have reviewed the Nursing Notes. I have reviewed the NPO Status.   I have reviewed the Medications.     Review of Systems  Anesthesia Hx:  No problems with previous Anesthesia             Denies Family Hx of Anesthesia complications.    Denies Personal Hx of Anesthesia complications.                    Social:  Former Smoker, No Alcohol Use       Cardiovascular:  Exercise tolerance: good   Hypertension           hyperlipidemia   ECG has been reviewed.                          Pulmonary:   COPD, mild   Shortness of breath  Sleep Apnea H/O ILD, lung mass    PFTs show moderate restrictive lung disease               Renal/:  Chronic Renal Disease, CKD                Hepatic/GI:     GERD             Endocrine:        Obesity / BMI > 30      Physical Exam  General: Well nourished, Cooperative and Alert    Airway:  Mallampati: II   Mouth Opening: Normal  TM Distance: Normal  Tongue: Normal  Neck ROM: Normal ROM    Dental:  Intact    Chest/Lungs:  Clear to auscultation, Normal Respiratory Rate    Heart:  Rate: Normal  Rhythm: Regular Rhythm        Chemistry        Component Value Date/Time     06/18/2024 1521    K 4.4 06/18/2024 1521     (H) 06/18/2024 1521    CO2 26 06/18/2024 1521    BUN 27 (H) 06/18/2024 1521    CREATININE 1.79 (H) 06/18/2024 1521    GLU 95 06/18/2024 1521        Component Value Date/Time    CALCIUM 8.9 06/18/2024 1521    ALKPHOS 63 03/07/2024 0943    AST 25 03/07/2024 0943    ALT 43 03/07/2024 0943    BILITOT 0.8 03/07/2024 0943    ESTGFRAFRICA 59 (L) 10/04/2021 0913    EGFRNONAA 43 (L) 06/13/2022 1546        Lab Results   Component Value Date    WBC 5.89 06/18/2024    HGB 14.5 06/18/2024    HCT 44.9 06/18/2024     06/18/2024     Results for orders  placed or performed in visit on 06/28/23   EKG 12-lead    Collection Time: 06/28/23 10:09 AM    Narrative    Test Reason : I10,    Vent. Rate : 104 BPM     Atrial Rate : 104 BPM     P-R Int : 152 ms          QRS Dur : 072 ms      QT Int : 346 ms       P-R-T Axes : 049 017 062 degrees     QTc Int : 454 ms    Sinus tachycardia with occasional Premature ventricular complexes  Nonspecific ST and T wave abnormality  Abnormal ECG  When compared with ECG of 14-FEB-2022 09:49,  Premature ventricular complexes are now Present  Confirmed by Troy CAMERON, Jeremias MALONE ([id number]) on 6/29/2023 2:26:51 PM    Referred By:             Confirmed By:Jeremias Simmons MD     TTE 2/4/22  Summary    The left ventricle is normal in size with mild concentric hypertrophy and  The estimated ejection fraction is 60%.  Normal right ventricular size.  Mild mitral regurgitation.  Mild tricuspid regurgitation.  Mild left atrial enlargement.      PFTs 11-23-21  1. Acceptable quality studies  2. No obstruction by ratio. No significant bronchodilator response. Low FEV1 and FVC, consistent with restriction.  3. Restriction by TLC, moderate.   4. Mild reduction in DLCO, corrected for Hgb.     Impression:  Moderate restriction with mild impairment in gas exchange.     Anesthesia Plan  Type of Anesthesia, risks & benefits discussed:    Anesthesia Type: Gen Supraglottic Airway, Gen ETT  Intra-op Monitoring Plan: Standard ASA Monitors  Post Op Pain Control Plan: multimodal analgesia  Induction:  IV  Airway Plan: Direct, Post-Induction  Informed Consent: Informed consent signed with the Patient and all parties understand the risks and agree with anesthesia plan.  All questions answered.   ASA Score: 3  Day of Surgery Review of History & Physical: H&P Update referred to the surgeon/provider.I have interviewed and examined the patient. I have reviewed the patient's H&P dated: There are no significant changes.     Ready For Surgery From Anesthesia Perspective.      .

## 2024-07-22 ENCOUNTER — HOSPITAL ENCOUNTER (OUTPATIENT)
Facility: HOSPITAL | Age: 69
Discharge: HOME | End: 2024-07-22
Attending: ORTHOPAEDIC SURGERY | Admitting: ORTHOPAEDIC SURGERY
Payer: COMMERCIAL

## 2024-07-22 ENCOUNTER — ANESTHESIA (OUTPATIENT)
Dept: SURGERY | Facility: HOSPITAL | Age: 69
End: 2024-07-22
Payer: COMMERCIAL

## 2024-07-22 VITALS
DIASTOLIC BLOOD PRESSURE: 93 MMHG | SYSTOLIC BLOOD PRESSURE: 148 MMHG | HEIGHT: 71 IN | WEIGHT: 233 LBS | HEART RATE: 92 BPM | RESPIRATION RATE: 18 BRPM | OXYGEN SATURATION: 92 % | BODY MASS INDEX: 32.62 KG/M2 | TEMPERATURE: 98 F

## 2024-07-22 DIAGNOSIS — S56.511A PARTIAL TEAR OF COMMON EXTENSOR TENDON OF RIGHT ELBOW: Primary | ICD-10-CM

## 2024-07-22 PROCEDURE — 37000009 HC ANESTHESIA EA ADD 15 MINS: Performed by: ORTHOPAEDIC SURGERY

## 2024-07-22 PROCEDURE — 71000033 HC RECOVERY, INTIAL HOUR: Performed by: ORTHOPAEDIC SURGERY

## 2024-07-22 PROCEDURE — 94640 AIRWAY INHALATION TREATMENT: CPT

## 2024-07-22 PROCEDURE — 27000716 HC OXISENSOR PROBE, ANY SIZE: Performed by: ANESTHESIOLOGY

## 2024-07-22 PROCEDURE — 36000708 HC OR TIME LEV III 1ST 15 MIN: Performed by: ORTHOPAEDIC SURGERY

## 2024-07-22 PROCEDURE — 27201423 OPTIME MED/SURG SUP & DEVICES STERILE SUPPLY: Performed by: ORTHOPAEDIC SURGERY

## 2024-07-22 PROCEDURE — C1713 ANCHOR/SCREW BN/BN,TIS/BN: HCPCS | Performed by: ORTHOPAEDIC SURGERY

## 2024-07-22 PROCEDURE — 99900035 HC TECH TIME PER 15 MIN (STAT)

## 2024-07-22 PROCEDURE — 27000177 HC AIRWAY, LARYNGEAL MASK: Performed by: ANESTHESIOLOGY

## 2024-07-22 PROCEDURE — 27000510 HC BLANKET BAIR HUGGER ANY SIZE: Performed by: ANESTHESIOLOGY

## 2024-07-22 PROCEDURE — 25000003 PHARM REV CODE 250: Performed by: NURSE ANESTHETIST, CERTIFIED REGISTERED

## 2024-07-22 PROCEDURE — 63600175 PHARM REV CODE 636 W HCPCS: Performed by: NURSE ANESTHETIST, CERTIFIED REGISTERED

## 2024-07-22 PROCEDURE — 24342 REPAIR OF RUPTURED TENDON: CPT | Mod: RT,,, | Performed by: ORTHOPAEDIC SURGERY

## 2024-07-22 PROCEDURE — 37000008 HC ANESTHESIA 1ST 15 MINUTES: Performed by: ORTHOPAEDIC SURGERY

## 2024-07-22 PROCEDURE — 25000003 PHARM REV CODE 250: Performed by: ORTHOPAEDIC SURGERY

## 2024-07-22 PROCEDURE — 25000242 PHARM REV CODE 250 ALT 637 W/ HCPCS: Performed by: ANESTHESIOLOGY

## 2024-07-22 PROCEDURE — 25272 REPAIR FOREARM TENDON/MUSCLE: CPT | Mod: 51,RT,, | Performed by: ORTHOPAEDIC SURGERY

## 2024-07-22 PROCEDURE — 71000016 HC POSTOP RECOV ADDL HR: Performed by: ORTHOPAEDIC SURGERY

## 2024-07-22 PROCEDURE — 36000709 HC OR TIME LEV III EA ADD 15 MIN: Performed by: ORTHOPAEDIC SURGERY

## 2024-07-22 PROCEDURE — 71000015 HC POSTOP RECOV 1ST HR: Performed by: ORTHOPAEDIC SURGERY

## 2024-07-22 PROCEDURE — 27000655: Performed by: ANESTHESIOLOGY

## 2024-07-22 DEVICE — IMPL DELIVERY SYS,DISTAL BICEPS, BC
Type: IMPLANTABLE DEVICE | Site: ARM | Status: FUNCTIONAL
Brand: ARTHREX®

## 2024-07-22 DEVICE — SUTR ANCHBIO-COMP S-TAK KNOTLESS
Type: IMPLANTABLE DEVICE | Site: ARM | Status: FUNCTIONAL
Brand: ARTHREX®

## 2024-07-22 RX ORDER — OXYCODONE AND ACETAMINOPHEN 10; 325 MG/1; MG/1
1 TABLET ORAL EVERY 6 HOURS PRN
Qty: 30 TABLET | Refills: 0 | Status: SHIPPED | OUTPATIENT
Start: 2024-07-22

## 2024-07-22 RX ORDER — CELECOXIB 200 MG/1
200 CAPSULE ORAL 2 TIMES DAILY
Qty: 60 CAPSULE | Refills: 0 | Status: SHIPPED | OUTPATIENT
Start: 2024-07-22

## 2024-07-22 RX ORDER — MUPIROCIN 20 MG/G
OINTMENT TOPICAL
Status: DISCONTINUED | OUTPATIENT
Start: 2024-07-22 | End: 2024-07-22 | Stop reason: HOSPADM

## 2024-07-22 RX ORDER — MUPIROCIN 20 MG/G
OINTMENT TOPICAL 2 TIMES DAILY
Status: DISCONTINUED | OUTPATIENT
Start: 2024-07-22 | End: 2024-07-22 | Stop reason: HOSPADM

## 2024-07-22 RX ORDER — LIDOCAINE HYDROCHLORIDE 20 MG/ML
INJECTION, SOLUTION EPIDURAL; INFILTRATION; INTRACAUDAL; PERINEURAL
Status: DISCONTINUED | OUTPATIENT
Start: 2024-07-22 | End: 2024-07-22

## 2024-07-22 RX ORDER — ACETAMINOPHEN 325 MG/1
650 TABLET ORAL EVERY 4 HOURS PRN
Status: DISCONTINUED | OUTPATIENT
Start: 2024-07-22 | End: 2024-07-22 | Stop reason: HOSPADM

## 2024-07-22 RX ORDER — CEFAZOLIN SODIUM 1 G/3ML
INJECTION, POWDER, FOR SOLUTION INTRAMUSCULAR; INTRAVENOUS
Status: DISCONTINUED | OUTPATIENT
Start: 2024-07-22 | End: 2024-07-22

## 2024-07-22 RX ORDER — MORPHINE SULFATE 10 MG/ML
4 INJECTION INTRAMUSCULAR; INTRAVENOUS; SUBCUTANEOUS EVERY 5 MIN PRN
Status: DISCONTINUED | OUTPATIENT
Start: 2024-07-22 | End: 2024-07-22 | Stop reason: HOSPADM

## 2024-07-22 RX ORDER — SODIUM CHLORIDE, SODIUM LACTATE, POTASSIUM CHLORIDE, CALCIUM CHLORIDE 600; 310; 30; 20 MG/100ML; MG/100ML; MG/100ML; MG/100ML
INJECTION, SOLUTION INTRAVENOUS CONTINUOUS PRN
Status: DISCONTINUED | OUTPATIENT
Start: 2024-07-22 | End: 2024-07-22

## 2024-07-22 RX ORDER — OXYCODONE HYDROCHLORIDE 5 MG/1
5 TABLET ORAL EVERY 4 HOURS PRN
Status: DISCONTINUED | OUTPATIENT
Start: 2024-07-22 | End: 2024-07-22 | Stop reason: HOSPADM

## 2024-07-22 RX ORDER — PHENYLEPHRINE HYDROCHLORIDE 10 MG/ML
INJECTION INTRAVENOUS
Status: DISCONTINUED | OUTPATIENT
Start: 2024-07-22 | End: 2024-07-22

## 2024-07-22 RX ORDER — MEPERIDINE HYDROCHLORIDE 25 MG/ML
25 INJECTION INTRAMUSCULAR; INTRAVENOUS; SUBCUTANEOUS EVERY 10 MIN PRN
Status: DISCONTINUED | OUTPATIENT
Start: 2024-07-22 | End: 2024-07-22 | Stop reason: HOSPADM

## 2024-07-22 RX ORDER — MIDAZOLAM HYDROCHLORIDE 1 MG/ML
INJECTION INTRAMUSCULAR; INTRAVENOUS
Status: DISCONTINUED | OUTPATIENT
Start: 2024-07-22 | End: 2024-07-22

## 2024-07-22 RX ORDER — DEXAMETHASONE SODIUM PHOSPHATE 4 MG/ML
INJECTION, SOLUTION INTRA-ARTICULAR; INTRALESIONAL; INTRAMUSCULAR; INTRAVENOUS; SOFT TISSUE
Status: DISCONTINUED | OUTPATIENT
Start: 2024-07-22 | End: 2024-07-22

## 2024-07-22 RX ORDER — ACETAMINOPHEN 10 MG/ML
INJECTION, SOLUTION INTRAVENOUS
Status: DISCONTINUED | OUTPATIENT
Start: 2024-07-22 | End: 2024-07-22

## 2024-07-22 RX ORDER — HYDROMORPHONE HYDROCHLORIDE 2 MG/ML
1 INJECTION, SOLUTION INTRAMUSCULAR; INTRAVENOUS; SUBCUTANEOUS EVERY 4 HOURS PRN
Status: DISCONTINUED | OUTPATIENT
Start: 2024-07-22 | End: 2024-07-22 | Stop reason: HOSPADM

## 2024-07-22 RX ORDER — ONDANSETRON 4 MG/1
8 TABLET, ORALLY DISINTEGRATING ORAL EVERY 8 HOURS PRN
Status: DISCONTINUED | OUTPATIENT
Start: 2024-07-22 | End: 2024-07-22 | Stop reason: HOSPADM

## 2024-07-22 RX ORDER — SODIUM CHLORIDE 9 MG/ML
INJECTION, SOLUTION INTRAVENOUS CONTINUOUS
Status: DISCONTINUED | OUTPATIENT
Start: 2024-07-22 | End: 2024-07-22 | Stop reason: HOSPADM

## 2024-07-22 RX ORDER — PROPOFOL 10 MG/ML
VIAL (ML) INTRAVENOUS
Status: DISCONTINUED | OUTPATIENT
Start: 2024-07-22 | End: 2024-07-22

## 2024-07-22 RX ORDER — DIPHENHYDRAMINE HYDROCHLORIDE 50 MG/ML
25 INJECTION INTRAMUSCULAR; INTRAVENOUS EVERY 6 HOURS PRN
Status: DISCONTINUED | OUTPATIENT
Start: 2024-07-22 | End: 2024-07-22 | Stop reason: HOSPADM

## 2024-07-22 RX ORDER — ONDANSETRON HYDROCHLORIDE 2 MG/ML
INJECTION, SOLUTION INTRAVENOUS
Status: DISCONTINUED | OUTPATIENT
Start: 2024-07-22 | End: 2024-07-22

## 2024-07-22 RX ORDER — HYDROMORPHONE HYDROCHLORIDE 2 MG/ML
INJECTION, SOLUTION INTRAMUSCULAR; INTRAVENOUS; SUBCUTANEOUS
Status: DISCONTINUED | OUTPATIENT
Start: 2024-07-22 | End: 2024-07-22

## 2024-07-22 RX ORDER — ONDANSETRON 4 MG/1
4 TABLET, ORALLY DISINTEGRATING ORAL EVERY 8 HOURS PRN
Qty: 30 TABLET | Refills: 0 | Status: SHIPPED | OUTPATIENT
Start: 2024-07-22

## 2024-07-22 RX ORDER — FENTANYL CITRATE 50 UG/ML
INJECTION, SOLUTION INTRAMUSCULAR; INTRAVENOUS
Status: DISCONTINUED | OUTPATIENT
Start: 2024-07-22 | End: 2024-07-22

## 2024-07-22 RX ORDER — IPRATROPIUM BROMIDE AND ALBUTEROL SULFATE 2.5; .5 MG/3ML; MG/3ML
3 SOLUTION RESPIRATORY (INHALATION) ONCE
Status: COMPLETED | OUTPATIENT
Start: 2024-07-22 | End: 2024-07-22

## 2024-07-22 RX ORDER — HYDROMORPHONE HYDROCHLORIDE 2 MG/ML
0.5 INJECTION, SOLUTION INTRAMUSCULAR; INTRAVENOUS; SUBCUTANEOUS EVERY 5 MIN PRN
Status: DISCONTINUED | OUTPATIENT
Start: 2024-07-22 | End: 2024-07-22 | Stop reason: HOSPADM

## 2024-07-22 RX ORDER — ONDANSETRON HYDROCHLORIDE 2 MG/ML
4 INJECTION, SOLUTION INTRAVENOUS DAILY PRN
Status: DISCONTINUED | OUTPATIENT
Start: 2024-07-22 | End: 2024-07-22 | Stop reason: HOSPADM

## 2024-07-22 RX ORDER — GLUCAGON 1 MG
1 KIT INJECTION
Status: DISCONTINUED | OUTPATIENT
Start: 2024-07-22 | End: 2024-07-22 | Stop reason: HOSPADM

## 2024-07-22 RX ADMIN — CEFAZOLIN 2 G: 1 INJECTION, POWDER, FOR SOLUTION INTRAMUSCULAR; INTRAVENOUS; PARENTERAL at 07:07

## 2024-07-22 RX ADMIN — HYDROMORPHONE HYDROCHLORIDE 0.4 MG: 2 INJECTION, SOLUTION INTRAMUSCULAR; INTRAVENOUS; SUBCUTANEOUS at 08:07

## 2024-07-22 RX ADMIN — PHENYLEPHRINE HYDROCHLORIDE 200 MCG: 10 INJECTION INTRAVENOUS at 08:07

## 2024-07-22 RX ADMIN — PHENYLEPHRINE HYDROCHLORIDE 200 MCG: 10 INJECTION INTRAVENOUS at 07:07

## 2024-07-22 RX ADMIN — OXYCODONE 5 MG: 5 TABLET ORAL at 12:07

## 2024-07-22 RX ADMIN — LIDOCAINE HYDROCHLORIDE 100 MG: 20 INJECTION, SOLUTION INTRAVENOUS at 07:07

## 2024-07-22 RX ADMIN — ONDANSETRON 4 MG: 2 INJECTION INTRAMUSCULAR; INTRAVENOUS at 07:07

## 2024-07-22 RX ADMIN — FENTANYL CITRATE 25 MCG: 50 INJECTION INTRAMUSCULAR; INTRAVENOUS at 07:07

## 2024-07-22 RX ADMIN — HYDROMORPHONE HYDROCHLORIDE 0.4 MG: 2 INJECTION, SOLUTION INTRAMUSCULAR; INTRAVENOUS; SUBCUTANEOUS at 10:07

## 2024-07-22 RX ADMIN — FENTANYL CITRATE 50 MCG: 50 INJECTION INTRAMUSCULAR; INTRAVENOUS at 07:07

## 2024-07-22 RX ADMIN — MIDAZOLAM HYDROCHLORIDE 2 MG: 1 INJECTION, SOLUTION INTRAMUSCULAR; INTRAVENOUS at 07:07

## 2024-07-22 RX ADMIN — SODIUM CHLORIDE, POTASSIUM CHLORIDE, SODIUM LACTATE AND CALCIUM CHLORIDE: 600; 310; 30; 20 INJECTION, SOLUTION INTRAVENOUS at 07:07

## 2024-07-22 RX ADMIN — ACETAMINOPHEN 1000 MG: 10 INJECTION, SOLUTION INTRAVENOUS at 07:07

## 2024-07-22 RX ADMIN — IPRATROPIUM BROMIDE AND ALBUTEROL SULFATE 3 ML: .5; 3 SOLUTION RESPIRATORY (INHALATION) at 10:07

## 2024-07-22 RX ADMIN — DEXAMETHASONE SODIUM PHOSPHATE 4 MG: 4 INJECTION, SOLUTION INTRA-ARTICULAR; INTRALESIONAL; INTRAMUSCULAR; INTRAVENOUS; SOFT TISSUE at 07:07

## 2024-07-22 RX ADMIN — PROPOFOL 200 MG: 10 INJECTION, EMULSION INTRAVENOUS at 07:07

## 2024-07-22 NOTE — OP NOTE
[facility] - Orthopedic Periop Services  Operative Note    Surgery Date: 7/22/2024      Surgeon(s) and Role:     * Jourdan Winslow MD - Primary    Assistant: Harrison Snider    Pre-op Diagnosis:   Partial tear of common extensor tendon of right elbow [S56.511A]   Partial tear of right distal biceps tendon    Post-op Diagnosis:  Post-Op Diagnosis Codes:     * Partial tear of common extensor tendon of right elbow [S56.511A]   Partial tear right distal biceps tendon    Procedure:    Open repair of right distal biceps tendon  Open repair of tear of the extensor carpi radialis brevis tendon     Anesthesia:  General    EBL:  5cc    Implants:   Implant Name Type Inv. Item Serial No.  Lot No. LRB No. Used Action   SYS IMPL DEL DISTAL BICEPS BC - [id number]  SYS IMPL DEL DISTAL BICEPS BC  [employer] [id number] Right 1 Implanted   ANCHOR SUTURETAK 3X12.7MM #2 - [id number]  ANCHOR SUTURETAK 3X12.7MM #2  [employer] [id number] Right 1 Implanted       Tourniquet time: 101 mins    Complication:   none    Procedure: The patient was taken to the operating room and placedsupine.  Anesthesia was administered and pre-operative antibiotics were given.  A timeout was performed.  Patient was positioned appropriately and prepped and draped in the usual sterile fashion.    Esmarch bandage was applied tourniquet was taken up to 250 mmHg.  I began the procedure by creating a 4 cm incision centered over the lateral epicondyle incision was carried through skin and subcutaneous tissue.  A split was made just superior to the EDC tendon and the extensor carpi radialis longus and brevis was able to be identified the longus was incised the extensor carpi radialis brevis tendon was able to be then exposed and whitish discolored disorganized tendon was able to be identified consistent with the diagnosis of lateral epicondylitis.  I was able to meticulously debride the extensor carpi radialis brevis at this point removing the diseased tissue and then cleaning  the footprint decorticating the footprint of the lateral epicondyle.  Lateral collateral ligament was able to be protected during this and after meticulously cleaning the footprint I was able to plasty 3.0 mm suture tack anchor into the safe zone of the lateral epicondyle.  From there I was able to pass the working stitch through the deep tendinous structure that remain and completed the repair of the extensor tendon by utilizing a knotless mechanism in which the suture was passed back through the anchor and this provided an excellent repair of the common extensor tendon.  A cut the residual suture strand at this point and then repaired the fascia overlying the defect irrigated the wound copiously and then closed this with 2-0 Vicryl followed by 3-0 Monocryl in a running subcuticular fashion.  Attention was then turned towards the biceps tendon as I made a 3 cm transverse incision overlying the tuberosity of the radius incision was carried through skin and subcutaneous tissue.  Neurovascular structures were protected.  The Adela injuries identified these veins were then tied off in order to expose the biceps insertion.  The biceps insertion was quite diseased and flattened I had to meticulously remove the partial tear and detached the tendon in the footprint was then meticulously cleaned and debrided in order to fit through a 7 mm aperture.  After cleaning this it was whip stitched a drill tunnel was created after passing a guidewire then over-reamed this.  The suture strands were then coupled to a button this was flipped on the far cortex and verified under fluoroscopic imaging.  I was then able to pass a 7 mm distal biceps screw into position and this completed the fixation of the biceps tendon.  Excess suture was then cut this area was copiously irrigated to remove any residual bony fragmentation tourniquet was then released bleeders were coagulated layered closure performed utilizing 2-0 Vicryl and 3-0 Monocryl  in a running subcuticular fashion posterior slab splint was placed after sterile dressings were applied.    Disposition:awakened from anesthesia, extubated and taken to the recovery room in a stable condition, having suffered no apparent untoward event.

## 2024-07-22 NOTE — H&P
H&P completed  has been reviewed, the patient has been examined and:  I concur with the findings and no changes have occurred since H&P was written.    There are no hospital problems to display for this patient.        69 y.o. Male returns to clinic for a follow up visit regarding       ICD-10-CM ICD-9-CM   1. Partial tear of common extensor tendon of right elbow  S56.511A 841.8   2. Rupture of right distal biceps tendon, initial encounter  S46.211A 840.8          Here today for MRI follow-up.  Complains of significant pain and dysfunction.          Past Medical History:   Diagnosis Date    COPD (chronic obstructive pulmonary disease)      Hypertension      Sleep apnea              Past Surgical History:   Procedure Laterality Date    DENTAL SURGERY        EXCISION OF LESION OF LIP N/A 02/21/2023     Procedure: EXCISION, LESION, LIP;  Surgeon: Kuldeep Yao MD;  Location: [facility] OR;  Service: ENT;  Laterality: N/A;    HERNIA REPAIR   11/24/2020    LEFT HEART CATHETERIZATION N/A 7/10/2023     Procedure: Left heart cath;  Surgeon: Jeremias Simmons MD;  Location: [facility] CATH LAB;  Service: Cardiology;  Laterality: N/A;            PHYSICAL EXAMINATION:     General     Nursing note and vitals reviewed.  Constitutional: He is oriented to person, place, and time. He appears well-developed and well-nourished.   HENT:   Head: Normocephalic and atraumatic.   Nose: Nose normal.   Eyes: EOM are normal. Pupils are equal, round, and reactive to light.   Neck: Neck supple.   Cardiovascular:  Normal rate and intact distal pulses.            Pulmonary/Chest: Effort normal. No respiratory distress. He exhibits no tenderness.   Abdominal: Soft. He exhibits no distension. There is no abdominal tenderness.   Neurological: He is alert and oriented to person, place, and time. He has normal reflexes.   Psychiatric: He has a normal mood and affect. His behavior is normal. Judgment and thought content normal.                   Right Hand/Wrist Exam      Inspection   Scars: Wrist - absent      Range of Motion      Wrist   Extension:  normal   Flexion:  normal      Other      Neuorologic Exam     Median Distribution: normal  Ulnar Distribution: normal  Radial Distribution: normal        Right Elbow Exam      Inspection   Scars: absent  Bruising: absent  Atrophy: absent     Pain   The patient exhibits pain of the extensor musculature and lateral epicondyle     Range of Motion   Extension:  normal   Flexion:  normal   Pronation:  normal   Supination:  normal      Tests   Varus: negative  Valgus: negative  Tennis Elbow: severe  Golfer's Elbow: negative  Radial Capitellar Grind: negative     Other   Sensation: normal              Muscle Strength   Right Upper Extremity   Wrist extension: 4/5   Wrist flexion: 5/5   Elbow Pronation:  5/5   Elbow Supination:  5/5   Elbow Extension: 5/5  Elbow Flexion: 5/5     Hook test performed today.  This is equivocal.  Limited biceps strength is present.  Limited supination strength is demonstrated today.  No deformity or masses present along the distal biceps muscle belly     IMAGING:  MRI from the imaging center was reviewed today of the right elbow.  This demonstrates the presence of a full-thickness tear of the biceps insertion on the radius as well as a full-thickness tear of the common extensor tendon off the humeral epicondyle.  ASSESSMENT:        ICD-10-CM ICD-9-CM   1. Partial tear of common extensor tendon of right elbow  S56.511A 841.8   2. Rupture of right distal biceps tendon, initial encounter  S46.211A 840.8         PLAN:     -Findings and treatment options were discussed with the patient  -All questions answered        He is complaining of functional limitations in his right elbow as well as tremendous pain.  He feels terribly weak.  Per my read he has acomplete tear of his biceps tendon in a near complete tear of the common extensor tendon of the right elbow.  The biceps certainly  require  stabilization in his common extensor tendon is likely torn such a degree this will require surgery as well.  Think it is reasonable therefore to consider fixation of both of these tendon tears at the same time in order to have an optimal recovery optimal function.  Will plan for right open biceps tendon repair as well as repair of the common extensor tendon.  Risks benefits alternatives were discussed.     There are no Patient Instructions on file for this visit.

## 2024-07-22 NOTE — TRANSFER OF CARE
"Anesthesia Transfer of Care Note    Patient: Mikhail Stanford    Procedure(s) Performed: Procedure(s) (LRB):  REINSERTION, TENDON, BICEPS OR TRICEPS, RUPTURED, AT ELBOW (Right)  TENOTOMY, ELBOW, WITH DEBRIDEMENT (Right)    Patient location: PACU    Anesthesia Type: general    Transport from OR: Transported from OR on 6-10 L/min O2 by face mask with adequate spontaneous ventilation    Post pain: adequate analgesia    Post assessment: no apparent anesthetic complications    Post vital signs: stable    Level of consciousness: awake, alert and oriented    Nausea/Vomiting: no nausea/vomiting    Complications: none    Transfer of care protocol was followed      Last vitals: Visit Vitals  BP (!) 149/86   Pulse 93   Temp 36.6 °C (97.8 °F)   Resp 14   Ht 5' 11" (1.803 m)   Wt 105.7 kg (233 lb)   SpO2 (!) 93%   BMI 32.50 kg/m²     "

## 2024-07-22 NOTE — ANESTHESIA PROCEDURE NOTES
Intubation    Date/Time: 7/22/2024 7:41 AM    Performed by: Lorena Burden CRNA  Authorized by: Zach Juárez MD    Intubation:     Induction:  Intravenous    Intubated:  Postinduction    Mask Ventilation:  Not attempted    Attempts:  1    Attempted By:  CRNA    Method of Intubation:  Other (see comments)    Difficult Airway Encountered?: No      Complications:  None    Airway Device:  Supraglottic airway/LMA    Airway Device Size:  5.0    Style/Cuff Inflation:  Cuffed (inflated to minimal occlusive pressure)    Placement Verified By:  Capnometry    Complicating Factors:  None    Findings Post-Intubation:  BS equal bilateral and atraumatic/condition of teeth unchanged

## 2024-07-22 NOTE — PLAN OF CARE
Lateral Epicondyle Fascial Release                                                                                          Post Operative Protocol    Phase I - Maximum Protection (0 to 10 Days):    O to 10 Days:  Complete Immobilization in 90° Splint  Sling For 2 Weeks  Ice Continuously    Phase II - Progressive Stretching and Active Motion (10 Days to 4 Weeks):    10 Days to 2 Weeks:  Discontinue sling at 2 weeks  Modalities as needed for inflammation  Begin passive elbow and wrist range of motion in all planes as tolerated  Begin active shoulder protraction/retraction    Weeks 2 to 4:  Maintain program as outlined in weeks 0 to 2  Continue modalities to control inflammation  Initiate terminal range of motion stretching as tolerated  Begin active-assistive range of motion in elbow, wrist, and hand in all planes    Phase III - Early Strengthening (Weeks 4 to 6):    Weeks 4 to 6:  Modalities as needed  Continue with elbow and wrist terminal stretching in all planes  Begin active range of motion of the elbow and wrist in all planes  Initiate submaximal isometrics of the extensor bundle  Begin PREs of the flexor/pronator mass  Begin rotator cuff and scapular strengthening program  Scapular stabilization exercises  Proprioception and neuromuscular control drills  Manual resistance and PNF patterns    Phase IV - Advanced Strengthening and Plyometric Drills (Week 6 to 10):    Weeks 6 to 8:  Continue with end range stretching  Begin wrist and forearm strengthening in all planes, avoiding aggressive wrist extension exercises until week 10 to 12.    Weeks 8 to 10:  Begin global upper extremity gym strengthening program 3 to 4 times per week    Weeks 10 to 12:  Advance intensity of forearm and hand strengthening, including wrist extension  Initiate closed kinetic chain strengthening in protected range  Push-up progression  Seated serratus push-ups  Initiate Plyometric Drills  Plyoball wall drills  Double arm  rebounder drills progressing to single arm

## 2024-07-23 NOTE — DISCHARGE SUMMARY
[facility] - Orthopedic Periop Services  Discharge Note  Short Stay    Procedure(s) (LRB):  REINSERTION, TENDON, BICEPS OR TRICEPS, RUPTURED, AT ELBOW (Right)  TENOTOMY, ELBOW, WITH DEBRIDEMENT (Right)      OUTCOME: Patient tolerated treatment/procedure well without complication and is now ready for discharge.    DISPOSITION: Home or Self Care    FINAL DIAGNOSIS:  Partial tear of common extensor tendon of right elbow    FOLLOWUP: In clinic    DISCHARGE INSTRUCTIONS:    Discharge Procedure Orders   Diet general     Keep surgical extremity elevated     Leave dressing on - Keep it clean, dry, and intact until clinic visit     Call MD for:  temperature >100.4     Call MD for:  persistent nausea and vomiting     Call MD for:  severe uncontrolled pain     Call MD for:  difficulty breathing, headache or visual disturbances     Call MD for:  redness, tenderness, or signs of infection (pain, swelling, redness, odor or green/yellow discharge around incision site)     Call MD for:  hives     Call MD for:  persistent dizziness or light-headedness     Call MD for:  extreme fatigue        TIME SPENT ON DISCHARGE: 9 minutes   [Negative] : Endocrine [FreeTextEntry6] : COPD [de-identified] : History of melanoma [FreeTextEntry1] : Increased risk of breast cancer

## 2024-07-24 NOTE — ANESTHESIA POSTPROCEDURE EVALUATION
Anesthesia Post Evaluation    Patient: Mikhail Stanford    Procedure(s) Performed: Procedure(s) (LRB):  REINSERTION, TENDON, BICEPS OR TRICEPS, RUPTURED, AT ELBOW (Right)  TENOTOMY, ELBOW, WITH DEBRIDEMENT (Right)    Final Anesthesia Type: general      Patient location during evaluation: PACU  Patient participation: Yes- Able to Participate  Level of consciousness: awake and alert  Post-procedure vital signs: reviewed and stable  Pain management: adequate  Airway patency: patent  KATIE mitigation strategies: Multimodal analgesia  PONV status at discharge: No PONV  Anesthetic complications: no      Cardiovascular status: blood pressure returned to baseline  Respiratory status: unassisted  Hydration status: euvolemic  Follow-up not needed.              Vitals Value Taken Time   /93 07/22/24 1204   Temp 36.6 °C (97.8 °F) 07/22/24 1028   Pulse 70 07/22/24 1244   Resp 18 07/22/24 1204   SpO2 88 % 07/22/24 1244   Vitals shown include unfiled device data.      Event Time   Out of Recovery 10:55:00         Pain/Bee Score: No data recorded

## 2024-07-29 ENCOUNTER — ANESTHESIA (OUTPATIENT)
Dept: GASTROENTEROLOGY | Facility: HOSPITAL | Age: 69
End: 2024-07-29
Payer: COMMERCIAL

## 2024-07-29 ENCOUNTER — ANESTHESIA EVENT (OUTPATIENT)
Dept: GASTROENTEROLOGY | Facility: HOSPITAL | Age: 69
End: 2024-07-29
Payer: COMMERCIAL

## 2024-07-29 ENCOUNTER — HOSPITAL ENCOUNTER (OUTPATIENT)
Dept: GASTROENTEROLOGY | Facility: HOSPITAL | Age: 69
Discharge: HOME | End: 2024-07-29
Attending: FAMILY MEDICINE | Admitting: INTERNAL MEDICINE
Payer: COMMERCIAL

## 2024-07-29 VITALS
HEIGHT: 71 IN | HEART RATE: 100 BPM | OXYGEN SATURATION: 96 % | SYSTOLIC BLOOD PRESSURE: 90 MMHG | RESPIRATION RATE: 16 BRPM | BODY MASS INDEX: 32.2 KG/M2 | WEIGHT: 230 LBS | DIASTOLIC BLOOD PRESSURE: 62 MMHG | TEMPERATURE: 97 F

## 2024-07-29 DIAGNOSIS — K21.9 GASTROESOPHAGEAL REFLUX DISEASE, UNSPECIFIED WHETHER ESOPHAGITIS PRESENT: ICD-10-CM

## 2024-07-29 DIAGNOSIS — Z12.11 COLON CANCER SCREENING: ICD-10-CM

## 2024-07-29 PROCEDURE — 63600175 PHARM REV CODE 636 W HCPCS: Performed by: NURSE ANESTHETIST, CERTIFIED REGISTERED

## 2024-07-29 PROCEDURE — 37000009 HC ANESTHESIA EA ADD 15 MINS

## 2024-07-29 PROCEDURE — 25000003 PHARM REV CODE 250: Performed by: NURSE ANESTHETIST, CERTIFIED REGISTERED

## 2024-07-29 PROCEDURE — 88305 TISSUE EXAM BY PATHOLOGIST: CPT | Mod: TC,SUR | Performed by: INTERNAL MEDICINE

## 2024-07-29 PROCEDURE — 37000008 HC ANESTHESIA 1ST 15 MINUTES

## 2024-07-29 PROCEDURE — 45385 COLONOSCOPY W/LESION REMOVAL: CPT | Mod: PT,,, | Performed by: INTERNAL MEDICINE

## 2024-07-29 PROCEDURE — 45380 COLONOSCOPY AND BIOPSY: CPT | Mod: PT,59 | Performed by: INTERNAL MEDICINE

## 2024-07-29 PROCEDURE — 45380 COLONOSCOPY AND BIOPSY: CPT | Mod: PT,59,, | Performed by: INTERNAL MEDICINE

## 2024-07-29 PROCEDURE — 45385 COLONOSCOPY W/LESION REMOVAL: CPT | Mod: PT | Performed by: INTERNAL MEDICINE

## 2024-07-29 PROCEDURE — 27201423 OPTIME MED/SURG SUP & DEVICES STERILE SUPPLY

## 2024-07-29 PROCEDURE — 88305 TISSUE EXAM BY PATHOLOGIST: CPT | Mod: 26,,, | Performed by: PATHOLOGY

## 2024-07-29 RX ORDER — LIDOCAINE HYDROCHLORIDE 20 MG/ML
INJECTION, SOLUTION EPIDURAL; INFILTRATION; INTRACAUDAL; PERINEURAL
Status: DISCONTINUED | OUTPATIENT
Start: 2024-07-29 | End: 2024-07-29

## 2024-07-29 RX ORDER — PROPOFOL 10 MG/ML
VIAL (ML) INTRAVENOUS
Status: DISCONTINUED | OUTPATIENT
Start: 2024-07-29 | End: 2024-07-29

## 2024-07-29 RX ORDER — SODIUM CHLORIDE 0.9 % (FLUSH) 0.9 %
10 SYRINGE (ML) INJECTION
Status: DISCONTINUED | OUTPATIENT
Start: 2024-07-29 | End: 2024-07-30 | Stop reason: HOSPADM

## 2024-07-29 RX ADMIN — PROPOFOL 50 MG: 10 INJECTION, EMULSION INTRAVENOUS at 08:07

## 2024-07-29 RX ADMIN — SODIUM CHLORIDE: 9 INJECTION, SOLUTION INTRAVENOUS at 08:07

## 2024-07-29 RX ADMIN — LIDOCAINE HYDROCHLORIDE 50 MG: 20 INJECTION, SOLUTION INTRAVENOUS at 08:07

## 2024-07-29 NOTE — H&P
Gastroenterology Pre-procedure H&P    History of Present Illness    Mikhail Stanford is a 69 y.o. male that  has a past medical history of COPD (chronic obstructive pulmonary disease), Hypertension, and Sleep apnea.     Patient here for routine surveillance    Patient denies wt loss, abdominal pain, diarrhea, melena/hematochezia, change in stool caliber, no anticoagulants, FMHx of GI related malignancies.      Past Medical History:   Diagnosis Date    COPD (chronic obstructive pulmonary disease)     Hypertension     Sleep apnea        Past Surgical History:   Procedure Laterality Date    CATARACT EXTRACTION Bilateral 2024    DENTAL SURGERY      EXCISION OF LESION OF LIP N/A 2023    Procedure: EXCISION, LESION, LIP;  Surgeon: Kuldeep Yao MD;  Location: [facility] OR;  Service: ENT;  Laterality: N/A;    HERNIA REPAIR  2020    LEFT HEART CATHETERIZATION N/A 07/10/2023    Procedure: Left heart cath;  Surgeon: Jeremias Simmons MD;  Location: [facility] CATH LAB;  Service: Cardiology;  Laterality: N/A;    REINSERTION, TENDON, BICEPS OR TRICEPS, RUPTURED, AT ELBOW Right 2024    Procedure: REINSERTION, TENDON, BICEPS OR TRICEPS, RUPTURED, AT ELBOW;  Surgeon: Jourdan Winslow MD;  Location: [facility] OR;  Service: Orthopedics;  Laterality: Right;    TENOTOMY, ELBOW, WITH DEBRIDEMENT Right 2024    Procedure: TENOTOMY, ELBOW, WITH DEBRIDEMENT;  Surgeon: Jourdan Winslow MD;  Location: [facility] OR;  Service: Orthopedics;  Laterality: Right;       Family History   Problem Relation Name Age of Onset    Cancer Mother      Diabetes Sister      Hyperlipidemia Sister         Social History     Socioeconomic History    Marital status:    Tobacco Use    Smoking status: Former     Current packs/day: 0.00     Average packs/day: 2.0 packs/day for 40.0 years (80.0 ttl pk-yrs)     Types: Cigarettes     Start date:      Quit date: 2006     Years since quittin.5     Passive exposure:  "Past    Smokeless tobacco: Former     Types: Chew     Quit date: 1/27/2006   Substance and Sexual Activity    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    Drug use: Not Currently     Types: Marijuana, "Crack" cocaine, Heroin     Comment: 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 Years    Sexual activity: Yes     Partners: Female     Social Determinants of Health     Financial Resource Strain: Low Risk  (7/12/2024)    Overall Financial Resource Strain (CARDIA)     Difficulty of Paying Living Expenses: Not hard at all   Food Insecurity: No Food Insecurity (7/12/2024)    Hunger Vital Sign     Worried About Running Out of Food in the Last Year: Never true     Ran Out of Food in the Last Year: Never true   Physical Activity: Unknown (7/12/2024)    Exercise Vital Sign     Days of Exercise per Week: 0 days   Stress: No Stress Concern Present (7/12/2024)    [employer] [place] [employer] - Occupational Stress Questionnaire     Feeling of Stress : Not at all   Housing Stability: Unknown (7/12/2024)    Housing Stability Vital Sign     Unable to Pay for Housing in the Last Year: No       Current Outpatient Medications   Medication Sig Dispense Refill    albuterol-budesonide (AIRSUPRA) 90-80 mcg/actuation Use 2 puffs every 6 hours as needed for shortness a breath 21.14 g 11    atorvastatin (LIPITOR) 40 MG tablet TAKE 1 TABLET DAILY 90 tablet 0    BREZTRI AEROSPHERE 160-9-4.8 mcg/actuation HFAA Inhale into the lungs.      brimonidine 0.2% (ALPHAGAN) 0.2 % Drop Place 1 drop into the left eye 2 (two) times a day.      celecoxib (CELEBREX) 200 MG capsule Take 1 capsule (200 mg total) by mouth 2 (two) times daily. 60 capsule 0    chlorhexidine (PERIDEX) 0.12 % solution 5 mLs 3 (three) times daily.      dapagliflozin propanediol (FARXIGA) 10 mg tablet TAKE 1 TABLET ONCE DAILY 90 tablet 3    esomeprazole (NEXIUM) 40 MG capsule TAKE 1 CAPSULE BY MOUTH EVERY DAY 90 capsule 1    ezetimibe (ZETIA) 10 mg tablet TAKE 1 TABLET DAILY 90 tablet 0    " "fluticasone propionate (FLONASE) 50 mcg/actuation nasal spray USE 2 SPRAYS IN EACH       NOSTRIL ONCE DAILY 48 g 1    ondansetron (ZOFRAN-ODT) 4 MG TbDL Take 1 tablet (4 mg total) by mouth every 8 (eight) hours as needed. 30 tablet 0    oxyCODONE-acetaminophen (PERCOCET)  mg per tablet Take 1 tablet by mouth every 6 (six) hours as needed for Pain. 30 tablet 0    tadalafiL (CIALIS) 20 MG Tab Take 1 tablet (20 mg total) by mouth daily as needed (intercourse). 24 tablet 3    tamsulosin (FLOMAX) 0.4 mg Cap Take one capsule twice a day 180 capsule 3    tiotropium (SPIRIVA) 18 mcg inhalation capsule 1 capsule. (Patient not taking: Reported on 7/9/2024)      valsartan-hydrochlorothiazide (DIOVAN-HCT) 160-12.5 mg per tablet 1 tablet Orally Once a day 90 tablet 1    vibegron (GEMTESA) 75 mg Tab Take 75 mg by mouth Daily. Patient given sample medication for 2 weeks Exp date 4/30/2025, Lot # [id number]       No current facility-administered medications for this encounter.       Review of patient's allergies indicates:  No Known Allergies    Objective:  Vitals:    07/29/24 0739   BP: 136/81   Pulse: 72   Resp: (!) 23   Temp: 98.3 °F (36.8 °C)   TempSrc: Oral   SpO2: 96%   Weight: 104.3 kg (230 lb)   Height: 5' 11" (1.803 m)        GEN: normal appearing, NAD, AAO x3  HENT: NCAT, anicteric, OP benign  CV: normal rate, regular rhythm  RESP: NABS, symmetric rise, unlabored  ABD: soft, ND, no guarding or TTP  SKIN: warm and dry  NEURO: grossly afocal    Assessment and Plan:    Proceed with:    Colonoscopy for previous adenomatous polyp, screening for colon cancer    Fer Avila MD  Gastroenterology  "

## 2024-07-29 NOTE — TRANSFER OF CARE
"Anesthesia Transfer of Care Note    Patient: Mikhail Stanford    Procedure(s) Performed: Colonoscopy    Patient location: GI    Anesthesia Type: general    Transport from OR: Transported from OR on room air with adequate spontaneous ventilation. Continuous ECG monitoring in transport. Continuous SpO2 monitoring in transport    Post pain: adequate analgesia    Post assessment: no apparent anesthetic complications    Post vital signs: stable    Level of consciousness: responds to stimulation, awake and sedated    Nausea/Vomiting: no nausea/vomiting    Complications: none    Transfer of care protocol was followed      Last vitals: Visit Vitals  BP 90/62 (BP Location: Right arm, Patient Position: Lying)   Pulse 100   Temp 36 °C (96.8 °F) (Oral)   Resp 16   Ht 5' 11" (1.803 m)   Wt 104.3 kg (230 lb)   SpO2 96%   BMI 32.08 kg/m²     "

## 2024-07-29 NOTE — ANESTHESIA PREPROCEDURE EVALUATION
"                                                                                                             2024  Mikhail Stanford is a 69 y.o., male.    Past Medical History:   Diagnosis Date    COPD (chronic obstructive pulmonary disease)     Hypertension     Sleep apnea        Past Surgical History:   Procedure Laterality Date    CATARACT EXTRACTION Bilateral 2024    DENTAL SURGERY      EXCISION OF LESION OF LIP N/A 2023    Procedure: EXCISION, LESION, LIP;  Surgeon: Kuldeep Yao MD;  Location: [facility] OR;  Service: ENT;  Laterality: N/A;    HERNIA REPAIR  2020    LEFT HEART CATHETERIZATION N/A 07/10/2023    Procedure: Left heart cath;  Surgeon: Jeremias Simmons MD;  Location: [facility] CATH LAB;  Service: Cardiology;  Laterality: N/A;    REINSERTION, TENDON, BICEPS OR TRICEPS, RUPTURED, AT ELBOW Right 2024    Procedure: REINSERTION, TENDON, BICEPS OR TRICEPS, RUPTURED, AT ELBOW;  Surgeon: Jourdan Winslow MD;  Location: [facility] OR;  Service: Orthopedics;  Laterality: Right;    TENOTOMY, ELBOW, WITH DEBRIDEMENT Right 2024    Procedure: TENOTOMY, ELBOW, WITH DEBRIDEMENT;  Surgeon: Jourdan Winslow MD;  Location: [facility] OR;  Service: Orthopedics;  Laterality: Right;       Family History   Problem Relation Name Age of Onset    Cancer Mother      Diabetes Sister      Hyperlipidemia Sister         Social History     Socioeconomic History    Marital status:    Tobacco Use    Smoking status: Former     Current packs/day: 0.00     Average packs/day: 2.0 packs/day for 40.0 years (80.0 ttl pk-yrs)     Types: Cigarettes     Start date:      Quit date:      Years since quittin.5     Passive exposure: Past    Smokeless tobacco: Former     Types: Chew     Quit date: 2006   Substance and Sexual Activity    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    Drug use: Not Currently     Types: Marijuana, "Crack" cocaine, Heroin     Comment: " Clean 30 Years    Sexual activity: Yes     Partners: Female     Social Determinants of Health     Financial Resource Strain: Low Risk  (7/12/2024)    Overall Financial Resource Strain (CARDIA)     Difficulty of Paying Living Expenses: Not hard at all   Food Insecurity: No Food Insecurity (7/12/2024)    Hunger Vital Sign     Worried About Running Out of Food in the Last Year: Never true     Ran Out of Food in the Last Year: Never true   Physical Activity: Unknown (7/12/2024)    Exercise Vital Sign     Days of Exercise per Week: 0 days   Stress: No Stress Concern Present (7/12/2024)    [employer] [place] [employer] - Occupational Stress Questionnaire     Feeling of Stress : Not at all   Housing Stability: Unknown (7/12/2024)    Housing Stability Vital Sign     Unable to Pay for Housing in the Last Year: No       Current Outpatient Medications   Medication Sig Dispense Refill    albuterol-budesonide (AIRSUPRA) 90-80 mcg/actuation Use 2 puffs every 6 hours as needed for shortness a breath 21.14 g 11    atorvastatin (LIPITOR) 40 MG tablet TAKE 1 TABLET DAILY 90 tablet 0    BREZTRI AEROSPHERE 160-9-4.8 mcg/actuation HFAA Inhale into the lungs.      brimonidine 0.2% (ALPHAGAN) 0.2 % Drop Place 1 drop into the left eye 2 (two) times a day.      celecoxib (CELEBREX) 200 MG capsule Take 1 capsule (200 mg total) by mouth 2 (two) times daily. 60 capsule 0    chlorhexidine (PERIDEX) 0.12 % solution 5 mLs 3 (three) times daily.      dapagliflozin propanediol (FARXIGA) 10 mg tablet TAKE 1 TABLET ONCE DAILY 90 tablet 3    esomeprazole (NEXIUM) 40 MG capsule TAKE 1 CAPSULE BY MOUTH EVERY DAY 90 capsule 1    ezetimibe (ZETIA) 10 mg tablet TAKE 1 TABLET DAILY 90 tablet 0    fluticasone propionate (FLONASE) 50 mcg/actuation nasal spray USE 2 SPRAYS IN EACH       NOSTRIL ONCE DAILY 48 g 1    ondansetron (ZOFRAN-ODT) 4 MG TbDL Take 1 tablet (4 mg total) by mouth every 8 (eight) hours as needed. 30 tablet 0     oxyCODONE-acetaminophen (PERCOCET)  mg per tablet Take 1 tablet by mouth every 6 (six) hours as needed for Pain. 30 tablet 0    tadalafiL (CIALIS) 20 MG Tab Take 1 tablet (20 mg total) by mouth daily as needed (intercourse). 24 tablet 3    tamsulosin (FLOMAX) 0.4 mg Cap Take one capsule twice a day 180 capsule 3    tiotropium (SPIRIVA) 18 mcg inhalation capsule 1 capsule. (Patient not taking: Reported on 7/9/2024)      valsartan-hydrochlorothiazide (DIOVAN-HCT) 160-12.5 mg per tablet 1 tablet Orally Once a day 90 tablet 1    vibegron (GEMTESA) 75 mg Tab Take 75 mg by mouth Daily. Patient given sample medication for 2 weeks Exp date 4/30/2025, Lot # [id number]       No current facility-administered medications for this encounter.       Review of patient's allergies indicates:  No Known Allergies  Patient Active Problem List   Diagnosis    SOB (shortness of breath)    Essential hypertension    Chronic obstructive pulmonary disease    Lung mass    GERD (gastroesophageal reflux disease)    Lip lesion    Stage 3a chronic kidney disease    Renal insufficiency    Hypertensive nephrosclerosis    Physical deconditioning    Proteinuria    Chronic kidney disease, stage 2 (mild)    Benign prostatic hyperplasia with incomplete bladder emptying    Nocturia    Urgency of urination    Frequency of urination    Weak urinary stream    Partial tear of common extensor tendon of right elbow       Pre-op Assessment    I have reviewed the Patient Summary Reports.     I have reviewed the Nursing Notes. I have reviewed the NPO Status.   I have reviewed the Medications.     Review of Systems  Anesthesia Hx:  No problems with previous Anesthesia                Cardiovascular:     Hypertension                     Shortness of Breath                    Hypertension         Pulmonary:   COPD   Shortness of breath  Sleep Apnea    Chronic Obstructive Pulmonary Disease (COPD):           Obstructive Sleep Apnea (KATIE).           Renal/:  Chronic  Renal Disease        Kidney Function/Disease             Hepatic/GI:     GERD      Gerd              Physical Exam  General: Well nourished, Alert, Oriented and Cooperative    Airway:  Mallampati: III   Mouth Opening: Normal  Neck ROM: Normal ROM    Dental:  Intact    Chest/Lungs:  Normal Respiratory Rate    Heart:  Rate: Normal        Anesthesia Plan  Type of Anesthesia, risks & benefits discussed:    Anesthesia Type: Gen Natural Airway, MAC  Intra-op Monitoring Plan: Standard ASA Monitors  Post Op Pain Control Plan: multimodal analgesia and IV/PO Opioids PRN  Induction:  IV  Informed Consent: Informed consent signed with the Patient and all parties understand the risks and agree with anesthesia plan.  All questions answered. Patient consented to blood products? Yes  ASA Score: 3  Day of Surgery Review of History & Physical: I have interviewed and examined the patient. I have reviewed the patient's H&P dated: There are no significant changes.     Ready For Surgery From Anesthesia Perspective.     .

## 2024-07-29 NOTE — ANESTHESIA POSTPROCEDURE EVALUATION
Anesthesia Post Evaluation    Patient: Mikhail Stanford    Procedure(s) Performed: Colonoscopy    Final Anesthesia Type: general      Patient location during evaluation: GI PACU  Patient participation: Yes- Able to Participate  Level of consciousness: awake and alert and oriented  Post-procedure vital signs: reviewed and stable  Pain management: adequate  Airway patency: patent    PONV status at discharge: No PONV  Anesthetic complications: no      Cardiovascular status: blood pressure returned to baseline and stable  Respiratory status: unassisted, spontaneous ventilation and room air  Hydration status: euvolemic  Follow-up not needed.  Comments: Refer to nursing note for pain/ines score upon discharge from recovery.               Vitals Value Taken Time   BP 90/62 07/29/24 0833   Temp 36 °C (96.8 °F) 07/29/24 0833   Pulse 100 07/29/24 0833   Resp 16 07/29/24 0833   SpO2 96 % 07/29/24 0833         No case tracking events are documented in the log.      Pain/Ines Score: No data recorded

## 2024-07-29 NOTE — DISCHARGE INSTRUCTIONS
Procedure Date  7/29/24     Impression  Overall Impression:   5 subcentimeter polyps were removed  One polyp measuring 10-19 mm in the rectum; performed hot snare removal  (grade 2) hemorrhoids  The ileocecal valve, cecum and ascending colon appeared normal.        Recommendation  Await pathology results   Repeat colonoscopy in 3 years         Outcome of procedure: successful Colonoscopy  Disposition: patient to recovery following procedure; discharge to home when appropriate parameters met  Provisions for follow up: please call my office for any unexpected symptoms like chest or abdominal pain or bleeding following your procedure.  Final Diagnosis: colon polyps      Indication  Colon cancer screening

## 2024-07-30 RX ORDER — ESOMEPRAZOLE MAGNESIUM 40 MG/1
40 CAPSULE, DELAYED RELEASE ORAL DAILY
Qty: 90 CAPSULE | Refills: 1 | Status: SHIPPED | OUTPATIENT
Start: 2024-07-30

## 2024-07-30 NOTE — PROGRESS NOTES
Dr. Ballesteros, thank you for referring this patient to me. I recommend repeat colonoscopy in 3 years. Please let me know if you have any questions regarding this patient.

## 2024-08-05 ENCOUNTER — OFFICE VISIT (OUTPATIENT)
Dept: ORTHOPEDICS | Facility: CLINIC | Age: 69
End: 2024-08-05
Payer: COMMERCIAL

## 2024-08-05 DIAGNOSIS — Z98.890 S/P TENDON REPAIR: Primary | ICD-10-CM

## 2024-08-05 PROCEDURE — 99999 PR PBB SHADOW E&M-EST. PATIENT-LVL III: CPT | Mod: PBBFAC,,, | Performed by: NURSE PRACTITIONER

## 2024-08-05 PROCEDURE — 99213 OFFICE O/P EST LOW 20 MIN: CPT | Mod: PBBFAC | Performed by: NURSE PRACTITIONER

## 2024-08-05 PROCEDURE — 99024 POSTOP FOLLOW-UP VISIT: CPT | Mod: ,,, | Performed by: NURSE PRACTITIONER

## 2024-08-05 RX ORDER — IBUPROFEN 800 MG/1
800 TABLET ORAL 3 TIMES DAILY
Qty: 30 TABLET | Refills: 0 | Status: SHIPPED | OUTPATIENT
Start: 2024-08-05

## 2024-08-19 DIAGNOSIS — E78.5 HYPERLIPIDEMIA, UNSPECIFIED HYPERLIPIDEMIA TYPE: ICD-10-CM

## 2024-08-19 RX ORDER — EZETIMIBE 10 MG/1
TABLET ORAL
Qty: 90 TABLET | Refills: 1 | Status: SHIPPED | OUTPATIENT
Start: 2024-08-19

## 2024-09-05 ENCOUNTER — OFFICE VISIT (OUTPATIENT)
Dept: ORTHOPEDICS | Facility: CLINIC | Age: 69
End: 2024-09-05
Payer: COMMERCIAL

## 2024-09-05 ENCOUNTER — HOSPITAL ENCOUNTER (OUTPATIENT)
Dept: RADIOLOGY | Facility: HOSPITAL | Age: 69
Discharge: HOME | End: 2024-09-05
Attending: ORTHOPAEDIC SURGERY
Payer: COMMERCIAL

## 2024-09-05 DIAGNOSIS — Z98.890 S/P TENDON REPAIR: Primary | ICD-10-CM

## 2024-09-05 DIAGNOSIS — Z98.890 S/P TENDON REPAIR: ICD-10-CM

## 2024-09-05 PROCEDURE — 73070 X-RAY EXAM OF ELBOW: CPT | Mod: TC,RT

## 2024-09-05 PROCEDURE — 73070 X-RAY EXAM OF ELBOW: CPT | Mod: 26,RT,, | Performed by: ORTHOPAEDIC SURGERY

## 2024-09-05 PROCEDURE — 99999 PR PBB SHADOW E&M-EST. PATIENT-LVL II: CPT | Mod: PBBFAC,,, | Performed by: ORTHOPAEDIC SURGERY

## 2024-09-05 PROCEDURE — 99212 OFFICE O/P EST SF 10 MIN: CPT | Mod: PBBFAC,25 | Performed by: ORTHOPAEDIC SURGERY

## 2024-09-05 PROCEDURE — 99024 POSTOP FOLLOW-UP VISIT: CPT | Mod: ,,, | Performed by: ORTHOPAEDIC SURGERY

## 2024-09-05 RX ORDER — CELECOXIB 200 MG/1
200 CAPSULE ORAL 2 TIMES DAILY
Qty: 60 CAPSULE | Refills: 3 | Status: SHIPPED | OUTPATIENT
Start: 2024-09-05

## 2024-09-05 NOTE — PROGRESS NOTES
HISTORY OF PRESENT ILLNESS:       Reinsertion, Tendon, Biceps Or Triceps, Ruptured, At Elbow - Right and Tenotomy, Elbow, With Debridement - Right 7/22/2024      Pt is here today for Second post-operative followup of his Reinsertion, Tendon, Biceps Or Triceps, Ruptured, At Elbow - Right and Tenotomy, Elbow, With Debridement - Right.  he is doing well.  We have reviewed his findings and discussed plan of care and future treatment options, including the physical therapy plan.                                                                                     PHYSICAL EXAMINATION:     Incision sites healed well  No evidence of any erythema, infection or induration  Minimal effusion  2+ DP pulse satisfactory range of motion about the elbow healing surgical incision subjective numbness over the volar aspect of the forearm.  Mild swelling over the lateral epicondyle.    X-Ray Elbow 2 Views Right    Result Date: 9/5/2024  See Procedure Notes for results. IMPRESSION: Please see Ortho procedure notes for report.  This procedure was auto-finalized by: Virtual Radiologist   Two views right elbow demonstrate well-positioned hardware following distal biceps repair no significant evidence of heterotopic ossification.                                                                               ASSESSMENT:                                                                                                                                               1. Status post above, doing well.                                                                                                                               PLAN:       Wounds were treated today encouraged physical therapy.  Celebrex refilled.  Compression sleeve recommended.  See back in 6 weeks.  DVT prophylaxis discussed  Therapy plan discussed in great detail today; all questions answered.                                                                                                                                                There are no Patient Instructions on file for this visit.

## 2024-10-07 ENCOUNTER — TELEPHONE (OUTPATIENT)
Dept: PULMONOLOGY | Facility: CLINIC | Age: 69
End: 2024-10-07
Payer: COMMERCIAL

## 2024-10-07 ENCOUNTER — OFFICE VISIT (OUTPATIENT)
Dept: FAMILY MEDICINE | Facility: CLINIC | Age: 69
End: 2024-10-07
Payer: COMMERCIAL

## 2024-10-07 VITALS
OXYGEN SATURATION: 94 % | TEMPERATURE: 99 F | WEIGHT: 230 LBS | SYSTOLIC BLOOD PRESSURE: 98 MMHG | RESPIRATION RATE: 19 BRPM | HEART RATE: 84 BPM | DIASTOLIC BLOOD PRESSURE: 64 MMHG | BODY MASS INDEX: 32.2 KG/M2 | HEIGHT: 71 IN

## 2024-10-07 DIAGNOSIS — R06.02 SOB (SHORTNESS OF BREATH): ICD-10-CM

## 2024-10-07 DIAGNOSIS — J18.9 PNEUMONIA OF RIGHT LOWER LOBE DUE TO INFECTIOUS ORGANISM: Primary | ICD-10-CM

## 2024-10-07 DIAGNOSIS — I10 HYPERTENSION, UNSPECIFIED TYPE: ICD-10-CM

## 2024-10-07 DIAGNOSIS — E78.5 HYPERLIPIDEMIA, UNSPECIFIED HYPERLIPIDEMIA TYPE: ICD-10-CM

## 2024-10-07 DIAGNOSIS — Z01.818 PREPROCEDURAL EXAMINATION: ICD-10-CM

## 2024-10-07 DIAGNOSIS — R94.31 ABNORMAL ECG: ICD-10-CM

## 2024-10-07 LAB
ALBUMIN SERPL BCP-MCNC: 3.4 G/DL (ref 3.5–5)
ALBUMIN/GLOB SERPL: 1.1 {RATIO}
ALP SERPL-CCNC: 66 U/L (ref 45–115)
ALT SERPL W P-5'-P-CCNC: 38 U/L (ref 16–61)
ANION GAP SERPL CALCULATED.3IONS-SCNC: 11 MMOL/L (ref 7–16)
AST SERPL W P-5'-P-CCNC: 22 U/L (ref 15–37)
BASOPHILS # BLD AUTO: 0.04 K/UL (ref 0–0.2)
BASOPHILS NFR BLD AUTO: 0.8 % (ref 0–1)
BILIRUB SERPL-MCNC: 0.8 MG/DL (ref ?–1.2)
BUN SERPL-MCNC: 24 MG/DL (ref 7–18)
BUN/CREAT SERPL: 14 (ref 6–20)
CALCIUM SERPL-MCNC: 9.1 MG/DL (ref 8.5–10.1)
CHLORIDE SERPL-SCNC: 108 MMOL/L (ref 98–107)
CHOLEST SERPL-MCNC: 144 MG/DL (ref 0–200)
CHOLEST/HDLC SERPL: 2.3 {RATIO}
CO2 SERPL-SCNC: 25 MMOL/L (ref 21–32)
CREAT SERPL-MCNC: 1.7 MG/DL (ref 0.7–1.3)
DIFFERENTIAL METHOD BLD: ABNORMAL
EGFR (NO RACE VARIABLE) (RUSH/TITUS): 44 ML/MIN/1.73M2
EKG 12-LEAD: NORMAL
EOSINOPHIL # BLD AUTO: 0.09 K/UL (ref 0–0.5)
EOSINOPHIL NFR BLD AUTO: 1.8 % (ref 1–4)
ERYTHROCYTE [DISTWIDTH] IN BLOOD BY AUTOMATED COUNT: 12.1 % (ref 11.5–14.5)
GLOBULIN SER-MCNC: 3 G/DL (ref 2–4)
GLUCOSE SERPL-MCNC: 114 MG/DL (ref 74–106)
HCT VFR BLD AUTO: 48.4 % (ref 40–54)
HDLC SERPL-MCNC: 62 MG/DL (ref 40–60)
HGB BLD-MCNC: 15.3 G/DL (ref 13.5–18)
IMM GRANULOCYTES # BLD AUTO: 0.01 K/UL (ref 0–0.04)
IMM GRANULOCYTES NFR BLD: 0.2 % (ref 0–0.4)
LDLC SERPL CALC-MCNC: 64 MG/DL
LDLC/HDLC SERPL: 1 {RATIO}
LYMPHOCYTES # BLD AUTO: 1.25 K/UL (ref 1–4.8)
LYMPHOCYTES NFR BLD AUTO: 25.5 % (ref 27–41)
MCH RBC QN AUTO: 29.1 PG (ref 27–31)
MCHC RBC AUTO-ENTMCNC: 31.6 G/DL (ref 32–36)
MCV RBC AUTO: 92 FL (ref 80–96)
MONOCYTES # BLD AUTO: 0.5 K/UL (ref 0–0.8)
MONOCYTES NFR BLD AUTO: 10.2 % (ref 2–6)
MPC BLD CALC-MCNC: 10.3 FL (ref 9.4–12.4)
NEUTROPHILS # BLD AUTO: 3.01 K/UL (ref 1.8–7.7)
NEUTROPHILS NFR BLD AUTO: 61.5 % (ref 53–65)
NONHDLC SERPL-MCNC: 82 MG/DL
NRBC # BLD AUTO: 0 X10E3/UL
NRBC, AUTO (.00): 0 %
PLATELET # BLD AUTO: 194 K/UL (ref 150–400)
POTASSIUM SERPL-SCNC: 4.1 MMOL/L (ref 3.5–5.1)
PR INTERVAL: NORMAL
PROT SERPL-MCNC: 6.4 G/DL (ref 6.4–8.2)
PRT AXES: NORMAL
QRS DURATION: NORMAL
QT/QTC: NORMAL
RBC # BLD AUTO: 5.26 M/UL (ref 4.6–6.2)
SODIUM SERPL-SCNC: 140 MMOL/L (ref 136–145)
TRIGL SERPL-MCNC: 88 MG/DL (ref 35–150)
VENTRICULAR RATE: NORMAL
VLDLC SERPL-MCNC: 18 MG/DL
WBC # BLD AUTO: 4.9 K/UL (ref 4.5–11)

## 2024-10-07 PROCEDURE — 80061 LIPID PANEL: CPT | Mod: ,,, | Performed by: CLINICAL MEDICAL LABORATORY

## 2024-10-07 PROCEDURE — 99214 OFFICE O/P EST MOD 30 MIN: CPT | Mod: 25,,, | Performed by: FAMILY MEDICINE

## 2024-10-07 PROCEDURE — 96372 THER/PROPH/DIAG INJ SC/IM: CPT | Mod: 59,,, | Performed by: FAMILY MEDICINE

## 2024-10-07 PROCEDURE — 80053 COMPREHEN METABOLIC PANEL: CPT | Mod: ,,, | Performed by: CLINICAL MEDICAL LABORATORY

## 2024-10-07 PROCEDURE — 85025 COMPLETE CBC W/AUTO DIFF WBC: CPT | Mod: ,,, | Performed by: CLINICAL MEDICAL LABORATORY

## 2024-10-07 PROCEDURE — 93000 ELECTROCARDIOGRAM COMPLETE: CPT | Mod: ,,, | Performed by: FAMILY MEDICINE

## 2024-10-07 RX ORDER — CEFTRIAXONE 1 G/1
1 INJECTION, POWDER, FOR SOLUTION INTRAMUSCULAR; INTRAVENOUS
Status: COMPLETED | OUTPATIENT
Start: 2024-10-07 | End: 2024-10-07

## 2024-10-07 RX ORDER — VALSARTAN AND HYDROCHLOROTHIAZIDE 160; 12.5 MG/1; MG/1
TABLET, FILM COATED ORAL
Qty: 90 TABLET | Refills: 1 | Status: SHIPPED | OUTPATIENT
Start: 2024-10-07

## 2024-10-07 RX ORDER — ATORVASTATIN CALCIUM 40 MG/1
TABLET, FILM COATED ORAL
Qty: 90 TABLET | Refills: 1 | Status: SHIPPED | OUTPATIENT
Start: 2024-10-07

## 2024-10-07 RX ORDER — DOXYCYCLINE HYCLATE 100 MG
100 TABLET ORAL 2 TIMES DAILY
Qty: 20 TABLET | Refills: 0 | Status: SHIPPED | OUTPATIENT
Start: 2024-10-07 | End: 2024-10-07

## 2024-10-07 RX ORDER — DOXYCYCLINE HYCLATE 100 MG
100 TABLET ORAL 2 TIMES DAILY
Qty: 20 TABLET | Refills: 0 | Status: SHIPPED | OUTPATIENT
Start: 2024-10-07 | End: 2024-10-09 | Stop reason: SDUPTHER

## 2024-10-07 RX ADMIN — CEFTRIAXONE 1 G: 1 INJECTION, POWDER, FOR SOLUTION INTRAMUSCULAR; INTRAVENOUS at 09:10

## 2024-10-07 NOTE — TELEPHONE ENCOUNTER
----- Message from April sent at 10/7/2024  3:54 PM CDT -----  Regarding: CT  Who Called: Mikhail Stanford    Caller is requesting assistance/information from provider's office.    Symptoms (please be specific): CT needs to be rescheduled to the 10th          Preferred Method of Contact: Phone Call  Patient's Preferred Phone Number on File: [phone]   Best Call Back Number, if different:  Additional Information:

## 2024-10-07 NOTE — PROGRESS NOTES
Mikhail Stanford is a 69 y.o. male seen today for worsening and persistent shortness a breath both at rest and with exertion although he denies any chest discomfort.  Patient reports he is using the Breztri but hardly ever has to use the Airsupra for wheezing.       Past Medical History:   Diagnosis Date    COPD (chronic obstructive pulmonary disease)     Hypertension     Sleep apnea      Family History   Problem Relation Name Age of Onset    Cancer Mother      Diabetes Sister      Hyperlipidemia Sister       Current Outpatient Medications on File Prior to Visit   Medication Sig Dispense Refill    albuterol-budesonide (AIRSUPRA) 90-80 mcg/actuation Use 2 puffs every 6 hours as needed for shortness a breath 21.14 g 11    BREZTRI AEROSPHERE 160-9-4.8 mcg/actuation HFAA Inhale into the lungs.      brimonidine 0.2% (ALPHAGAN) 0.2 % Drop Place 1 drop into the left eye 2 (two) times a day.      celecoxib (CELEBREX) 200 MG capsule Take 1 capsule (200 mg total) by mouth 2 (two) times daily. 60 capsule 3    chlorhexidine (PERIDEX) 0.12 % solution 5 mLs 3 (three) times daily.      dapagliflozin propanediol (FARXIGA) 10 mg tablet TAKE 1 TABLET ONCE DAILY 90 tablet 3    esomeprazole (NEXIUM) 40 MG capsule Take 1 capsule (40 mg total) by mouth once daily. 90 capsule 1    ezetimibe (ZETIA) 10 mg tablet TAKE 1 TABLET DAILY 90 tablet 1    fluticasone propionate (FLONASE) 50 mcg/actuation nasal spray USE 2 SPRAYS IN EACH       NOSTRIL ONCE DAILY 48 g 1    ibuprofen (ADVIL,MOTRIN) 800 MG tablet Take 1 tablet (800 mg total) by mouth 3 (three) times daily. 30 tablet 0    ondansetron (ZOFRAN-ODT) 4 MG TbDL Take 1 tablet (4 mg total) by mouth every 8 (eight) hours as needed. 30 tablet 0    tadalafiL (CIALIS) 20 MG Tab Take 1 tablet (20 mg total) by mouth daily as needed (intercourse). 24 tablet 3    tamsulosin (FLOMAX) 0.4 mg Cap Take one capsule twice a day 180 capsule 3    [DISCONTINUED] atorvastatin (LIPITOR) 40 MG tablet TAKE 1 TABLET  DAILY 90 tablet 0    [DISCONTINUED] valsartan-hydrochlorothiazide (DIOVAN-HCT) 160-12.5 mg per tablet 1 tablet Orally Once a day 90 tablet 1    oxyCODONE-acetaminophen (PERCOCET)  mg per tablet Take 1 tablet by mouth every 6 (six) hours as needed for Pain. (Patient not taking: Reported on 10/7/2024) 30 tablet 0    tiotropium (SPIRIVA) 18 mcg inhalation capsule 1 capsule. (Patient not taking: Reported on 10/7/2024)       No current facility-administered medications on file prior to visit.     Immunization History   Administered Date(s) Administered    Influenza (FLUAD) - Quadrivalent - Adjuvanted - PF *Preferred* (65+) 12/21/2023    Pneumococcal Conjugate - 20 Valent 01/30/2023       Review of Systems   Constitutional:  Positive for malaise/fatigue. Negative for fever and weight loss.   Respiratory:  Positive for shortness of breath.    Cardiovascular:  Negative for chest pain and palpitations.   Gastrointestinal:  Negative for nausea and vomiting.   Psychiatric/Behavioral:  Negative for depression.         Vitals:    10/07/24 0851   BP: 98/64   Pulse: 84   Resp: 19   Temp: 98.6 °F (37 °C)       Physical Exam  Vitals reviewed.   Constitutional:       Appearance: Normal appearance.   HENT:      Head: Normocephalic.   Eyes:      Extraocular Movements: Extraocular movements intact.      Conjunctiva/sclera: Conjunctivae normal.      Pupils: Pupils are equal, round, and reactive to light.   Neck:      Thyroid: No thyroid mass or thyromegaly.   Cardiovascular:      Rate and Rhythm: Normal rate and regular rhythm.      Heart sounds: Normal heart sounds. No murmur heard.     No gallop.   Pulmonary:      Effort: Pulmonary effort is normal. No respiratory distress.      Breath sounds: Decreased air movement present. Decreased breath sounds and rhonchi present. No wheezing or rales.   Skin:     General: Skin is warm and dry.      Coloration: Skin is not jaundiced or pale.   Neurological:      Mental Status: He is alert.    Psychiatric:         Mood and Affect: Mood normal.         Behavior: Behavior normal.         Thought Content: Thought content normal.         Judgment: Judgment normal.          Assessment and Plan  1. SOB (shortness of breath)  -     POCT EKG 12-LEAD (Manually Resulted by Ordering Provider)  -     X-Ray Chest PA And Lateral; Future; Expected date: 10/07/2024    2. Hyperlipidemia, unspecified hyperlipidemia type  -     Lipid Panel; Future; Expected date: 10/07/2024  -     atorvastatin (LIPITOR) 40 MG tablet; TAKE 1 TABLET DAILY  Dispense: 90 tablet; Refill: 1    3. Hypertension, unspecified type  -     CBC Auto Differential; Future; Expected date: 10/07/2024  -     Comprehensive Metabolic Panel; Future; Expected date: 10/07/2024  -     valsartan-hydrochlorothiazide (DIOVAN-HCT) 160-12.5 mg per tablet; 1 tablet Orally Once a day  Dispense: 90 tablet; Refill: 1                 EKG was mildly abnormal with nonspecific ST segment elevation.                Chest x-ray was read as possible right lower lobe pneumonia        Return to clinic in one-week or as needed.  The patient will be started on doxycycline and I recommended Mucinex 600 mg plain b.i.d. with plenty of water.  Also over the next week or so I recommended the AirSupra 2 puffs at least 3 times daily.  We will also make a follow-up appointment with his patient's pulmonologist.    Health Maintenance Topics with due status: Not Due       Topic Last Completion Date    Hemoglobin A1c (Diabetic Prevention Screening) 04/21/2023    Lipid Panel 03/07/2024    Annual UACr 06/18/2024    Colorectal Cancer Screening 07/29/2024

## 2024-10-07 NOTE — TELEPHONE ENCOUNTER
NAME:  Norma Briscoe   :   1969   MRN:   [id number]     Date/Time:  2022 8:20 AM    Colonoscopy Operative Report    Procedure Type:   Colonoscopy --screening     Indications:     Screening colonoscopy  Pre-operative Diagnosis: see indication above  Post-operative Diagnosis:  See findings below  :  Stephen Leventhal, MD  Referring Provider: Darolyn Bence, NP; -Yarely Colindres MD    Exam:  Airway: clear, no airway problems anticipated  Heart: RRR, without gallops or rubs  Lungs: clear bilaterally without wheezes, crackles, or rhonchi  Abdomen: soft, nontender, nondistended, bowel sounds present  Mental Status: awake, alert and oriented to person, place and time    Sedation:  MAC anesthesia Propofol 130mg IV  Procedure Details:  After informed consent was obtained with all risks and benefits of procedure explained and preoperative exam completed, the patient was taken to the endoscopy suite and placed in the left lateral decubitus position. Upon sequential sedation as per above, a digital rectal exam was performed demonstrating internal hemorrhoids. The Olympus videocolonoscope  was inserted in the rectum and carefully advanced to the cecum, which was identified by the ileocecal valve and appendiceal orifice. The distal terminal ileum was evaluated. The quality of preparation was adequate. The colonoscope was slowly withdrawn with careful evaluation between folds. Retroflexion in the rectum was completed demonstrating internal hemorrhoids. Findings:     -Normal colonic mucosa without masses, polyps, or inflammation.  -Small grade 1 internal hemorrhoids    Specimen Removed:  None. Complications: None. EBL:  None. Impression:    -Normal colonic mucosa without masses, polyps, or inflammation.  -Small grade 1 internal hemorrhoids    Recommendations: --For colon cancer screening in this average-risk patient, colonoscopy may be repeated in 10 years. High fiber diet.   Resume normal Spoke with the patient voiced to RS appt. Appts made for the end of the month. Patient agree. Will also mail a letter. Patient understood.    medication(s). Discharge Disposition:  Home in the company of a  when able to ambulate.     Dominga Harmon MD

## 2024-10-07 NOTE — TELEPHONE ENCOUNTER
----- Message from April sent at 10/7/2024  3:54 PM CDT -----  Regarding: CT  Who Called: Mikhail Stanford    Caller is requesting assistance/information from provider's office.    Symptoms (please be specific): CT needs to be rescheduled to the 10th          Preferred Method of Contact: Phone Call  Patient's Preferred Phone Number on File: [phone]   Best Call Back Number, if different:  Additional Information:

## 2024-10-08 ENCOUNTER — TELEPHONE (OUTPATIENT)
Dept: FAMILY MEDICINE | Facility: CLINIC | Age: 69
End: 2024-10-08
Payer: COMMERCIAL

## 2024-10-08 NOTE — TELEPHONE ENCOUNTER
Pt notified that lipids are good with mildly improved renal function and he voiced understanding.

## 2024-10-08 NOTE — TELEPHONE ENCOUNTER
----- Message from Vishnu Ballesteros MD sent at 10/8/2024  7:52 AM CDT -----  Good lipids with mildly improved renal function.

## 2024-10-09 ENCOUNTER — TELEPHONE (OUTPATIENT)
Dept: FAMILY MEDICINE | Facility: CLINIC | Age: 69
End: 2024-10-09
Payer: COMMERCIAL

## 2024-10-09 ENCOUNTER — OFFICE VISIT (OUTPATIENT)
Dept: CARDIOLOGY | Facility: CLINIC | Age: 69
End: 2024-10-09
Payer: COMMERCIAL

## 2024-10-09 VITALS
WEIGHT: 234 LBS | BODY MASS INDEX: 32.76 KG/M2 | DIASTOLIC BLOOD PRESSURE: 80 MMHG | HEIGHT: 71 IN | RESPIRATION RATE: 16 BRPM | SYSTOLIC BLOOD PRESSURE: 120 MMHG | HEART RATE: 76 BPM

## 2024-10-09 DIAGNOSIS — J44.9 CHRONIC OBSTRUCTIVE PULMONARY DISEASE, UNSPECIFIED COPD TYPE: Primary | ICD-10-CM

## 2024-10-09 DIAGNOSIS — N18.31 STAGE 3A CHRONIC KIDNEY DISEASE: ICD-10-CM

## 2024-10-09 DIAGNOSIS — I10 ESSENTIAL HYPERTENSION: Primary | ICD-10-CM

## 2024-10-09 DIAGNOSIS — J18.9 PNEUMONIA OF RIGHT LOWER LOBE DUE TO INFECTIOUS ORGANISM: Primary | ICD-10-CM

## 2024-10-09 DIAGNOSIS — R07.82 INTERCOSTAL PAIN: ICD-10-CM

## 2024-10-09 DIAGNOSIS — R94.31 ABNORMAL ECG: ICD-10-CM

## 2024-10-09 DIAGNOSIS — R06.02 SOB (SHORTNESS OF BREATH): ICD-10-CM

## 2024-10-09 DIAGNOSIS — I10 ESSENTIAL HYPERTENSION: ICD-10-CM

## 2024-10-09 DIAGNOSIS — J18.9 PNEUMONIA OF RIGHT LOWER LOBE DUE TO INFECTIOUS ORGANISM: ICD-10-CM

## 2024-10-09 PROCEDURE — 99999 PR PBB SHADOW E&M-EST. PATIENT-LVL IV: CPT | Mod: PBBFAC,,, | Performed by: STUDENT IN AN ORGANIZED HEALTH CARE EDUCATION/TRAINING PROGRAM

## 2024-10-09 PROCEDURE — 99214 OFFICE O/P EST MOD 30 MIN: CPT | Mod: PBBFAC | Performed by: STUDENT IN AN ORGANIZED HEALTH CARE EDUCATION/TRAINING PROGRAM

## 2024-10-09 PROCEDURE — 99214 OFFICE O/P EST MOD 30 MIN: CPT | Mod: S$PBB,,, | Performed by: STUDENT IN AN ORGANIZED HEALTH CARE EDUCATION/TRAINING PROGRAM

## 2024-10-09 RX ORDER — LEVOFLOXACIN 500 MG/1
500 TABLET, FILM COATED ORAL DAILY
Qty: 10 TABLET | Refills: 0 | Status: SHIPPED | OUTPATIENT
Start: 2024-10-09

## 2024-10-09 RX ORDER — DOXYCYCLINE HYCLATE 100 MG
100 TABLET ORAL 2 TIMES DAILY
Qty: 20 TABLET | Refills: 0 | Status: SHIPPED | OUTPATIENT
Start: 2024-10-09
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
## 2024-10-09 NOTE — ASSESSMENT & PLAN NOTE
Chronic; despites adequate COPD therapy continue to have exertional SOB.   Stress test - 7 mins; 8 METs - normal   LHC with normal cors   ECHO with normal LVEF

## 2024-10-09 NOTE — ASSESSMENT & PLAN NOTE
Musculoskeletal vs. Pleuritic CP  Following with PCP   No further cardiac workup (C in 7/2023 - minimal CAD)

## 2024-10-09 NOTE — TELEPHONE ENCOUNTER
Called pt after getting off the phone with [facility] Danna in regards to the doxycycline rx for pt. They informed me that since the process of filling the rx of doxy, that it couldn't be cancelled. Consulted with Dr Ballesteros whom then just sent in an rx of levoquin 500mg q-daily for the pt to take for 10 days to the [facility] pharmacy. Notified pt of the new abx rx being called in, pt verbalized understanding.

## 2024-10-09 NOTE — PROGRESS NOTES
PCP: Vishnu Ballesteros MD    Referring Provider:     Subjective:   Mikhail Stanford is a 69 y.o. male with hx of HTN, HLD who presents for followup    10/9/24 - patient reports sharp left sided chest pain. Worse on deep breathing. Dr. Ballesteros prescribed antibiotics that he has not received. I reviewed his [facility] images with him and reassured him that his chest pain is unlikely related to CAD.     6/28/23 - Patient continues to have exertional fatigue and SOB despite being on therapy for COPD. His BP is low for initiation of BB or CCB    5/27/22 - Patient states he has some shortness of breath.  He does not exercise often. Blood pressure controlled.  Has follow up with Dr. Castellano     2/14/22 - Patient of Dr. Castellano. Patient reports chronic issues with shortness of breath at rest and with exertion. Associated with runny nose. Episode occur daily. For last 2 years.     Fhx: non-contributary  Shx: Denies smoking, Hx of remote drug user (>30 years). No etoh    EKG 2/14/22 - NSR - normal  ECHO 2/2022 - normal LV and size fxn. Mild LAE, mitral valve thickening without stenosis. Indeterminate diastolic dysfunction  STRESS TEST 2/14/22 - negative for ischemia.  The patient exercised for 6 minutes 43 seconds, functional capacity of 8 METS.  The patient reported shortness of breath during the stress test. Denied chest pain/pressure/tightness.     Cath Results for orders placed during the hospital encounter of 07/10/23    The coronary arteries were normal..    The left ventricular end diastolic pressure was normal.    The pre-procedure left ventricular end diastolic pressure was 10.    The estimated blood loss was none.          Lab Results   Component Value Date     10/07/2024    K 4.1 10/07/2024     (H) 10/07/2024    CO2 25 10/07/2024    BUN 24 (H) 10/07/2024    CREATININE 1.70 (H) 10/07/2024    CALCIUM 9.1 10/07/2024    ANIONGAP 11 10/07/2024    ESTGFRAFRICA 59 (L) 10/04/2021    EGFRNONAA 43 (L) 06/13/2022  "      Lab Results   Component Value Date    CHOL 144 10/07/2024    CHOL 149 03/07/2024    CHOL 157 01/31/2023     Lab Results   Component Value Date    HDL 62 (H) 10/07/2024    HDL 63 (H) 03/07/2024    HDL 65 (H) 01/31/2023     Lab Results   Component Value Date    LDLCALC 64 10/07/2024    LDLCALC 73 03/07/2024    LDLCALC 73 01/31/2023     Lab Results   Component Value Date    TRIG 88 10/07/2024    TRIG 65 03/07/2024    TRIG 96 01/31/2023     Lab Results   Component Value Date    CHOLHDL 2.3 10/07/2024    CHOLHDL 2.4 03/07/2024    CHOLHDL 2.4 01/31/2023       Lab Results   Component Value Date    WBC 4.90 10/07/2024    HGB 15.3 10/07/2024    HCT 48.4 10/07/2024    MCV 92.0 10/07/2024     10/07/2024           Review of Systems   Constitutional:  Positive for malaise/fatigue.   Respiratory:  Positive for shortness of breath. Negative for cough.    Cardiovascular:  Negative for chest pain, palpitations, orthopnea, claudication, leg swelling and PND.         Objective:   /80   Pulse 76   Resp 16   Ht 5' 11" (1.803 m)   Wt 106.1 kg (234 lb)   BMI 32.64 kg/m²     Physical Exam  Vitals and nursing note reviewed.   Cardiovascular:      Rate and Rhythm: Normal rate and regular rhythm.      Pulses: Normal pulses.      Heart sounds: Normal heart sounds.   Pulmonary:      Breath sounds: Normal breath sounds.   Musculoskeletal:      Right lower leg: No edema.      Left lower leg: No edema.   Neurological:      Mental Status: He is oriented to person, place, and time.           Assessment:     1. Chronic obstructive pulmonary disease, unspecified COPD type        2. SOB (shortness of breath)  Ambulatory referral/consult to Cardiology      3. Abnormal ECG  Ambulatory referral/consult to Cardiology      4. Essential hypertension  EKG 12-lead      5. Pneumonia of right lower lobe due to infectious organism  doxycycline (VIBRA-TABS) 100 MG tablet      6. Stage 3a chronic kidney disease        7. Intercostal pain   "              Plan:   Chronic obstructive pulmonary disease  Has restrictive pattern on his PFTs from 2021  Follows Dr. Castellano       SOB (shortness of breath)  Chronic; despites adequate COPD therapy continue to have exertional SOB.   Stress test - 7 mins; 8 METs - normal   LHC with normal cors   ECHO with normal LVEF      Stage 3a chronic kidney disease  Follows Dr. Avila    Essential hypertension  Chronic controlled    Intercostal pain  Musculoskeletal vs. Pleuritic CP  Following with PCP   No further cardiac workup (LHC in 7/2023 - minimal CAD)

## 2024-10-15 ENCOUNTER — OFFICE VISIT (OUTPATIENT)
Dept: FAMILY MEDICINE | Facility: CLINIC | Age: 69
End: 2024-10-15
Payer: COMMERCIAL

## 2024-10-15 VITALS
SYSTOLIC BLOOD PRESSURE: 136 MMHG | TEMPERATURE: 98 F | HEART RATE: 82 BPM | BODY MASS INDEX: 33.04 KG/M2 | DIASTOLIC BLOOD PRESSURE: 82 MMHG | WEIGHT: 236 LBS | OXYGEN SATURATION: 96 % | RESPIRATION RATE: 20 BRPM | HEIGHT: 71 IN

## 2024-10-15 DIAGNOSIS — J18.9 PNEUMONIA OF RIGHT LOWER LOBE DUE TO INFECTIOUS ORGANISM: Primary | ICD-10-CM

## 2024-10-15 PROCEDURE — 99213 OFFICE O/P EST LOW 20 MIN: CPT | Mod: 25,,, | Performed by: FAMILY MEDICINE

## 2024-10-15 PROCEDURE — 96372 THER/PROPH/DIAG INJ SC/IM: CPT | Mod: ,,, | Performed by: FAMILY MEDICINE

## 2024-10-15 RX ORDER — CEFTRIAXONE 1 G/1
1 INJECTION, POWDER, FOR SOLUTION INTRAMUSCULAR; INTRAVENOUS
Status: COMPLETED | OUTPATIENT
Start: 2024-10-15 | End: 2024-10-15

## 2024-10-15 RX ADMIN — CEFTRIAXONE 1 G: 1 INJECTION, POWDER, FOR SOLUTION INTRAMUSCULAR; INTRAVENOUS at 10:10
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
## 2024-10-17 ENCOUNTER — OFFICE VISIT (OUTPATIENT)
Dept: ORTHOPEDICS | Facility: CLINIC | Age: 69
End: 2024-10-17
Payer: COMMERCIAL

## 2024-10-17 DIAGNOSIS — Z98.890 S/P TENDON REPAIR: Primary | ICD-10-CM

## 2024-10-17 PROCEDURE — 99024 POSTOP FOLLOW-UP VISIT: CPT | Mod: S$PBB,,, | Performed by: ORTHOPAEDIC SURGERY

## 2024-10-17 PROCEDURE — 99999 PR PBB SHADOW E&M-EST. PATIENT-LVL II: CPT | Mod: PBBFAC,,, | Performed by: ORTHOPAEDIC SURGERY

## 2024-10-17 PROCEDURE — 99212 OFFICE O/P EST SF 10 MIN: CPT | Mod: PBBFAC | Performed by: ORTHOPAEDIC SURGERY

## 2024-10-17 NOTE — PROGRESS NOTES
Subjective     Patient ID: Mikhail Stanford is a 69 y.o. male.    Chief Complaint: No chief complaint on file.    This pleasant 69 year old male presents to the clinic as a new patient referral from Dr. DAVID Ballesteros for Enlarged Prostate.  Patient states symptoms started about one year ago.  His IPSS score is 27 that reflects incomplete bladder emptying, frequency, intermittency, urgency, weak stream, and nocturia 5 times a night.  His PVR is 033 mls.  He denies dysuria, hematuria or straining to urinate.   He denies fever, chills, nausea or vomiting.  He denies abdominal, bladder or back pain.  He denies any family history of prostate cancer.  He denies smoking or drinking alcohol.  His PSA's are WNL.  His urine culture on April 8, 2024 was negative.  He was started on Flomax 0.4 mg about 3 weeks ago.  I discussed giving the medication more time to work.  He will continue the Flomax 0.4 mg one at bedtime for another week or two and if he tolerates the medication he will increase the Flomax to one twice a day.  If he cannot tolerate the increase he will go back to one Flomax at bedtime.  We discussed if no improvement at the next visit then we will consider adding Finasteride versus the BPH work up for possible TURP.  I will see him back in the clinic in 2 months or sooner if needed.  I discussed the plan in detail with Urologist Dr. DENISA Parson and the patient and they are in agreement with the plan.  All his questions were answered at today's visit. I spent 30 minutes counseling this patient.        PSA History:   PSA was 0.554 on 04/18/2024  PSA was 0.603 on 01/31/2023  PSA was 0.415 on 10/04/2021   -----------------------------------------------------------------------------------------------------------  [May 7, 2024].      This pleasant 69 year old male presents to the clinic for follow up of BPH with LUTS.  Patient states he is still not pleased with the way he voids. Patient states symptoms started about one year  ago. His Flomax 0.4 mg was increased to one twice a day at the last visit.  He is tolerating this medication without side effects.  His IPSS score improved from 27 to 13 that reflects incomplete bladder emptying, frequency, intermittency, and nocturia 4 times a night.  His PVR is 008 mls.  He denies dysuria, hematuria, weak stream, urgency or straining to urinate.   He denies fever, chills, nausea or vomiting.  He denies abdominal, bladder or back pain.  He denies any family history of prostate cancer.  He denies smoking or drinking alcohol.  His PSA's are WNL.  His urine culture on April 8, 2024 was negative.   We discussed adding Finasteride versus the BPH work up for possible TURP.  He desires to do the BPH work up with DUNG and Cystoscopy and desires not to start the Finasteride.  This will be scheduled as outlined in the plan.  I will reculture his urine and treat if indicated. He will continue the Flomax 0.4 mg one twice a day.    I discussed the plan in detail with Urologist Dr. DENISA Parson and the patient and they are in agreement with the plan.  All his questions were answered at today's visit.         PSA History:   PSA was 0.554 on 04/18/2024  PSA was 0.603 on 01/31/2023  PSA was 0.415 on 10/04/2021   ---------------------------------------------------------------------------------------------------  [July 9, 2024].      ---------------------------------------------------------------------------------------------------------------------------------------------------------------------------------------------------------------------------------------------------------------------------------------------------------------------------------------  Above notes are notes of Mr. Ceferino Castillo from July 9, 2024.     Patient continues to void about every 1-1/2-2 hours day and night.  Patient here today for workup to see if he needs to have TURP as he has not responded favorably to alpha  blockers.      Procedures  Austen Parson Jr., MD (Physician) · Urology  07/18/24 1350  US Kidney  Performed: 07/18/24 1345  Final           Impression: No evidence of abnormality demonstrated. Ultrasound images stored and captured. Electronically signed by: Arcade Demar Date: 07/18/2024 Time: 13:48     -------------------------------------------------------------------------------------------------------------------------------------------------------------------------------------------------        Flexible cystoscopy     Preoperative diagnosis:  Benign prostatic hyperplasia with prostatism and bladder outlet obstruction     Postoperative diagnosis:  Early BPH and chronic prostatitis with minimal obstruction     Description:  The patient was placed in the supine position in the clinic cystoscopy room and prepared for flexible cystoscopy.  Urethra was anesthetized with lidocaine jelly.  No sedation was given.  The 16.5 [employer] Olympus flexible cystoscope was passed transurethrally into the bladder without problems.  Anterior urethra was clear.  Posterior urethra had some inflammatory change but prostate was short and there was not total obstruction present.  Entire prostatic urethra was only about 3 cm in length.  Bladder was only mildly trabeculated with no cellules etc..  The bladder was free of tumors, stones, diverticula or other significant problems.  Orifices were normal in size shape and position with bilateral clear efflux.  Retrograde view of the bladder neck revealed no intravesical extension of prostate tissue.  There was no evidence of real obstruction present.  Scope was removed with similar findings.  He tolerated procedure well and left for regular exam room in satisfactory condition.  I do not feel there was significant obstructive uropathy and do not recommend any surgery.    No evidence of significant obstruction found at cystoscopy.  I do not feel patient needs TURP as I do not think that  will help his current symptoms.  Patient given 3 days Cipro 500 mg b.i.d. to cover instrumentation.  Patient given samples of Gemtesa 75 mg for 2 weeks.  He will notify the office if that is helpful with reducing frequency and we will try to get him help getting the product.  Patient will have follow-up with Mr. Castillo in about 3 months.  -------------------------------------------------------------------------[July 18. 2024].   --------------------------------------------------------------------------------------------------------------------------------------------------------------------------------------------------------------------------------------------------------------------------------------------------------   The above cystoscopy note is from Dr. DENISA Parson in the EMR system.       This pleasant 69 year old male presents to the clinic for follow up of BPH with LUTS and chronic prostatitis.  Patient states he is doing better.  He is pleased with the treatment of BPH and desires to continue the current management with Flomax and Gemtesa.  He had a work up for BPH in July 2024 by Dr. DENISA Parson.  The DUNG showed No evidence of abnormality demonstrated. His Cystoscopy showed No evidence of significant obstruction found at cystoscopy.  The TURP was not recommended.  He was started on Gemtesa 75 mg at the time of the work up and is on Flomax 0.4 mg twice a day.  He is tolerating these medications without side effects.  His IPSS score is 19 that reflects incomplete bladder emptying, frequency, urgency, weak stream, and nocturia 5 times a night.  His PVR is 043 mls.  He denies dysuria, hematuria, intermittency or straining to urinate.   He denies fever, chills, nausea or vomiting.  He denies abdominal, bladder or back pain.  He denies any family history of prostate cancer.  He denies smoking or drinking alcohol.  His PSA's are WNL.  His urine culture on July 9, 2024 was negative.  He will continue the Gemtesa 75 mg  one daily.  He will continue the Flomax 0.4 mg one twice a day.  Next PSA in April 2025.   I discussed the plan in detail with the patient and he is in agreement with the plan.  All his questions were answered at today's visit.         PSA History:   PSA was 0.554 on 04/18/2024  PSA was 0.603 on 01/31/2023  PSA was 0.415 on 10/04/2021  -------------------------------------------------------------------------------------------------------------------  [October 21, 2024].                 Review of Systems   Constitutional:  Negative for activity change and fever.   HENT:  Negative for hearing loss and trouble swallowing.    Eyes:  Negative for visual disturbance.   Respiratory:  Negative for cough, shortness of breath and wheezing.    Cardiovascular:  Negative for chest pain.   Gastrointestinal:  Negative for abdominal pain, diarrhea, nausea and vomiting.   Endocrine: Negative for polyuria.   Genitourinary:  Positive for bladder incontinence, frequency and urgency. Negative for decreased urine volume, difficulty urinating, discharge, dysuria, enuresis, erectile dysfunction, flank pain, genital sores, hematuria, penile pain, penile swelling, scrotal swelling and testicular pain.        Benign prostatic hyperplasia with incomplete bladder emptying - Primary   Weak urinary stream   Musculoskeletal:  Negative for back pain and gait problem.   Integumentary:  Negative for rash.   Neurological:  Negative for speech difficulty and weakness.   Psychiatric/Behavioral:  Negative for behavioral problems and confusion.           Objective     Physical Exam  Vitals and nursing note reviewed.   Constitutional:       General: He is not in acute distress.     Appearance: Normal appearance. He is not ill-appearing, toxic-appearing or diaphoretic.   HENT:      Head: Normocephalic.   Eyes:      Extraocular Movements: Extraocular movements intact.   Cardiovascular:      Rate and Rhythm: Normal rate and regular rhythm.      Heart sounds:  Normal heart sounds.   Pulmonary:      Effort: Pulmonary effort is normal. No respiratory distress.      Breath sounds: Normal breath sounds. No wheezing, rhonchi or rales.   Abdominal:      General: Bowel sounds are normal.      Palpations: Abdomen is soft.      Tenderness: There is no abdominal tenderness. There is no right CVA tenderness, left CVA tenderness, guarding or rebound.   Musculoskeletal:         General: Normal range of motion.      Cervical back: Normal range of motion. No rigidity.   Skin:     General: Skin is warm and dry.   Neurological:      General: No focal deficit present.      Mental Status: He is alert and oriented to person, place, and time.      Motor: No weakness.      Coordination: Coordination normal.      Gait: Gait normal.   Psychiatric:         Mood and Affect: Mood normal.         Behavior: Behavior normal.         Thought Content: Thought content normal.          Assessment and Plan     Problem List Items Addressed This Visit          Renal/    Benign prostatic hyperplasia with incomplete bladder emptying - Primary    Urgency of urination    Frequency of urination    Weak urinary stream     Other Visit Diagnoses       Urge incontinence        Relevant Medications    vibegron 75 mg Tab                 Continue Tamsulosin (Flomax) 0.4 mg one twice a day   Renew and continue Vibegron (Gemtesa) 75 mg one tablet daily   Next PSA in April 2025   Follow up with Urology NP BRET Mazariegos in 6 months

## 2024-10-17 NOTE — PROGRESS NOTES
69 y.o. Male returns to clinic for a follow up visit regarding     ICD-10-CM ICD-9-CM   1. S/P tendon repair  Z98.890 V45.89        Patient is here for recheck right elbow.    States he is doing much better. He is still in therapy twice a week.       Past Medical History:   Diagnosis Date    COPD (chronic obstructive pulmonary disease)     Hypertension     Sleep apnea      Past Surgical History:   Procedure Laterality Date    CATARACT EXTRACTION Bilateral 04/2024    DENTAL SURGERY      EXCISION OF LESION OF LIP N/A 02/21/2023    Procedure: EXCISION, LESION, LIP;  Surgeon: Kuldeep Yao MD;  Location: [facility] OR;  Service: ENT;  Laterality: N/A;    HERNIA REPAIR  11/24/2020    LEFT HEART CATHETERIZATION N/A 07/10/2023    Procedure: Left heart cath;  Surgeon: Jeremias Simmons MD;  Location: [facility] CATH LAB;  Service: Cardiology;  Laterality: N/A;    REINSERTION, TENDON, BICEPS OR TRICEPS, RUPTURED, AT ELBOW Right 7/22/2024    Procedure: REINSERTION, TENDON, BICEPS OR TRICEPS, RUPTURED, AT ELBOW;  Surgeon: Jourdan Winslow MD;  Location: [facility] OR;  Service: Orthopedics;  Laterality: Right;    TENOTOMY, ELBOW, WITH DEBRIDEMENT Right 7/22/2024    Procedure: TENOTOMY, ELBOW, WITH DEBRIDEMENT;  Surgeon: Jourdan Winslow MD;  Location: [facility] OR;  Service: Orthopedics;  Laterality: Right;         PHYSICAL EXAMINATION:    Ortho/SPM Exam  Full range of motion of the right elbow full pronation supination.    IMAGING:    ASSESSMENT:      ICD-10-CM ICD-9-CM   1. S/P tendon repair  Z98.890 V45.89       PLAN:     -Findings and treatment options were discussed with the patient  -All questions answered      Doing well.  See back 3 months.  He is overall very happy with his result states his pain is much better continue to progress activity doing well    There are no Patient Instructions on file for this visit.      No orders of the defined types were placed in this encounter.        Procedures

## 2024-10-21 ENCOUNTER — OFFICE VISIT (OUTPATIENT)
Dept: UROLOGY | Facility: CLINIC | Age: 69
End: 2024-10-21
Payer: COMMERCIAL

## 2024-10-21 VITALS
OXYGEN SATURATION: 97 % | TEMPERATURE: 98 F | DIASTOLIC BLOOD PRESSURE: 78 MMHG | HEIGHT: 71 IN | BODY MASS INDEX: 33.32 KG/M2 | SYSTOLIC BLOOD PRESSURE: 124 MMHG | WEIGHT: 238 LBS | HEART RATE: 98 BPM

## 2024-10-21 DIAGNOSIS — N40.1 BENIGN PROSTATIC HYPERPLASIA WITH INCOMPLETE BLADDER EMPTYING: Primary | ICD-10-CM

## 2024-10-21 DIAGNOSIS — R39.14 BENIGN PROSTATIC HYPERPLASIA WITH INCOMPLETE BLADDER EMPTYING: Primary | ICD-10-CM

## 2024-10-21 DIAGNOSIS — R35.0 FREQUENCY OF URINATION: ICD-10-CM

## 2024-10-21 DIAGNOSIS — R39.12 WEAK URINARY STREAM: ICD-10-CM

## 2024-10-21 DIAGNOSIS — R39.15 URGENCY OF URINATION: ICD-10-CM

## 2024-10-21 DIAGNOSIS — N39.41 URGE INCONTINENCE: ICD-10-CM

## 2024-10-21 PROCEDURE — 99213 OFFICE O/P EST LOW 20 MIN: CPT | Mod: S$PBB,,, | Performed by: NURSE PRACTITIONER

## 2024-10-21 PROCEDURE — 99215 OFFICE O/P EST HI 40 MIN: CPT | Mod: PBBFAC | Performed by: NURSE PRACTITIONER

## 2024-10-21 PROCEDURE — 99999 PR PBB SHADOW E&M-EST. PATIENT-LVL V: CPT | Mod: PBBFAC,,, | Performed by: NURSE PRACTITIONER

## 2024-10-21 NOTE — PATIENT INSTRUCTIONS
Continue Tamsulosin (Flomax) 0.4 mg one twice a day   Renew and continue Vibegron (Gemtesa) 75 mg one tablet daily   Next PSA in April 2025   Follow up with Urology BELTRAN Mazariegos in 6 months

## 2024-10-29 ENCOUNTER — OFFICE VISIT (OUTPATIENT)
Dept: FAMILY MEDICINE | Facility: CLINIC | Age: 69
End: 2024-10-29
Payer: COMMERCIAL

## 2024-10-29 VITALS
SYSTOLIC BLOOD PRESSURE: 114 MMHG | RESPIRATION RATE: 19 BRPM | HEIGHT: 71 IN | HEART RATE: 77 BPM | WEIGHT: 234 LBS | DIASTOLIC BLOOD PRESSURE: 68 MMHG | BODY MASS INDEX: 32.76 KG/M2 | TEMPERATURE: 99 F | OXYGEN SATURATION: 97 %

## 2024-10-29 DIAGNOSIS — J18.9 PNEUMONIA OF RIGHT LOWER LOBE DUE TO INFECTIOUS ORGANISM: Primary | ICD-10-CM

## 2024-10-29 PROCEDURE — 99212 OFFICE O/P EST SF 10 MIN: CPT | Mod: ,,, | Performed by: FAMILY MEDICINE

## 2024-10-30 ENCOUNTER — PATIENT MESSAGE (OUTPATIENT)
Dept: ADMINISTRATIVE | Facility: HOSPITAL | Age: 69
End: 2024-10-30

## 2024-11-08 ENCOUNTER — TELEPHONE (OUTPATIENT)
Dept: FAMILY MEDICINE | Facility: CLINIC | Age: 69
End: 2024-11-08
Payer: COMMERCIAL

## 2024-11-08 NOTE — TELEPHONE ENCOUNTER
----- Message from Vishnu Ballesteros MD sent at 10/29/2024  1:06 PM CDT -----  Patient has been area that is more consistent with some collapsed lung tissue versus infiltrate.  I recommend a follow-up chest x-ray in 2 weeks.

## 2024-11-08 NOTE — TELEPHONE ENCOUNTER
Pt notified that cxr showed benign area that is more consistent with some collapsed lung tissue versus infiltrate and that Dr Ballesteros recommends repeating a cxr in 2 weeks. Pt voiced understanding and will rtc next Tuesday for repeat cxr.

## 2024-11-12 ENCOUNTER — TELEPHONE (OUTPATIENT)
Dept: FAMILY MEDICINE | Facility: CLINIC | Age: 69
End: 2024-11-12
Payer: COMMERCIAL

## 2024-11-12 NOTE — TELEPHONE ENCOUNTER
----- Message from Vishnu Ballesteros MD sent at 11/12/2024  1:22 PM CST -----  Negative for infiltrate but positive for hypoinflation with persistent atelectasis of the right lung base.  If not already started the patient needs to be set up for pulmonology.

## 2024-11-12 NOTE — TELEPHONE ENCOUNTER
Pt notified that cxr is neg for inflitrate but pos for hypoinflation with persistent atelectasis of right lung base and that Dr Ballesteros advised for him to keep his appointment with Dr Castellano on 11/27/24, he voiced understanding and agreed.

## 2024-11-12 NOTE — TELEPHONE ENCOUNTER
Pt attempted, no answer, left vm to return call. Pt has an appointment with Dr Castellano on 11/27, Dr Ballesteros notified and voiced understanding.

## 2024-11-21 ENCOUNTER — OFFICE VISIT (OUTPATIENT)
Dept: PULMONOLOGY | Facility: CLINIC | Age: 69
End: 2024-11-21
Payer: COMMERCIAL

## 2024-11-21 VITALS
DIASTOLIC BLOOD PRESSURE: 70 MMHG | SYSTOLIC BLOOD PRESSURE: 122 MMHG | HEART RATE: 98 BPM | RESPIRATION RATE: 18 BRPM | HEIGHT: 71 IN | BODY MASS INDEX: 32.34 KG/M2 | WEIGHT: 231 LBS | OXYGEN SATURATION: 98 %

## 2024-11-21 DIAGNOSIS — J30.9 ALLERGIC RHINITIS, UNSPECIFIED SEASONALITY, UNSPECIFIED TRIGGER: ICD-10-CM

## 2024-11-21 DIAGNOSIS — J44.9 CHRONIC OBSTRUCTIVE PULMONARY DISEASE, UNSPECIFIED COPD TYPE: ICD-10-CM

## 2024-11-21 DIAGNOSIS — R91.8 LUNG MASS: ICD-10-CM

## 2024-11-21 DIAGNOSIS — R91.1 SOLITARY PULMONARY NODULE: Primary | ICD-10-CM

## 2024-11-21 PROCEDURE — 99999 PR PBB SHADOW E&M-EST. PATIENT-LVL V: CPT | Mod: PBBFAC,,, | Performed by: INTERNAL MEDICINE

## 2024-11-21 PROCEDURE — 99215 OFFICE O/P EST HI 40 MIN: CPT | Mod: PBBFAC | Performed by: INTERNAL MEDICINE

## 2024-11-21 PROCEDURE — 99214 OFFICE O/P EST MOD 30 MIN: CPT | Mod: S$PBB,,, | Performed by: INTERNAL MEDICINE

## 2024-11-21 RX ORDER — FLUTICASONE PROPIONATE 50 MCG
2 SPRAY, SUSPENSION (ML) NASAL
Qty: 48 G | Refills: 1 | Status: SHIPPED | OUTPATIENT
Start: 2024-11-21

## 2024-11-21 RX ORDER — THEOPHYLLINE 300 MG/1
300 TABLET, EXTENDED RELEASE ORAL 2 TIMES DAILY
Qty: 60 TABLET | Refills: 11 | Status: SHIPPED | OUTPATIENT
Start: 2024-11-21 | End: 2025-11-21

## 2024-11-21 NOTE — ASSESSMENT & PLAN NOTE
Patient presents today with solitary pulmonary nodules was supposed to have a repeat CT he has not had it done will order seen back in a month

## 2024-11-21 NOTE — ASSESSMENT & PLAN NOTE
Patient continues to have problems with shortness of breath I am going to add theophylline to his regimen had restriction noted on his functions we have not seen any interstitial lung disease on CT will go ahead and push on with this treatment

## 2024-11-21 NOTE — PROGRESS NOTES
Subjective:       Patient ID: Mikhail Stanford is a 69 y.o. male.    Chief Complaint: Shortness of Breath (RF By Dr. Ballesteros, was suppose to have a CT back in October but did not have done. Experiencing SOB with rest and activity )    Shortness of Breath  This is a chronic problem. The current episode started more than 1 month ago. The problem has been unchanged. Pertinent negatives include no abdominal pain, chest pain, ear pain, headaches or rash. The symptoms are aggravated by exercise.     Past Medical History:   Diagnosis Date    COPD (chronic obstructive pulmonary disease)     Hypertension     Sleep apnea      Past Surgical History:   Procedure Laterality Date    CATARACT EXTRACTION Bilateral 04/2024    DENTAL SURGERY      EXCISION OF LESION OF LIP N/A 02/21/2023    Procedure: EXCISION, LESION, LIP;  Surgeon: Kuldeep Yao MD;  Location: [facility];  Service: ENT;  Laterality: N/A;    HERNIA REPAIR  11/24/2020    LEFT HEART CATHETERIZATION N/A 07/10/2023    Procedure: Left heart cath;  Surgeon: Jeremias Simmons MD;  [facility] CATH LAB;  Service: Cardiology;  Laterality: N/A;    REINSERTION, TENDON, BICEPS OR TRICEPS, RUPTURED, AT ELBOW Right 7/22/2024    Procedure: REINSERTION, TENDON, BICEPS OR TRICEPS, RUPTURED, AT ELBOW;  Surgeon: Jourdan Winslow MD;  Location: [facility];  Service: Orthopedics;  Laterality: Right;    TENOTOMY, ELBOW, WITH DEBRIDEMENT Right 7/22/2024    Procedure: TENOTOMY, ELBOW, WITH DEBRIDEMENT;  Surgeon: Jourdan Winslow MD;  Location: [facility];  Service: Orthopedics;  Laterality: Right;     Family History   Problem Relation Name Age of Onset    Cancer Mother      Diabetes Sister      Hyperlipidemia Sister       Review of patient's allergies indicates:  No Known Allergies   Social History     Tobacco Use    Smoking status: Former     Current packs/day: 0.00     Average packs/day: 2.0 packs/day for 40.0 years (80.0 ttl pk-yrs)     Types: Cigarettes     " Start date:      Quit date:      Years since quittin.9     Passive exposure: Past    Smokeless tobacco: Former     Types: Chew     Quit date: 2006    Tobacco comments:     Former smoker about 2 packs/day about 15 years ago. 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   HENT:  Negative for congestion and ear pain.    Eyes:  Negative for redness and itching.   Respiratory:  Positive for shortness of breath.    Cardiovascular:  Negative for chest pain and palpitations.   Musculoskeletal:  Negative for arthralgias and back pain.   Skin:  Negative for rash.   Gastrointestinal:  Negative for abdominal pain and abdominal distention.   Neurological:  Negative for dizziness and headaches.   Hematological:  Negative for adenopathy. Does not bruise/bleed easily.   Psychiatric/Behavioral:  Negative for confusion. The patient is not nervous/anxious.        Objective:      Physical Exam   Constitutional: He is oriented to person, place, and time. He appears well-developed and well-nourished.   HENT:   Head: Normocephalic.   Nose: Nose normal.   Mouth/Throat: Oropharynx is clear and moist.   Neck: No JVD present. No thyromegaly present.   Cardiovascular: Normal rate, regular rhythm, normal heart sounds and intact distal pulses.   Pulmonary/Chest: Normal expansion, hyperinflation, symmetric chest wall expansion, effort normal and breath sounds normal.   Abdominal: Soft. Bowel sounds are normal.   Musculoskeletal:         General: Normal range of motion.      Cervical back: Normal range of motion and neck supple.   Lymphadenopathy: No supraclavicular adenopathy is present.     He has no cervical adenopathy.   Neurological: He is alert and oriented to person, place, " "and time. He has normal reflexes.   Skin: Skin is warm and dry.   Psychiatric: He has a normal mood and affect. His behavior is normal.     Personal Diagnostic Review  none pertinent        11/21/2024    12:48 PM 10/29/2024    10:32 AM 10/21/2024     9:36 AM 10/15/2024    10:18 AM 10/9/2024     8:51 AM 10/7/2024     8:51 AM 7/29/2024     8:33 AM   Pulmonary Function Tests   SpO2 98 % 97 % 97 % 96 %  94 % 96 %   Height 5' 11" (1.803 m) 5' 11" (1.803 m) 5' 11" (1.803 m) 5' 11" (1.803 m) 5' 11" (1.803 m) 5' 11" (1.803 m)    Weight 104.8 kg (231 lb) 106.1 kg (234 lb) 108 kg (238 lb) 107 kg (236 lb) 106.1 kg (234 lb) 104.3 kg (230 lb)    BMI (Calculated) 32.2 32.7 33.2 32.9 32.7 32.1          Assessment:       1. Solitary pulmonary nodule    2. Lung mass    3. Chronic obstructive pulmonary disease, unspecified COPD type        Outpatient Encounter Medications as of 11/21/2024   Medication Sig Dispense Refill    atorvastatin (LIPITOR) 40 MG tablet TAKE 1 TABLET DAILY 90 tablet 1    BREZTRI AEROSPHERE 160-9-4.8 mcg/actuation HFAA Inhale into the lungs.      brimonidine 0.2% (ALPHAGAN) 0.2 % Drop Place 1 drop into the left eye 2 (two) times a day.      celecoxib (CELEBREX) 200 MG capsule Take 1 capsule (200 mg total) by mouth 2 (two) times daily. 60 capsule 3    chlorhexidine (PERIDEX) 0.12 % solution 5 mLs 3 (three) times daily.      dapagliflozin propanediol (FARXIGA) 10 mg tablet TAKE 1 TABLET ONCE DAILY 90 tablet 3    esomeprazole (NEXIUM) 40 MG capsule Take 1 capsule (40 mg total) by mouth once daily. 90 capsule 1    ezetimibe (ZETIA) 10 mg tablet TAKE 1 TABLET DAILY 90 tablet 1    fluticasone propionate (FLONASE) 50 mcg/actuation nasal spray USE 2 SPRAYS IN EACH       NOSTRIL ONCE DAILY 48 g 1    tadalafiL (CIALIS) 20 MG Tab Take 1 tablet (20 mg total) by mouth daily as needed (intercourse). 24 tablet 3    tamsulosin (FLOMAX) 0.4 mg Cap Take one capsule twice a day 180 capsule 3    valsartan-hydrochlorothiazide " (DIOVAN-HCT) 160-12.5 mg per tablet 1 tablet Orally Once a day 90 tablet 1    vibegron 75 mg Tab Take one tablet daily 90 tablet 3    albuterol-budesonide (AIRSUPRA) 90-80 mcg/actuation Use 2 puffs every 6 hours as needed for shortness a breath (Patient not taking: Reported on 11/21/2024) 21.14 g 11    theophylline (THEODUR) 300 MG 12 hr tablet Take 1 tablet (300 mg total) by mouth 2 (two) times daily. 60 tablet 11    [DISCONTINUED] fluticasone propionate (FLONASE) 50 mcg/actuation nasal spray USE 2 SPRAYS IN EACH       NOSTRIL ONCE DAILY 48 g 1    [DISCONTINUED] tiotropium (SPIRIVA) 18 mcg inhalation capsule 1 capsule.       No facility-administered encounter medications on file as of 11/21/2024.     Orders Placed This Encounter   Procedures    CT Chest Without Contrast     Standing Status:   Future     Standing Expiration Date:   11/21/2025     Order Specific Question:   May the Radiologist modify the order per protocol to meet the clinical needs of the patient?     Answer:   Yes     Order Specific Question:   Access port per protocol?     Answer:   No       Plan:       Problem List Items Addressed This Visit          Pulmonary    Chronic obstructive pulmonary disease     Patient continues to have problems with shortness of breath I am going to add theophylline to his regimen had restriction noted on his functions we have not seen any interstitial lung disease on CT will go ahead and push on with this treatment         Lung mass     Patient presents today with solitary pulmonary nodules was supposed to have a repeat CT he has not had it done will order seen back in a month          Other Visit Diagnoses       Solitary pulmonary nodule    -  Primary    Relevant Orders    CT Chest Without Contrast

## 2024-12-18 DIAGNOSIS — J44.9 CHRONIC OBSTRUCTIVE PULMONARY DISEASE, UNSPECIFIED COPD TYPE: Primary | ICD-10-CM

## 2024-12-18 RX ORDER — BUDESONIDE, GLYCOPYRROLATE, AND FORMOTEROL FUMARATE 160; 9; 4.8 UG/1; UG/1; UG/1
1 AEROSOL, METERED RESPIRATORY (INHALATION) DAILY
Qty: 10.7 G | Refills: 2 | Status: SHIPPED | OUTPATIENT
Start: 2024-12-18

## 2024-12-18 NOTE — TELEPHONE ENCOUNTER
----- Message from Rita sent at 12/18/2024  8:04 AM CST -----  Regarding: Med Refill  Contact: Patient  Pt needs: medication sent in to local pharmacy, his mail order will come while he is out of town and he needs an inhaler to take with him. Patient is going out of town today.  [facility] 160-9-4.8 mcg/actuation HFAA.     Pharmacy: [facility]/pharmacy #[id number] - [place], [place] - [address] [facility]    Phone #: [phone]

## 2024-12-23 RX ORDER — CELECOXIB 200 MG/1
200 CAPSULE ORAL 2 TIMES DAILY
Qty: 60 CAPSULE | Refills: 3 | Status: SHIPPED | OUTPATIENT
Start: 2024-12-23

## 2024-12-30 RX ORDER — CELECOXIB 200 MG/1
200 CAPSULE ORAL 2 TIMES DAILY
Qty: 60 CAPSULE | Refills: 3 | Status: SHIPPED | OUTPATIENT
Start: 2024-12-30

## 2025-01-02 ENCOUNTER — TELEPHONE (OUTPATIENT)
Dept: FAMILY MEDICINE | Facility: CLINIC | Age: 70
End: 2025-01-02
Payer: COMMERCIAL

## 2025-01-02 ENCOUNTER — OFFICE VISIT (OUTPATIENT)
Dept: FAMILY MEDICINE | Facility: CLINIC | Age: 70
End: 2025-01-02
Payer: COMMERCIAL

## 2025-01-02 VITALS
HEART RATE: 89 BPM | DIASTOLIC BLOOD PRESSURE: 88 MMHG | OXYGEN SATURATION: 96 % | WEIGHT: 230 LBS | TEMPERATURE: 98 F | BODY MASS INDEX: 32.2 KG/M2 | SYSTOLIC BLOOD PRESSURE: 148 MMHG | RESPIRATION RATE: 18 BRPM | HEIGHT: 71 IN

## 2025-01-02 DIAGNOSIS — R05.1 ACUTE COUGH: Primary | ICD-10-CM

## 2025-01-02 DIAGNOSIS — J98.11 ATELECTASIS OF RIGHT LUNG: Primary | ICD-10-CM

## 2025-01-02 PROCEDURE — 99213 OFFICE O/P EST LOW 20 MIN: CPT | Mod: ,,, | Performed by: FAMILY MEDICINE

## 2025-01-02 RX ORDER — BENZONATATE 100 MG/1
100 CAPSULE ORAL 3 TIMES DAILY PRN
Qty: 50 CAPSULE | Refills: 2 | Status: SHIPPED | OUTPATIENT
Start: 2025-01-02 | End: 2025-01-12

## 2025-01-02 NOTE — PROGRESS NOTES
Mikhail Stanford is a 69 y.o. male seen today for follow-up on his COPD.  Despite a negative chest x-ray in October he has a persistent worsening cough and has had episodes of lightheadedness which are worrisome since they have occurred while driving.  On his last lab work the patient had no evidence of anemia and his EKG was basically within normal limits.  He may need a Zio monitor and he may need a follow-up with his cardiologist.  He denies any chest pain and has had no shortness a breath above baseline.  Of course he is no longer smoking.     Past Medical History:   Diagnosis Date    COPD (chronic obstructive pulmonary disease)     Hypertension     Sleep apnea      Family History   Problem Relation Name Age of Onset    Cancer Mother      Diabetes Sister      Hyperlipidemia Sister       Current Outpatient Medications on File Prior to Visit   Medication Sig Dispense Refill    atorvastatin (LIPITOR) 40 MG tablet TAKE 1 TABLET DAILY 90 tablet 1    BREZTRI AEROSPHERE 160-9-4.8 mcg/actuation HFAA Inhale 1 puff into the lungs once daily. 10.7 g 2    brimonidine 0.2% (ALPHAGAN) 0.2 % Drop Place 1 drop into the left eye 2 (two) times a day.      celecoxib (CELEBREX) 200 MG capsule TAKE 1 CAPSULE TWICE DAILY 60 capsule 3    chlorhexidine (PERIDEX) 0.12 % solution 5 mLs 3 (three) times daily.      dapagliflozin propanediol (FARXIGA) 10 mg tablet TAKE 1 TABLET ONCE DAILY 90 tablet 3    esomeprazole (NEXIUM) 40 MG capsule Take 1 capsule (40 mg total) by mouth once daily. 90 capsule 1    ezetimibe (ZETIA) 10 mg tablet TAKE 1 TABLET DAILY 90 tablet 1    fluticasone propionate (FLONASE) 50 mcg/actuation nasal spray USE 2 SPRAYS IN EACH       NOSTRIL ONCE DAILY 48 g 1    tadalafiL (CIALIS) 20 MG Tab Take 1 tablet (20 mg total) by mouth daily as needed (intercourse). 24 tablet 3    tamsulosin (FLOMAX) 0.4 mg Cap Take one capsule twice a day 180 capsule 3    theophylline (THEODUR) 300 MG 12 hr tablet Take 1 tablet (300 mg total) by  mouth 2 (two) times daily. 60 tablet 11    valsartan-hydrochlorothiazide (DIOVAN-HCT) 160-12.5 mg per tablet 1 tablet Orally Once a day 90 tablet 1    vibegron 75 mg Tab Take one tablet daily 90 tablet 3    albuterol-budesonide (AIRSUPRA) 90-80 mcg/actuation Use 2 puffs every 6 hours as needed for shortness a breath (Patient not taking: Reported on 1/2/2025) 21.14 g 11     No current facility-administered medications on file prior to visit.     Immunization History   Administered Date(s) Administered    Influenza (FLUAD) - Quadrivalent - Adjuvanted - PF *Preferred* (65+) 12/21/2023    Pneumococcal Conjugate - 20 Valent 01/30/2023       Review of Systems   Constitutional:  Negative for fever, malaise/fatigue and weight loss.   Respiratory:  Positive for cough. Negative for shortness of breath.    Cardiovascular:  Negative for chest pain and palpitations.   Gastrointestinal:  Negative for nausea and vomiting.   Neurological:  Positive for dizziness.   Psychiatric/Behavioral:  Negative for depression.         Vitals:    01/02/25 1001   BP: (!) 148/88   Pulse:    Resp:    Temp:        Physical Exam  Vitals reviewed.   Constitutional:       Appearance: Normal appearance.   HENT:      Head: Normocephalic.   Eyes:      Extraocular Movements: Extraocular movements intact.      Conjunctiva/sclera: Conjunctivae normal.      Pupils: Pupils are equal, round, and reactive to light.   Neck:      Thyroid: No thyroid mass or thyromegaly.   Cardiovascular:      Rate and Rhythm: Normal rate and regular rhythm.      Heart sounds: Normal heart sounds. No murmur heard.     No gallop.   Pulmonary:      Effort: Pulmonary effort is normal. No respiratory distress.      Breath sounds: Decreased air movement present. Decreased breath sounds and rhonchi present. No wheezing or rales.      Comments: Patient has mild scattered rhonchi worse in the bases.  Skin:     General: Skin is warm and dry.      Coloration: Skin is not jaundiced or pale.    Neurological:      Mental Status: He is alert.   Psychiatric:         Mood and Affect: Mood normal.         Behavior: Behavior normal.         Thought Content: Thought content normal.         Judgment: Judgment normal.          Assessment and Plan  1. Acute cough  -     X-Ray Chest PA And Lateral; Future; Expected date: 01/02/2025  -     benzonatate (TESSALON) 100 MG capsule; Take 1 capsule (100 mg total) by mouth 3 (three) times daily as needed for Cough.  Dispense: 50 capsule; Refill: 2                 Chest x-ray appears to be clear.      Return to clinic in 1 month with home blood pressure log.  He will be set up for follow-up pulmonology and Cardiology.    Health Maintenance Topics with due status: Not Due       Topic Last Completion Date    Hemoglobin A1c (Diabetic Prevention Screening) 04/21/2023    Annual UACr 06/18/2024    Colorectal Cancer Screening 07/29/2024    Lipid Panel 10/07/2024

## 2025-01-02 NOTE — TELEPHONE ENCOUNTER
----- Message from Vishnu Ballesteros MD sent at 1/2/2025  1:58 PM CST -----  Patient has elevated diaphragms bilaterally with possible atelectasis involving the right lower lobe.  A low-dose CT scan was recommended by Radiology so please place this order and let the patient know.

## 2025-01-02 NOTE — TELEPHONE ENCOUNTER
Notified pt about cxr results read by .Pt agreed to having a LDCT of his chest. Order placed and Rita is calling him back with an appt.

## 2025-01-08 ENCOUNTER — HOSPITAL ENCOUNTER (OUTPATIENT)
Dept: CARDIOLOGY | Facility: HOSPITAL | Age: 70
Discharge: HOME | End: 2025-01-08
Attending: STUDENT IN AN ORGANIZED HEALTH CARE EDUCATION/TRAINING PROGRAM
Payer: COMMERCIAL

## 2025-01-08 ENCOUNTER — OFFICE VISIT (OUTPATIENT)
Dept: CARDIOLOGY | Facility: CLINIC | Age: 70
End: 2025-01-08
Payer: COMMERCIAL

## 2025-01-08 VITALS
DIASTOLIC BLOOD PRESSURE: 80 MMHG | HEIGHT: 71 IN | WEIGHT: 234 LBS | SYSTOLIC BLOOD PRESSURE: 120 MMHG | RESPIRATION RATE: 16 BRPM | HEART RATE: 72 BPM | BODY MASS INDEX: 32.76 KG/M2

## 2025-01-08 DIAGNOSIS — R00.2 PALPITATIONS: ICD-10-CM

## 2025-01-08 DIAGNOSIS — R00.2 PALPITATIONS: Primary | ICD-10-CM

## 2025-01-08 DIAGNOSIS — R42 DIZZINESS: ICD-10-CM

## 2025-01-08 DIAGNOSIS — J44.9 CHRONIC OBSTRUCTIVE PULMONARY DISEASE, UNSPECIFIED COPD TYPE: ICD-10-CM

## 2025-01-08 DIAGNOSIS — R06.02 SOB (SHORTNESS OF BREATH): ICD-10-CM

## 2025-01-08 PROCEDURE — 99214 OFFICE O/P EST MOD 30 MIN: CPT | Mod: PBBFAC,25 | Performed by: STUDENT IN AN ORGANIZED HEALTH CARE EDUCATION/TRAINING PROGRAM

## 2025-01-08 PROCEDURE — 99213 OFFICE O/P EST LOW 20 MIN: CPT | Mod: S$PBB,,, | Performed by: STUDENT IN AN ORGANIZED HEALTH CARE EDUCATION/TRAINING PROGRAM

## 2025-01-08 PROCEDURE — 99999 PR PBB SHADOW E&M-EST. PATIENT-LVL IV: CPT | Mod: PBBFAC,,, | Performed by: STUDENT IN AN ORGANIZED HEALTH CARE EDUCATION/TRAINING PROGRAM

## 2025-01-08 PROCEDURE — 93246 EXT ECG>7D<15D RECORDING: CPT

## 2025-01-08 NOTE — PROGRESS NOTES
PCP: Vishnu Ballesteros MD    Referring Provider:     Subjective:   Mikhail Stanford is a 69 y.o. male with hx of HTN, HLD who presents for followup    1/8/25 - Doing well. Denies any complaints. Reports having dizziness last few months. Attributes it to bouts of cough and SOB    10/9/24 - patient reports sharp left sided chest pain. Worse on deep breathing. Dr. Ballesteros prescribed antibiotics that he has not received. I reviewed his [facility] images with him and reassured him that his chest pain is unlikely related to CAD.     6/28/23 - Patient continues to have exertional fatigue and SOB despite being on therapy for COPD. His BP is low for initiation of BB or CCB    5/27/22 - Patient states he has some shortness of breath.  He does not exercise often. Blood pressure controlled.  Has follow up with Dr. Castellano     2/14/22 - Patient of Dr. Castellano. Patient reports chronic issues with shortness of breath at rest and with exertion. Associated with runny nose. Episode occur daily. For last 2 years.     Fhx: non-contributary  Shx: Denies smoking, Hx of remote drug user (>30 years). No etoh    EKG 2/14/22 - NSR - normal  ECHO 2/2022 - normal LV and size fxn. Mild LAE, mitral valve thickening without stenosis. Indeterminate diastolic dysfunction  STRESS TEST 2/14/22 - negative for ischemia.  The patient exercised for 6 minutes 43 seconds, functional capacity of 8 METS.  The patient reported shortness of breath during the stress test. Denied chest pain/pressure/tightness.     Cath Results for orders placed during the hospital encounter of 07/10/23    The coronary arteries were normal..    The left ventricular end diastolic pressure was normal.    The pre-procedure left ventricular end diastolic pressure was 10.    The estimated blood loss was none.          Lab Results   Component Value Date     10/07/2024    K 4.1 10/07/2024     (H) 10/07/2024    CO2 25 10/07/2024    BUN 24 (H) 10/07/2024    CREATININE 1.70 (H)  "10/07/2024    CALCIUM 9.1 10/07/2024    ANIONGAP 11 10/07/2024    ESTGFRAFRICA 59 (L) 10/04/2021    EGFRNONAA 43 (L) 06/13/2022       Lab Results   Component Value Date    CHOL 144 10/07/2024    CHOL 149 03/07/2024    CHOL 157 01/31/2023     Lab Results   Component Value Date    HDL 62 (H) 10/07/2024    HDL 63 (H) 03/07/2024    HDL 65 (H) 01/31/2023     Lab Results   Component Value Date    LDLCALC 64 10/07/2024    LDLCALC 73 03/07/2024    LDLCALC 73 01/31/2023     Lab Results   Component Value Date    TRIG 88 10/07/2024    TRIG 65 03/07/2024    TRIG 96 01/31/2023     Lab Results   Component Value Date    CHOLHDL 2.3 10/07/2024    CHOLHDL 2.4 03/07/2024    CHOLHDL 2.4 01/31/2023       Lab Results   Component Value Date    WBC 4.90 10/07/2024    HGB 15.3 10/07/2024    HCT 48.4 10/07/2024    MCV 92.0 10/07/2024     10/07/2024           Review of Systems   Constitutional:  Positive for malaise/fatigue.   Respiratory:  Positive for shortness of breath. Negative for cough.    Cardiovascular:  Negative for chest pain, palpitations, orthopnea, claudication, leg swelling and PND.         Objective:   /80   Pulse 72   Resp 16   Ht 5' 11" (1.803 m)   Wt 106.1 kg (234 lb)   BMI 32.64 kg/m²     Physical Exam  Vitals and nursing note reviewed.   Cardiovascular:      Rate and Rhythm: Normal rate and regular rhythm.      Pulses: Normal pulses.      Heart sounds: Normal heart sounds.   Pulmonary:      Breath sounds: Normal breath sounds.   Musculoskeletal:      Right lower leg: No edema.      Left lower leg: No edema.   Neurological:      Mental Status: He is oriented to person, place, and time.           Assessment:     1. Palpitations  Cardiac Monitor - 3-15 Day Adult (Cupid Only)      2. SOB (shortness of breath)        3. Chronic obstructive pulmonary disease, unspecified COPD type        4. Dizziness                Plan:   SOB (shortness of breath)  Chronic; despites adequate COPD therapy continue to have " exertional SOB.   Stress test - 7 mins; 8 METs - normal   LHC with normal cors   ECHO with normal LVEF      Chronic obstructive pulmonary disease  Follows Dr. Castellano    Dizziness  With SOB and cough  Will get ziopatch

## 2025-01-09 ENCOUNTER — HOSPITAL ENCOUNTER (OUTPATIENT)
Dept: RADIOLOGY | Facility: HOSPITAL | Age: 70
Discharge: HOME | End: 2025-01-09
Attending: FAMILY MEDICINE
Payer: COMMERCIAL

## 2025-01-09 VITALS — WEIGHT: 235 LBS | HEIGHT: 71 IN | BODY MASS INDEX: 32.9 KG/M2

## 2025-01-09 DIAGNOSIS — J98.11 ATELECTASIS OF RIGHT LUNG: ICD-10-CM

## 2025-01-09 PROCEDURE — 71271 CT THORAX LUNG CANCER SCR C-: CPT | Mod: 26,,, | Performed by: RADIOLOGY

## 2025-01-09 PROCEDURE — 71271 CT THORAX LUNG CANCER SCR C-: CPT | Mod: TC

## 2025-01-10 ENCOUNTER — TELEPHONE (OUTPATIENT)
Dept: FAMILY MEDICINE | Facility: CLINIC | Age: 70
End: 2025-01-10
Payer: COMMERCIAL

## 2025-01-10 NOTE — TELEPHONE ENCOUNTER
Notified patient of results. Patient voiced understanding. Pt stated he has another CT scheduled for 01/24/25, CT of chest without contrast ordered via Dr Castellano, his pulmonologist.

## 2025-01-10 NOTE — TELEPHONE ENCOUNTER
----- Message from Vishnu Ballesteros MD sent at 1/10/2025  9:59 AM CST -----  The patient has several subcentimeter nodules which have not changed.  A follow-up CT was recommended in 1 year but if he is concerned we can set him up with pulmonology.

## 2025-01-14 ENCOUNTER — TELEPHONE (OUTPATIENT)
Dept: FAMILY MEDICINE | Facility: CLINIC | Age: 70
End: 2025-01-14
Payer: MEDICARE

## 2025-01-14 NOTE — TELEPHONE ENCOUNTER
----- Message from Med Assistant Trejo sent at 1/13/2025 12:35 PM CST -----  Pt is needing a call back at  concerning his medication.

## 2025-01-14 NOTE — TELEPHONE ENCOUNTER
"Returned pt's call. Pt stated he received a trellegy inhaler in the mail and was inquiring if he needed to use it and/or stop one if his other inhalers, AirSupra and Breztri. I informed the pt that the last I saw treannemarieegy in his chart was in 2023 sent in by Dr Castellano, but that Dr Castellano recently sent in theophylline for him to use bid along with his inhalers. I gave the recommendation of not using the trellegy he just received as it is not active in his chart until I am able to consult with Dr Ballesteros later this afternoon or tomorrow as he had to step out of the clinic this morning due to an unfortunate event, but that I would give pt a call back once I speak with Dr Ballesteros regarding the matter. Pt verbalized understanding. I inquired if his breathing "appeared to be any better" pt stated he has not noticed any change, that he still gets sob with a couple steps. I informed him that he has an appointment with Dr Castellano on 01/24/25 either before/after his CT and that he can also bring up the trellegy inhaler with him then along with his no change in status for breathing during that visit. Pt verbalized understanding.   "

## 2025-01-15 ENCOUNTER — DOCUMENTATION ONLY (OUTPATIENT)
Dept: RADIOLOGY | Facility: HOSPITAL | Age: 70
End: 2025-01-15
Payer: COMMERCIAL

## 2025-01-16 ENCOUNTER — TELEPHONE (OUTPATIENT)
Dept: FAMILY MEDICINE | Facility: CLINIC | Age: 70
End: 2025-01-16
Payer: MEDICARE

## 2025-01-16 NOTE — TELEPHONE ENCOUNTER
Patient notified that Dr. Ballesteros said that the Trellegy would replace the Breztri. Patient voiced understanding  and voiced the sig was one puff a day and to rinse mouth after use. Provider agreed with sig. Patient stated he would stop the Breztri after tonight's dose and then start the Trellegy tomorrow.

## 2025-01-23 RX ORDER — FLUTICASONE FUROATE, UMECLIDINIUM BROMIDE AND VILANTEROL TRIFENATATE 100; 62.5; 25 UG/1; UG/1; UG/1
POWDER RESPIRATORY (INHALATION)
COMMUNITY
Start: 2025-01-06

## 2025-01-24 ENCOUNTER — OFFICE VISIT (OUTPATIENT)
Dept: PULMONOLOGY | Facility: CLINIC | Age: 70
End: 2025-01-24
Payer: MEDICARE

## 2025-01-24 ENCOUNTER — HOSPITAL ENCOUNTER (OUTPATIENT)
Dept: RADIOLOGY | Facility: HOSPITAL | Age: 70
Discharge: HOME | End: 2025-01-24
Attending: INTERNAL MEDICINE
Payer: COMMERCIAL

## 2025-01-24 VITALS
WEIGHT: 233.69 LBS | HEART RATE: 84 BPM | OXYGEN SATURATION: 95 % | DIASTOLIC BLOOD PRESSURE: 84 MMHG | BODY MASS INDEX: 32.72 KG/M2 | SYSTOLIC BLOOD PRESSURE: 122 MMHG | HEIGHT: 71 IN | RESPIRATION RATE: 16 BRPM

## 2025-01-24 DIAGNOSIS — R06.02 SOB (SHORTNESS OF BREATH): ICD-10-CM

## 2025-01-24 DIAGNOSIS — R22.2 LOCALIZED SWELLING, MASS AND LUMP, TRUNK: ICD-10-CM

## 2025-01-24 DIAGNOSIS — R91.1 SOLITARY PULMONARY NODULE: ICD-10-CM

## 2025-01-24 DIAGNOSIS — J44.9 CHRONIC OBSTRUCTIVE PULMONARY DISEASE, UNSPECIFIED COPD TYPE: ICD-10-CM

## 2025-01-24 DIAGNOSIS — R22.2 LOCALIZED SWELLING, MASS AND LUMP, TRUNK: Primary | ICD-10-CM

## 2025-01-24 PROCEDURE — 99215 OFFICE O/P EST HI 40 MIN: CPT | Mod: PBBFAC,25 | Performed by: INTERNAL MEDICINE

## 2025-01-24 PROCEDURE — 99214 OFFICE O/P EST MOD 30 MIN: CPT | Mod: S$PBB,,, | Performed by: INTERNAL MEDICINE

## 2025-01-24 PROCEDURE — 71250 CT THORAX DX C-: CPT | Mod: TC

## 2025-01-24 PROCEDURE — 99999 PR PBB SHADOW E&M-EST. PATIENT-LVL V: CPT | Mod: PBBFAC,,, | Performed by: INTERNAL MEDICINE

## 2025-01-24 NOTE — ASSESSMENT & PLAN NOTE
Patient has 3 solitary pulmonary nodules unchanged for 18 months will repeat CT in 6 months if no change no further workup needed

## 2025-01-24 NOTE — PROGRESS NOTES
Subjective:       Patient ID: Mikhail Stanford is a 69 y.o. male.    Chief Complaint: Follow-up (Follow up/CT.)    Follow-up  This is a chronic problem. The current episode started more than 1 month ago. The problem has been unchanged. Pertinent negatives include no abdominal pain, arthralgias, chest pain, chills, congestion, headaches or rash.     Past Medical History:   Diagnosis Date    COPD (chronic obstructive pulmonary disease)     Hypertension     Sleep apnea      Past Surgical History:   Procedure Laterality Date    CATARACT EXTRACTION Bilateral 2024    DENTAL SURGERY      EXCISION OF LESION OF LIP N/A 2023    Procedure: EXCISION, LESION, LIP;  Surgeon: Kuldeep Yao MD;  Location: [facility] OR;  Service: ENT;  Laterality: N/A;    HERNIA REPAIR  2020    LEFT HEART CATHETERIZATION N/A 07/10/2023    Procedure: Left heart cath;  Surgeon: Jeremias Simmons MD;  Location: [facility] CATH LAB;  Service: Cardiology;  Laterality: N/A;    REINSERTION, TENDON, BICEPS OR TRICEPS, RUPTURED, AT ELBOW Right 2024    Procedure: REINSERTION, TENDON, BICEPS OR TRICEPS, RUPTURED, AT ELBOW;  Surgeon: Jourdan Winslow MD;  Location: [facility] OR;  Service: Orthopedics;  Laterality: Right;    TENOTOMY, ELBOW, WITH DEBRIDEMENT Right 2024    Procedure: TENOTOMY, ELBOW, WITH DEBRIDEMENT;  Surgeon: Jourdan Winslow MD;  Location: [facility] OR;  Service: Orthopedics;  Laterality: Right;     Family History   Problem Relation Name Age of Onset    Cancer Mother      Diabetes Sister      Hyperlipidemia Sister       Review of patient's allergies indicates:  No Known Allergies   Social History     Tobacco Use    Smoking status: Former     Current packs/day: 0.00     Average packs/day: 2.0 packs/day for 40.0 years (80.0 ttl pk-yrs)     Types: Cigarettes     Start date:      Quit date: 2006     Years since quittin.0     Passive exposure: Past    Smokeless tobacco: Former     Types: Chew     Quit  "date: 1/27/2006    Tobacco comments:     Former smoker about 2 packs/day about 15 years ago. 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   HENT:  Negative for congestion and ear pain.    Eyes:  Negative for redness and itching.   Cardiovascular:  Negative for chest pain and palpitations.   Musculoskeletal:  Negative for arthralgias and back pain.   Skin:  Negative for rash.   Gastrointestinal:  Negative for abdominal pain and abdominal distention.   Neurological:  Negative for dizziness and headaches.   Hematological:  Negative for adenopathy. Does not bruise/bleed easily.   Psychiatric/Behavioral:  Negative for confusion. The patient is not nervous/anxious.        Objective:      Physical Exam   Constitutional: He is oriented to person, place, and time. He appears well-developed and well-nourished.   HENT:   Head: Normocephalic.   Nose: Nose normal.   Mouth/Throat: Oropharynx is clear and moist.   Neck: No JVD present. No thyromegaly present.   Cardiovascular: Normal rate, regular rhythm, normal heart sounds and intact distal pulses.   Pulmonary/Chest: Normal expansion, hyperinflation, symmetric chest wall expansion, effort normal and breath sounds normal.   Abdominal: Soft. Bowel sounds are normal.   Musculoskeletal:         General: Normal range of motion.      Cervical back: Normal range of motion and neck supple.   Lymphadenopathy: No supraclavicular adenopathy is present.     He has no cervical adenopathy.   Neurological: He is alert and oriented to person, place, and time. He has normal reflexes.   Skin: Skin is warm and dry.   Psychiatric: He has a normal mood and affect. His behavior is normal.     Personal Diagnostic Review  none pertinent        1/24/2025    " "10:38 AM 2025     1:37 PM 2025    11:12 AM 2025     9:36 AM 2024    12:48 PM 10/29/2024    10:32 AM 10/21/2024     9:36 AM   Pulmonary Function Tests   SpO2 95 %   96 % 98 % 97 % 97 %   Height 5' 11" (1.803 m) 5' 11" (1.803 m) 5' 11" (1.803 m) 5' 11" (1.803 m) 5' 11" (1.803 m) 5' 11" (1.803 m) 5' 11" (1.803 m)   Weight 106 kg (233 lb 11 oz) 106.6 kg (235 lb) 106.1 kg (234 lb) 104.3 kg (230 lb) 104.8 kg (231 lb) 106.1 kg (234 lb) 108 kg (238 lb)   BMI (Calculated) 32.6 32.8 32.7 32.1 32.2 32.7 33.2         Assessment:       1. Localized swelling, mass and lump, trunk    2. Chronic obstructive pulmonary disease, unspecified COPD type    3. SOB (shortness of breath)    4. Solitary pulmonary nodule        Outpatient Encounter Medications as of 2025   Medication Sig Dispense Refill    atorvastatin (LIPITOR) 40 MG tablet TAKE 1 TABLET DAILY 90 tablet 1    BREZTRI AEROSPHERE 160-9-4.8 mcg/actuation HFAA Inhale 1 puff into the lungs once daily. 10.7 g 2    brimonidine 0.2% (ALPHAGAN) 0.2 % Drop Place 1 drop into the left eye 2 (two) times a day.      celecoxib (CELEBREX) 200 MG capsule TAKE 1 CAPSULE TWICE DAILY 60 capsule 3    dapagliflozin propanediol (FARXIGA) 10 mg tablet TAKE 1 TABLET ONCE DAILY 90 tablet 3    esomeprazole (NEXIUM) 40 MG capsule Take 1 capsule (40 mg total) by mouth once daily. 90 capsule 1    ezetimibe (ZETIA) 10 mg tablet TAKE 1 TABLET DAILY 90 tablet 1    fluticasone propionate (FLONASE) 50 mcg/actuation nasal spray USE 2 SPRAYS IN EACH       NOSTRIL ONCE DAILY 48 g 1    tadalafiL (CIALIS) 20 MG Tab Take 1 tablet (20 mg total) by mouth daily as needed (intercourse). 24 tablet 3    tamsulosin (FLOMAX) 0.4 mg Cap Take one capsule twice a day 180 capsule 3    theophylline (THEODUR) 300 MG 12 hr tablet Take 1 tablet (300 mg total) by mouth 2 (two) times daily. 60 tablet 11    TRELEGY ELLIPTA 100-62.5-25 mcg DsDv SMARTSI inhalation By Mouth Daily      " valsartan-hydrochlorothiazide (DIOVAN-HCT) 160-12.5 mg per tablet 1 tablet Orally Once a day 90 tablet 1    [DISCONTINUED] albuterol-budesonide (AIRSUPRA) 90-80 mcg/actuation Use 2 puffs every 6 hours as needed for shortness a breath 21.14 g 11    albuterol-budesonide (AIRSUPRA) 90-80 mcg/actuation Use 2 puffs every 6 hours as needed for shortness a breath 21.14 g 11    [] benzonatate (TESSALON) 100 MG capsule Take 1 capsule (100 mg total) by mouth 3 (three) times daily as needed for Cough. 50 capsule 2    chlorhexidine (PERIDEX) 0.12 % solution 5 mLs 3 (three) times daily. (Patient not taking: Reported on 2025)      vibegron 75 mg Tab Take one tablet daily (Patient not taking: Reported on 2025) 90 tablet 3    [DISCONTINUED] celecoxib (CELEBREX) 200 MG capsule TAKE 1 CAPSULE TWICE DAILY 60 capsule 3     No facility-administered encounter medications on file as of 2025.     Orders Placed This Encounter   Procedures    CT Chest Without Contrast     Standing Status:   Future     Standing Expiration Date:   2026     Order Specific Question:   May the Radiologist modify the order per protocol to meet the clinical needs of the patient?     Answer:   Yes     Order Specific Question:   Access port per protocol?     Answer:   No       Plan:       Problem List Items Addressed This Visit          Pulmonary    SOB (shortness of breath)     Patient seems to be doing reasonably well using [place] supra and Trelegy and theophylline is able to carry out his activities of daily living no real changes here         Chronic obstructive pulmonary disease    Relevant Medications    albuterol-budesonide (AIRSUPRA) 90-80 mcg/actuation    Solitary pulmonary nodule     Patient has 3 solitary pulmonary nodules unchanged for 18 months will repeat CT in 6 months if no change no further workup needed          Other Visit Diagnoses       Localized swelling, mass and lump, trunk    -  Primary    Relevant Orders    CT Chest  Without Contrast

## 2025-01-25 DIAGNOSIS — J44.9 CHRONIC OBSTRUCTIVE PULMONARY DISEASE, UNSPECIFIED COPD TYPE: ICD-10-CM

## 2025-01-27 ENCOUNTER — TELEPHONE (OUTPATIENT)
Dept: PULMONOLOGY | Facility: CLINIC | Age: 70
End: 2025-01-27
Payer: MEDICARE

## 2025-01-27 NOTE — TELEPHONE ENCOUNTER
----- Message from Sana sent at 1/27/2025  8:27 AM CST -----  [facility] needing clarification on meds. Recvd RX for albuterol-budesonide (AIRSUPRA) 90-80 mcg/actuation but pt got Breztri on 12/18. Needs to know if pt uses both meds or just one. Please call [phone] option 2 ref#[id number]. Needs to know before med can be dispensed to pt.

## 2025-01-28 RX ORDER — ALBUTEROL SULFATE AND BUDESONIDE 90; 80 UG/1; UG/1
AEROSOL, METERED RESPIRATORY (INHALATION)
Qty: 10.7 G | Refills: 5 | Status: SHIPPED | OUTPATIENT
Start: 2025-01-28

## 2025-01-29 NOTE — TELEPHONE ENCOUNTER
Spoke with the pharmacist at [facility]  verbalized patient will be using both inhalers. Pharmacist understood. No questions or concerns at this time.

## 2025-02-03 ENCOUNTER — DOCUMENTATION ONLY (OUTPATIENT)
Dept: RADIOLOGY | Facility: HOSPITAL | Age: 70
End: 2025-02-03
Payer: COMMERCIAL

## 2025-02-06 ENCOUNTER — PATIENT OUTREACH (OUTPATIENT)
Facility: HOSPITAL | Age: 70
End: 2025-02-06
Payer: COMMERCIAL

## 2025-02-06 ENCOUNTER — OFFICE VISIT (OUTPATIENT)
Dept: FAMILY MEDICINE | Facility: CLINIC | Age: 70
End: 2025-02-06
Payer: COMMERCIAL

## 2025-02-06 VITALS
OXYGEN SATURATION: 94 % | HEART RATE: 60 BPM | WEIGHT: 234.38 LBS | RESPIRATION RATE: 19 BRPM | BODY MASS INDEX: 32.81 KG/M2 | HEIGHT: 71 IN | DIASTOLIC BLOOD PRESSURE: 72 MMHG | SYSTOLIC BLOOD PRESSURE: 118 MMHG | TEMPERATURE: 99 F

## 2025-02-06 DIAGNOSIS — E78.5 HYPERLIPIDEMIA, UNSPECIFIED HYPERLIPIDEMIA TYPE: ICD-10-CM

## 2025-02-06 DIAGNOSIS — N18.31 STAGE 3A CHRONIC KIDNEY DISEASE: ICD-10-CM

## 2025-02-06 DIAGNOSIS — H69.93 EUSTACHIAN TUBE DYSFUNCTION, BILATERAL: Primary | ICD-10-CM

## 2025-02-06 PROCEDURE — 99213 OFFICE O/P EST LOW 20 MIN: CPT | Mod: 25,,, | Performed by: FAMILY MEDICINE

## 2025-02-06 PROCEDURE — 96372 THER/PROPH/DIAG INJ SC/IM: CPT | Mod: ,,, | Performed by: FAMILY MEDICINE

## 2025-02-06 RX ORDER — EZETIMIBE 10 MG/1
TABLET ORAL
Qty: 90 TABLET | Refills: 1 | Status: SHIPPED | OUTPATIENT
Start: 2025-02-06

## 2025-02-06 RX ORDER — BETAMETHASONE SODIUM PHOSPHATE AND BETAMETHASONE ACETATE 3; 3 MG/ML; MG/ML
6 INJECTION, SUSPENSION INTRA-ARTICULAR; INTRALESIONAL; INTRAMUSCULAR; SOFT TISSUE
Status: COMPLETED | OUTPATIENT
Start: 2025-02-06 | End: 2025-02-06

## 2025-02-06 RX ADMIN — BETAMETHASONE SODIUM PHOSPHATE AND BETAMETHASONE ACETATE 6 MG: 3; 3 INJECTION, SUSPENSION INTRA-ARTICULAR; INTRALESIONAL; INTRAMUSCULAR; SOFT TISSUE at 11:02

## 2025-02-06 NOTE — PROGRESS NOTES
Mikhail Stanford is a 69 y.o. male seen today for follow-up on his dizziness.  Patient's blood pressures have been improved and his cardiac workup is ongoing.  The patient reports he has had some episodes of vertigo especially with rapid head movements and has been congested for several months.  Currently he is on Flonase and his wife recommended starting Allegra which has helped some.  He suffers from chronic sinusitis and halitosis and we discussed an ear nose and throat evaluation.  I also recommended adding Mucinex plain 600 mg b.i.d. to his Flonase and Allegra.    Past Medical History:   Diagnosis Date    COPD (chronic obstructive pulmonary disease)     Hypertension     Sleep apnea      Family History   Problem Relation Name Age of Onset    Cancer Mother      Diabetes Sister      Hyperlipidemia Sister       Current Outpatient Medications on File Prior to Visit   Medication Sig Dispense Refill    albuterol-budesonide (AIRSUPRA) 90-80 mcg/actuation USE 2 INHALATIONS ORALLY   EVERY 6 HOURS AS NEEDED FORSHORTNESS OF BREATH. 10.7 g 5    atorvastatin (LIPITOR) 40 MG tablet TAKE 1 TABLET DAILY 90 tablet 1    BREZTRI AEROSPHERE 160-9-4.8 mcg/actuation HFAA Inhale 1 puff into the lungs once daily. 10.7 g 2    brimonidine 0.2% (ALPHAGAN) 0.2 % Drop Place 1 drop into the left eye 2 (two) times a day.      celecoxib (CELEBREX) 200 MG capsule TAKE 1 CAPSULE TWICE DAILY 60 capsule 3    chlorhexidine (PERIDEX) 0.12 % solution 5 mLs 3 (three) times daily.      dapagliflozin propanediol (FARXIGA) 10 mg tablet TAKE 1 TABLET ONCE DAILY 90 tablet 3    esomeprazole (NEXIUM) 40 MG capsule Take 1 capsule (40 mg total) by mouth once daily. 90 capsule 1    fluticasone propionate (FLONASE) 50 mcg/actuation nasal spray USE 2 SPRAYS IN EACH       NOSTRIL ONCE DAILY 48 g 1    tadalafiL (CIALIS) 20 MG Tab Take 1 tablet (20 mg total) by mouth daily as needed (intercourse). 24 tablet 3    tamsulosin (FLOMAX) 0.4 mg Cap Take one capsule twice a  day 180 capsule 3    theophylline (THEODUR) 300 MG 12 hr tablet Take 1 tablet (300 mg total) by mouth 2 (two) times daily. 60 tablet 11    TRELEGY ELLIPTA 100-62.5-25 mcg DsDv SMARTSI inhalation By Mouth Daily      valsartan-hydrochlorothiazide (DIOVAN-HCT) 160-12.5 mg per tablet 1 tablet Orally Once a day 90 tablet 1    [DISCONTINUED] ezetimibe (ZETIA) 10 mg tablet TAKE 1 TABLET DAILY 90 tablet 1    vibegron 75 mg Tab Take one tablet daily (Patient not taking: Reported on 2025) 90 tablet 3     No current facility-administered medications on file prior to visit.     Immunization History   Administered Date(s) Administered    COVID-19, mRNA, LNP-S, PF, adelfo-sucrose, 30 mcg/0.3 mL (Pfizer Ages 12+) 2025    Influenza (FLUAD) - Quadrivalent - Adjuvanted - PF *Preferred* (65+) 2023    Influenza (FLUBLOK) - Quadrivalent - Recombinant - PF *Preferred* (egg allergy) 2025    Pneumococcal Conjugate - 20 Valent 2023       Review of Systems   Constitutional:  Negative for fever, malaise/fatigue and weight loss.   HENT:  Positive for congestion and ear pain.    Respiratory:  Negative for shortness of breath.    Cardiovascular:  Negative for chest pain and palpitations.   Gastrointestinal:  Negative for nausea and vomiting.   Neurological:  Positive for dizziness.   Psychiatric/Behavioral:  Negative for depression.         Vitals:    25 1019   BP: 118/72   Pulse: 60   Resp: 19   Temp: 98.5 °F (36.9 °C)       Physical Exam  Vitals reviewed.   HENT:      Right Ear: Tympanic membrane is injected and retracted.      Left Ear: Tympanic membrane is injected and retracted.      Nose:      Right Turbinates: Swollen.      Left Turbinates: Swollen.      Mouth/Throat:      Pharynx: Oropharynx is clear. Postnasal drip present. No pharyngeal swelling, oropharyngeal exudate, posterior oropharyngeal erythema or uvula swelling.   Pulmonary:      Effort: Pulmonary effort is normal.   Neurological:      Mental  Status: He is alert.   Psychiatric:         Mood and Affect: Mood normal.         Behavior: Behavior normal.         Thought Content: Thought content normal.         Judgment: Judgment normal.          Assessment and Plan  1. Eustachian tube dysfunction, bilateral  -     betamethasone acetate-betamethasone sodium phosphate injection 6 mg  -     Ambulatory referral/consult to ENT; Future; Expected date: 02/13/2025    2. Hyperlipidemia, unspecified hyperlipidemia type  -     ezetimibe (ZETIA) 10 mg tablet; TAKE 1 TABLET DAILY  Dispense: 90 tablet; Refill: 1    3. Stage 3a chronic kidney disease  Assessment & Plan:  We will continue current monitor renal function and avoid NSAIDs as possible               Return to clinic in 3 months or as needed.     Health Maintenance Topics with due status: Not Due       Topic Last Completion Date    Hemoglobin A1c (Diabetic Prevention Screening) 04/21/2023    Annual UACr 06/18/2024    Colorectal Cancer Screening 07/29/2024    Lipid Panel 10/07/2024

## 2025-02-06 NOTE — PROGRESS NOTES
Population Health Chart Review & Patient Outreach Details    Updates Requested / Reviewed:  [x]  Care Team Updated      Health Maintenance Topics Addressed and Outreach Outcomes / Actions Taken:  Immunizations [x] Immunizations updated, extracted from miix.

## 2025-02-07 ENCOUNTER — OFFICE VISIT (OUTPATIENT)
Dept: OTOLARYNGOLOGY | Facility: CLINIC | Age: 70
End: 2025-02-07
Payer: COMMERCIAL

## 2025-02-07 VITALS — WEIGHT: 234 LBS | BODY MASS INDEX: 32.76 KG/M2 | HEIGHT: 71 IN

## 2025-02-07 DIAGNOSIS — H69.93 ETD (EUSTACHIAN TUBE DYSFUNCTION), BILATERAL: ICD-10-CM

## 2025-02-07 DIAGNOSIS — J32.9 CHRONIC SINUSITIS, UNSPECIFIED LOCATION: Primary | ICD-10-CM

## 2025-02-07 DIAGNOSIS — H69.93 EUSTACHIAN TUBE DYSFUNCTION, BILATERAL: ICD-10-CM

## 2025-02-07 PROCEDURE — 99214 OFFICE O/P EST MOD 30 MIN: CPT | Mod: PBBFAC | Performed by: OTOLARYNGOLOGY

## 2025-02-07 PROCEDURE — 99999 PR PBB SHADOW E&M-EST. PATIENT-LVL IV: CPT | Mod: PBBFAC,,, | Performed by: OTOLARYNGOLOGY

## 2025-02-07 PROCEDURE — 99214 OFFICE O/P EST MOD 30 MIN: CPT | Mod: S$PBB,,, | Performed by: OTOLARYNGOLOGY

## 2025-02-07 NOTE — PROGRESS NOTES
Subjective:       Patient ID: Mikhail Stanford is a 69 y.o. male.    Chief Complaint: Ear Fullness (Bilateral ears. ), Sinusitis (Sinus drainage. Pt states he has a foul odor coming from his mouth when talking to people or breathing out. ), and Headache (Sinus pressure. )    Ear Fullness   Associated symptoms include headaches and rhinorrhea.   Sinusitis  Associated symptoms include congestion, headaches and sinus pressure.   Headache   Associated symptoms include rhinorrhea and sinus pressure.     Review of Systems   HENT:  Positive for congestion, postnasal drip, rhinorrhea, sinus pressure and sinus pain.    Neurological:  Positive for headaches.   All other systems reviewed and are negative.      Objective:      Physical Exam  General: NAD  Head: Normocephalic, atraumatic, no facial asymmetry/normal strength,  Ears: Both auricules normal in appearance, w/o deformities tympanic membranes normal external auditory canals normal  Nose: External nose w/o deformities very congested red  turbinates no drainage or inflammation  Oral Cavity: Lips, gums, floor of mouth, tongue hard palate, and buccal mucosa without mass/lesion  Oropharynx: Mucosa pink and moist, soft palate, posterior pharynx and oropharyngeal wall without mass/lesion  Neck: Supple, symmetric, trachea midline, no palpable mass/lesion, no palpable cervical lymphadenopathy  Skin: Warm and dry, no concerning lesions  Respiratory: Respirations even, unlabored  Assessment:       1. Chronic sinusitis, unspecified location    2. Eustachian tube dysfunction, bilateral    3. ETD (Eustachian tube dysfunction), bilateral        Plan:     Has sinus odor not helped with current meds   CT sinus   F/u after scan

## 2025-02-20 ENCOUNTER — OFFICE VISIT (OUTPATIENT)
Dept: NEPHROLOGY | Facility: CLINIC | Age: 70
End: 2025-02-20
Payer: COMMERCIAL

## 2025-02-20 ENCOUNTER — HOSPITAL ENCOUNTER (OUTPATIENT)
Dept: RADIOLOGY | Facility: HOSPITAL | Age: 70
Discharge: HOME | End: 2025-02-20
Attending: OTOLARYNGOLOGY
Payer: COMMERCIAL

## 2025-02-20 ENCOUNTER — OFFICE VISIT (OUTPATIENT)
Dept: OTOLARYNGOLOGY | Facility: CLINIC | Age: 70
End: 2025-02-20
Payer: COMMERCIAL

## 2025-02-20 VITALS
BODY MASS INDEX: 33.74 KG/M2 | OXYGEN SATURATION: 95 % | RESPIRATION RATE: 18 BRPM | WEIGHT: 241 LBS | HEIGHT: 71 IN | DIASTOLIC BLOOD PRESSURE: 78 MMHG | HEART RATE: 105 BPM | SYSTOLIC BLOOD PRESSURE: 122 MMHG

## 2025-02-20 DIAGNOSIS — J32.9 CHRONIC SINUSITIS, UNSPECIFIED LOCATION: Primary | ICD-10-CM

## 2025-02-20 DIAGNOSIS — K21.9 GASTROESOPHAGEAL REFLUX DISEASE WITHOUT ESOPHAGITIS: ICD-10-CM

## 2025-02-20 DIAGNOSIS — I12.9 HYPERTENSIVE NEPHROSCLEROSIS, STAGE 1 THROUGH STAGE 4 OR UNSPECIFIED CHRONIC KIDNEY DISEASE: ICD-10-CM

## 2025-02-20 DIAGNOSIS — N18.31 STAGE 3A CHRONIC KIDNEY DISEASE: Primary | ICD-10-CM

## 2025-02-20 DIAGNOSIS — J32.9 CHRONIC SINUSITIS, UNSPECIFIED LOCATION: ICD-10-CM

## 2025-02-20 PROCEDURE — 70486 CT MAXILLOFACIAL W/O DYE: CPT | Mod: TC

## 2025-02-20 PROCEDURE — 99215 OFFICE O/P EST HI 40 MIN: CPT | Mod: PBBFAC | Performed by: NURSE PRACTITIONER

## 2025-02-20 PROCEDURE — 99212 OFFICE O/P EST SF 10 MIN: CPT | Mod: PBBFAC,25 | Performed by: OTOLARYNGOLOGY

## 2025-02-20 RX ORDER — PANTOPRAZOLE SODIUM 40 MG/1
40 TABLET, DELAYED RELEASE ORAL DAILY
Qty: 90 TABLET | Refills: 3 | Status: SHIPPED | OUTPATIENT
Start: 2025-02-20 | End: 2026-02-20

## 2025-02-20 NOTE — PROGRESS NOTES
[facility] History and Physical  Patient Name: Mikhail Stanford  MRN: [id number]  Age: 70 y.o.  : 1955    Date: 2025 3:01 PM    Chief Complaint: Follow Up    HPI :   Mr Stanford presents to Nephrology clinic for follow up. He is followed by Dr. Vishnu Ballesteros MD as his primary care provider.     HTN: diagnosed over 20 years. Current regimen includes valsartan-hydrochlorothiazide 160-12.5 mg daily     DLD: On statin, zetia     Hepatitis: treated in the past unsure which kind.     Nephrology history: No FH of kidney disease, no nephrolithiasis, or recurrent UTIs. No OTC medications including NSAIDs. He is no longer taking aleve.     Patient denies any CP, SOB, peripheral edema, dysuria, hematuria, changes in urinary habits, or increased frequency of urination.     Past Medical History:  has a past medical history of COPD (chronic obstructive pulmonary disease), Hypertension, and Sleep apnea.     Past Surgical History:   has a past surgical history that includes Dental surgery; Excision of lesion of lip (N/A, 2023); Hernia repair (2020); Left heart catheterization (N/A, 07/10/2023); Cataract extraction (Bilateral, 2024); reinsertion, tendon, biceps or triceps, ruptured, at elbow (Right, 2024); and tenotomy, elbow, with debridement (Right, 2024).     Family History:  family history includes Cancer in his mother; Diabetes in his sister; Hyperlipidemia in his sister. No family history of kidney disease.     Social History:   reports that he quit smoking about 19 years ago. His smoking use included cigarettes. He started smoking about 59 years ago. He has a 80 pack-year smoking history. He has been exposed to tobacco smoke. He quit smokeless tobacco use about 19 years ago.  His smokeless tobacco use included chew. He reports that he does not currently use alcohol. He reports that he does not currently use drugs after having used the  "following drugs: Marijuana, "Crack" cocaine, and Heroin. Has two twin sons that are 15. He quit drugs 30 years ago. He raised his sons solo he says. He is now retired.    Allergies: has no known allergies.     Medications: Reviewed including OTC medications, herbal supplements, and NSAIDS.     Old records have been reviewed.      Review of Systems:  ROS: A 10 point ROS was completed and found to be negative except for that mentioned above.          Physical Exam:  Vitals:    02/20/25 0949   BP: 122/78   Pulse: 105   Resp: 18       Constitutional: sitting in chair, in NAD  Eyes: EOMI, white sclera  ENMT: moist mucus membranes, nares patent  Cardiovascular: normal rate, S1/S2 noted, no edema  Respiratory: symmetrical chest expansion, CTA-B  Gastrointestinal: +BS, soft, NT/ND  Musculoskeletal: normal, no joint erythema/effusions  Skin: no rash, no purpura, warm extremities  Neurological: Alert and Oriented x 4, afocal    Labs:   Lab Results   Component Value Date     10/07/2024    K 4.1 10/07/2024    CREATININE 1.70 (H) 10/07/2024    ALT 38 10/07/2024    AST 22 10/07/2024    HGBA1C 6.2 04/21/2023    LDLCALC 64 10/07/2024    CHOL 144 10/07/2024    HDL 62 (H) 10/07/2024    TRIG 88 10/07/2024    TSH 0.598 03/07/2024    WBC 4.90 10/07/2024    HGB 15.3 10/07/2024    HCT 48.4 10/07/2024     10/07/2024    PSA 0.554 04/18/2024        Otherwise Reviewed    Assessment/Plan:       CKD stage IIIa in setting of suspected hypertensive nephrosclerosis.  Baseline sCr 1.5, today sCr pending. Counseled to avoid nephrotoxic agents such as NSAIDs.     Baseline renal ultrasound showed simple cyst; He had IgG Kappa Monoclonal Gammopathy, renal biopsy was obtained to rule out monoclonal gammopathy of renal significance and resulted as arterionephrosclerosis r/t HTN.     Proteinuria: urine Prot:Creat ratio is pending. Patient is on RAAS blockade with ARB. He had an abnormal UPEP in the past. Due to CKD, elevated proteinuria, I " think he would benefit from Farxiga to help slow proteinuria. Taking Farxiga with no issues.     Anemia: CBC today     BMD: Calcium and Phosphorus PTH Vit D pending    HTN: well controlled with current meds     RTC 8 months with CBC, RFP, UA, urine for Prot:creat ratio, PTH, Vit D      Signature:             ANJU Herrera  [facility]  [address] [facility] [address]  [phone]        20 minutes of total time spent on the encounter, which includes face to face time and non-face to face time preparing to see the patient (eg, review of tests), Obtaining and/or reviewing separately obtained history, Documenting clinical information in the electronic or other health record, Independently interpreting results (not separately reported) and communicating results to the patient/family/caregiver, or Care coordination (not separately reported).       Date of encounter: 2/20/25

## 2025-02-20 NOTE — PROGRESS NOTES
Subjective:       Patient ID: Mikhail Stanford is a 70 y.o. male.    Chief Complaint: No chief complaint on file.  F/u sinus congestion bad taste odor in mouth  HPI  Review of Systems   HENT:  Positive for congestion.    All other systems reviewed and are negative.      Objective:      Physical Exam  General: NAD  Head: Normocephalic, atraumatic, no facial asymmetry/normal strength,  Ears: Both auricules normal in appearance, w/o deformities tympanic membranes normal external auditory canals normal  Nose: External nose w/o deformities normal turbinates no drainage or inflammation  Oral Cavity: Lips, gums, floor of mouth, tongue hard palate, and buccal mucosa without mass/lesion  Oropharynx: Mucosa pink and moist, soft palate, posterior pharynx and oropharyngeal wall without mass/lesion  Neck: Supple, symmetric, trachea midline, no palpable mass/lesion, no palpable cervical lymphadenopathy  Skin: Warm and dry, no concerning lesions  Respiratory: Respirations even, unlabored  Assessment:       1. Chronic sinusitis, unspecified location    2. Gastroesophageal reflux disease without esophagitis        Plan:     CT sinus reviewed and discussed normal   Taking nexium will switch to protonix   F/u  1month

## 2025-02-24 DIAGNOSIS — K21.9 GASTROESOPHAGEAL REFLUX DISEASE, UNSPECIFIED WHETHER ESOPHAGITIS PRESENT: ICD-10-CM

## 2025-02-24 RX ORDER — TADALAFIL 20 MG/1
TABLET ORAL
Qty: 18 TABLET | Refills: 3 | Status: SHIPPED | OUTPATIENT
Start: 2025-02-24

## 2025-02-24 RX ORDER — ESOMEPRAZOLE MAGNESIUM 40 MG/1
40 CAPSULE, DELAYED RELEASE ORAL
Qty: 90 CAPSULE | Refills: 1 | Status: SHIPPED | OUTPATIENT
Start: 2025-02-24

## 2025-02-25 ENCOUNTER — TELEPHONE (OUTPATIENT)
Dept: NEPHROLOGY | Facility: CLINIC | Age: 70
End: 2025-02-25
Payer: COMMERCIAL

## 2025-02-25 ENCOUNTER — RESULTS FOLLOW-UP (OUTPATIENT)
Dept: NEPHROLOGY | Facility: CLINIC | Age: 70
End: 2025-02-25
Payer: COMMERCIAL

## 2025-02-25 DIAGNOSIS — R80.9 PROTEINURIA, UNSPECIFIED TYPE: ICD-10-CM

## 2025-02-25 DIAGNOSIS — N18.31 STAGE 3A CHRONIC KIDNEY DISEASE: ICD-10-CM

## 2025-02-25 DIAGNOSIS — R80.9 TYPE 2 DIABETES MELLITUS WITH PROTEINURIA: Primary | ICD-10-CM

## 2025-02-25 DIAGNOSIS — E11.29 TYPE 2 DIABETES MELLITUS WITH PROTEINURIA: Primary | ICD-10-CM

## 2025-02-25 RX ORDER — FINERENONE 10 MG/1
10 TABLET, FILM COATED ORAL DAILY
Qty: 30 TABLET | Refills: 2 | Status: SHIPPED | OUTPATIENT
Start: 2025-02-25 | End: 2025-02-26

## 2025-02-25 NOTE — TELEPHONE ENCOUNTER
----- Message from ANJU Beth sent at 2/25/2025  9:25 AM CST -----  Please let patient know that kidney function remains stable; however, he does have significant proteinuria still even taking Farxiga. We can start Kerendia to help combat that but he needs to return   in 2 weeks for repeat RFP. I only see one instance where he had hyperkalemia. If he is agreeable let me know. He will need a 3 month follow up. Thank you!  ----- Message -----  From: Lab, Background User  Sent: 2/20/2025  11:34 AM CST  To: ANJU Stoner

## 2025-02-25 NOTE — TELEPHONE ENCOUNTER
Spoke w/ CarynAbdoulaye, he is aware of him lab work. He agreed to taking kerendia. Repeat labs ordered. Appts r/s.

## 2025-02-25 NOTE — TELEPHONE ENCOUNTER
Spoke w/ , I let him know that Jackie was gone for the day, but that I would ask her in the morning if she wanted to do something else. He verbalized thanks.

## 2025-02-25 NOTE — TELEPHONE ENCOUNTER
----- Message from Elizabeth sent at 2/25/2025  4:14 PM CST -----  Regarding: RX Denied - Wanting Alternative  Who Called: Mikhail Badgerfinerenone (KERENDIA) 10 mg TabHe is wanting to know if something else can be prescribed since this medication was deniedPreferred Method of Contact: Phone CallPatient's Preferred Phone Number on File: [phone]

## 2025-02-26 ENCOUNTER — TELEPHONE (OUTPATIENT)
Dept: NEPHROLOGY | Facility: CLINIC | Age: 70
End: 2025-02-26
Payer: COMMERCIAL

## 2025-02-26 DIAGNOSIS — R80.9 PROTEINURIA, UNSPECIFIED TYPE: ICD-10-CM

## 2025-02-26 DIAGNOSIS — N18.31 STAGE 3A CHRONIC KIDNEY DISEASE: ICD-10-CM

## 2025-02-26 RX ORDER — FINERENONE 10 MG/1
10 TABLET, FILM COATED ORAL DAILY
Qty: 90 TABLET | Refills: 1 | Status: SHIPPED | OUTPATIENT
Start: 2025-02-26 | End: 2025-08-25

## 2025-02-26 NOTE — TELEPHONE ENCOUNTER
----- Message from ANJU Beth sent at 2/26/2025  8:33 AM CST -----  I don't know of anything else. He just needs to hydrate well, work on his diet (lean meats, vegetables, and fruits) Avoid high sodium foods and keep his BP well controlled and his diabetes. Which I don't think he has a formal diagnosis but he was prediabetic range on A1c 1 year ago.  ----- Message -----  From: Maurice Butts LPN  Sent: 2/26/2025   8:09 AM CST  To: ANJU Stoner    He was denied Kerendia. He wanted to know if there was something else that you wanted to do for him.

## 2025-02-26 NOTE — TELEPHONE ENCOUNTER
Spoke w/ , he is aware of the diet and the advice from Jackie. I also let him know that I do have co-pay cards that he can come get if he would like to try those, he stated that he would come get them.

## 2025-03-04 ENCOUNTER — RESULTS FOLLOW-UP (OUTPATIENT)
Dept: CARDIOLOGY | Facility: CLINIC | Age: 70
End: 2025-03-04

## 2025-03-06 RX ORDER — METOPROLOL SUCCINATE 25 MG/1
25 TABLET, EXTENDED RELEASE ORAL DAILY
COMMUNITY
End: 2025-03-06 | Stop reason: SDUPTHER

## 2025-03-06 NOTE — PROGRESS NOTES
Pt. Notified frequent extra beats on monitor start metoprolol xl 25mg daily per Dr. Simmons. Pt. Voiced understanding.

## 2025-03-10 ENCOUNTER — TELEPHONE (OUTPATIENT)
Dept: NEPHROLOGY | Facility: CLINIC | Age: 70
End: 2025-03-10
Payer: COMMERCIAL

## 2025-03-10 NOTE — TELEPHONE ENCOUNTER
----- Message from Katelin sent at 3/7/2025  8:50 AM CST -----  Regarding: Nori Kahn asking for call back for some denial on PA answers  Who Called: Nori KahnPreferred Method of Contact: Phone CallPreferred Phone Number : [phone] Information:  Nori Kahn with PA for Kerendia needing to get some information on denial.

## 2025-03-11 NOTE — TELEPHONE ENCOUNTER
--Spoke to pt.Pt. wanted to be sure medication was sent to mail order pharmacy. D/W pt. That the medication is going to be sent to mail order.--- Message from Kayli sent at 3/11/2025  2:03 PM CDT -----  Regarding: Rx refill update  Who Called: Mikhail StanfordRefill or New Rx:RefillRX Name and Strength: metoprolol succinate (TOPROL-XL) 25 MG 24 hr tablet - - How is the patient currently taking it? Take 25 mg by mouth once daily. - OralLocal or Mail Order: LocalList of preferred pharmacies on file: [facility] - ANISH Syed - [address] [facility] [address] AT Portal to Registered [facility] [place] [address]Jgacr: [phone] Fax: [phone]: Not open 24 hoursPreferred Method of Contact: Phone CallPatient's Preferred Phone Number on File: [phone] Additional Information: Pt. Stated he would like an update since he still has not confirmed from 03/06/02025 that his Rx has been sent to the pharmacy. I have informed him that Nurse Julian had put a request order in for the Rx just waiting on Dr. Simmons to sign Rx and have it sent to pharmacy.

## 2025-03-12 RX ORDER — METOPROLOL SUCCINATE 25 MG/1
25 TABLET, EXTENDED RELEASE ORAL DAILY
Qty: 90 TABLET | Refills: 1 | Status: SHIPPED | OUTPATIENT
Start: 2025-03-12 | End: 2025-03-12 | Stop reason: SDUPTHER

## 2025-03-12 RX ORDER — METOPROLOL SUCCINATE 25 MG/1
25 TABLET, EXTENDED RELEASE ORAL DAILY
Qty: 90 TABLET | Refills: 1 | Status: SHIPPED | OUTPATIENT
Start: 2025-03-12

## 2025-03-13 DIAGNOSIS — N40.1 BENIGN PROSTATIC HYPERPLASIA WITH INCOMPLETE BLADDER EMPTYING: ICD-10-CM

## 2025-03-13 DIAGNOSIS — R39.14 BENIGN PROSTATIC HYPERPLASIA WITH INCOMPLETE BLADDER EMPTYING: ICD-10-CM

## 2025-03-13 RX ORDER — TAMSULOSIN HYDROCHLORIDE 0.4 MG/1
1 CAPSULE ORAL
Qty: 90 CAPSULE | Refills: 1 | Status: SHIPPED | OUTPATIENT
Start: 2025-03-13

## 2025-03-16 DIAGNOSIS — I10 HYPERTENSION, UNSPECIFIED TYPE: ICD-10-CM

## 2025-03-17 RX ORDER — VALSARTAN AND HYDROCHLOROTHIAZIDE 160; 12.5 MG/1; MG/1
1 TABLET, FILM COATED ORAL
Qty: 90 TABLET | Refills: 1 | Status: SHIPPED | OUTPATIENT
Start: 2025-03-17

## 2025-03-17 RX ORDER — FLUTICASONE FUROATE, UMECLIDINIUM BROMIDE AND VILANTEROL TRIFENATATE 100; 62.5; 25 UG/1; UG/1; UG/1
POWDER RESPIRATORY (INHALATION)
Qty: 60 EACH | Refills: 1 | Status: SHIPPED | OUTPATIENT
Start: 2025-03-17

## 2025-03-25 ENCOUNTER — TELEPHONE (OUTPATIENT)
Dept: NEPHROLOGY | Facility: CLINIC | Age: 70
End: 2025-03-25
Payer: COMMERCIAL

## 2025-03-25 RX ORDER — FLUTICASONE FUROATE, UMECLIDINIUM BROMIDE AND VILANTEROL TRIFENATATE 100; 62.5; 25 UG/1; UG/1; UG/1
POWDER RESPIRATORY (INHALATION)
Qty: 60 EACH | Refills: 1 | Status: SHIPPED | OUTPATIENT
Start: 2025-03-25

## 2025-03-25 NOTE — TELEPHONE ENCOUNTER
Pt returned call to clinic. Sana took call, reports patient reported he takes trelegy currently. Will send to pharm with message.

## 2025-03-28 ENCOUNTER — TELEPHONE (OUTPATIENT)
Dept: NEPHROLOGY | Facility: CLINIC | Age: 70
End: 2025-03-28
Payer: COMMERCIAL

## 2025-03-28 DIAGNOSIS — R80.9 PROTEINURIA, UNSPECIFIED TYPE: ICD-10-CM

## 2025-03-28 DIAGNOSIS — E78.5 HYPERLIPIDEMIA, UNSPECIFIED HYPERLIPIDEMIA TYPE: ICD-10-CM

## 2025-03-28 DIAGNOSIS — N18.31 STAGE 3A CHRONIC KIDNEY DISEASE: ICD-10-CM

## 2025-03-28 RX ORDER — EZETIMIBE 10 MG/1
TABLET ORAL
Qty: 90 TABLET | Refills: 1 | Status: SHIPPED | OUTPATIENT
Start: 2025-03-28

## 2025-03-28 RX ORDER — FINERENONE 10 MG/1
10 TABLET, FILM COATED ORAL DAILY
Qty: 90 TABLET | Refills: 1 | Status: SHIPPED | OUTPATIENT
Start: 2025-03-28 | End: 2025-09-24

## 2025-03-28 NOTE — TELEPHONE ENCOUNTER
Spoke w/ , I let him know that the reason that its not getting approved is because he is not a diabetic. I let him know that I will talk with Jackie and see if there is something else that she wants to put him on and if so then I would let him know. He verbalized thanks.

## 2025-03-28 NOTE — TELEPHONE ENCOUNTER
----- Message from Denise sent at 3/28/2025 11:09 AM CDT -----  Who Called: Mikhail Sanchez is requesting assistance/information from provider's office.Pt is needing to speak to nurse. Pt says his insurance company is still denying his prescription finerenone (KERENDIA) 10 mg Tab.Preferred Method of Contact: Phone CallPatient's Preferred Phone Number on File: [phone] Best Call Back Number, if different:Additional Information:

## 2025-05-02 DIAGNOSIS — E78.5 HYPERLIPIDEMIA, UNSPECIFIED HYPERLIPIDEMIA TYPE: ICD-10-CM

## 2025-05-02 RX ORDER — ATORVASTATIN CALCIUM 40 MG/1
40 TABLET, FILM COATED ORAL
Qty: 90 TABLET | Refills: 1 | OUTPATIENT
Start: 2025-05-02

## 2025-05-23 RX ORDER — METOPROLOL SUCCINATE 25 MG/1
25 TABLET, EXTENDED RELEASE ORAL DAILY
Qty: 90 TABLET | Refills: 1 | Status: SHIPPED | OUTPATIENT
Start: 2025-05-23

## 2025-05-23 RX ORDER — FLUTICASONE FUROATE, UMECLIDINIUM BROMIDE AND VILANTEROL TRIFENATATE 100; 62.5; 25 UG/1; UG/1; UG/1
POWDER RESPIRATORY (INHALATION)
Qty: 60 EACH | Refills: 1 | Status: SHIPPED | OUTPATIENT
Start: 2025-05-23

## 2025-05-30 DIAGNOSIS — E78.5 HYPERLIPIDEMIA, UNSPECIFIED HYPERLIPIDEMIA TYPE: ICD-10-CM

## 2025-05-30 RX ORDER — ATORVASTATIN CALCIUM 40 MG/1
TABLET, FILM COATED ORAL
Qty: 90 TABLET | Refills: 1 | Status: SHIPPED | OUTPATIENT
Start: 2025-05-30

## 2025-05-30 NOTE — TELEPHONE ENCOUNTER
----- Message from Yasmin sent at 5/30/2025  9:41 AM CDT -----  Regarding: Med Refill Request  Pt called requesting refills of atorvastatin (LIPITOR) 40 MG tablet. Pharmacy: [facility]

## 2025-06-02 ENCOUNTER — OFFICE VISIT (OUTPATIENT)
Dept: NEPHROLOGY | Facility: CLINIC | Age: 70
End: 2025-06-02
Payer: COMMERCIAL

## 2025-06-02 VITALS
DIASTOLIC BLOOD PRESSURE: 68 MMHG | HEIGHT: 71 IN | BODY MASS INDEX: 33.74 KG/M2 | HEART RATE: 73 BPM | OXYGEN SATURATION: 93 % | WEIGHT: 241 LBS | SYSTOLIC BLOOD PRESSURE: 122 MMHG | RESPIRATION RATE: 18 BRPM

## 2025-06-02 DIAGNOSIS — N18.32 STAGE 3B CHRONIC KIDNEY DISEASE: Primary | ICD-10-CM

## 2025-06-02 DIAGNOSIS — R80.9 PROTEINURIA, UNSPECIFIED TYPE: ICD-10-CM

## 2025-06-02 PROCEDURE — 99999 PR PBB SHADOW E&M-EST. PATIENT-LVL V: CPT | Mod: PBBFAC,,, | Performed by: INTERNAL MEDICINE

## 2025-06-02 PROCEDURE — 99214 OFFICE O/P EST MOD 30 MIN: CPT | Mod: S$PBB,,, | Performed by: INTERNAL MEDICINE

## 2025-06-02 PROCEDURE — 99215 OFFICE O/P EST HI 40 MIN: CPT | Mod: PBBFAC | Performed by: INTERNAL MEDICINE

## 2025-06-02 RX ORDER — PANTOPRAZOLE SODIUM 40 MG/1
40 TABLET, DELAYED RELEASE ORAL
COMMUNITY
Start: 2025-05-08

## 2025-06-04 ENCOUNTER — TELEPHONE (OUTPATIENT)
Dept: NEPHROLOGY | Facility: CLINIC | Age: 70
End: 2025-06-04
Payer: COMMERCIAL

## 2025-06-04 ENCOUNTER — RESULTS FOLLOW-UP (OUTPATIENT)
Dept: NEPHROLOGY | Facility: CLINIC | Age: 70
End: 2025-06-04

## 2025-06-04 DIAGNOSIS — D47.2 IGG MONOCLONAL GAMMOPATHY: Primary | ICD-10-CM

## 2025-06-14 DIAGNOSIS — N18.31 STAGE 3A CHRONIC KIDNEY DISEASE: ICD-10-CM

## 2025-06-14 DIAGNOSIS — J30.9 ALLERGIC RHINITIS, UNSPECIFIED SEASONALITY, UNSPECIFIED TRIGGER: ICD-10-CM

## 2025-06-16 RX ORDER — FLUTICASONE PROPIONATE 50 MCG
2 SPRAY, SUSPENSION (ML) NASAL
Qty: 48 G | Refills: 1 | Status: SHIPPED | OUTPATIENT
Start: 2025-06-16

## 2025-06-16 RX ORDER — DAPAGLIFLOZIN 10 MG/1
10 TABLET, FILM COATED ORAL
Qty: 90 TABLET | Refills: 3 | Status: SHIPPED | OUTPATIENT
Start: 2025-06-16

## 2025-07-08 ENCOUNTER — OFFICE VISIT (OUTPATIENT)
Dept: CARDIOLOGY | Facility: CLINIC | Age: 70
End: 2025-07-08
Payer: MEDICARE

## 2025-07-08 VITALS
HEART RATE: 72 BPM | RESPIRATION RATE: 18 BRPM | BODY MASS INDEX: 32.48 KG/M2 | WEIGHT: 232 LBS | SYSTOLIC BLOOD PRESSURE: 120 MMHG | HEIGHT: 71 IN | DIASTOLIC BLOOD PRESSURE: 70 MMHG

## 2025-07-08 DIAGNOSIS — I10 ESSENTIAL HYPERTENSION: ICD-10-CM

## 2025-07-08 DIAGNOSIS — D47.2 IGG MONOCLONAL GAMMOPATHY: ICD-10-CM

## 2025-07-08 DIAGNOSIS — I50.32 CHRONIC HEART FAILURE WITH PRESERVED EJECTION FRACTION: Primary | ICD-10-CM

## 2025-07-08 DIAGNOSIS — I10 ESSENTIAL HYPERTENSION: Primary | ICD-10-CM

## 2025-07-08 PROCEDURE — 99214 OFFICE O/P EST MOD 30 MIN: CPT | Mod: S$PBB,,, | Performed by: STUDENT IN AN ORGANIZED HEALTH CARE EDUCATION/TRAINING PROGRAM

## 2025-07-08 PROCEDURE — 99214 OFFICE O/P EST MOD 30 MIN: CPT | Mod: PBBFAC | Performed by: STUDENT IN AN ORGANIZED HEALTH CARE EDUCATION/TRAINING PROGRAM

## 2025-07-08 PROCEDURE — 93010 ELECTROCARDIOGRAM REPORT: CPT | Mod: S$PBB,,, | Performed by: STUDENT IN AN ORGANIZED HEALTH CARE EDUCATION/TRAINING PROGRAM

## 2025-07-08 PROCEDURE — 93005 ELECTROCARDIOGRAM TRACING: CPT | Mod: PBBFAC | Performed by: STUDENT IN AN ORGANIZED HEALTH CARE EDUCATION/TRAINING PROGRAM

## 2025-07-08 PROCEDURE — 99999 PR PBB SHADOW E&M-EST. PATIENT-LVL IV: CPT | Mod: PBBFAC,,, | Performed by: STUDENT IN AN ORGANIZED HEALTH CARE EDUCATION/TRAINING PROGRAM

## 2025-07-08 NOTE — PROGRESS NOTES
PCP: Vishnu Ballesteros MD    Referring Provider:     Subjective:   Mikhail Stanford is a 70 y.o. male with hx of HTN, HLD who presents for followup    7/8/25 - Reports exertional fatigue and SOB. NYHA III symptoms. Underwent free light chain [id number] for CKD and found to be positive for IgG Kappa monoclonal gammopathy and referred to hematology by Dr. Avila. My suspicion is that patient has cardiac amyloid as well has amyloid nephropathy. Will get ECHO and Pyp scan    1/8/25 - Doing well. Denies any complaints. Reports having dizziness last few months. Attributes it to bouts of cough and SOB    10/9/24 - patient reports sharp left sided chest pain. Worse on deep breathing. Dr. Ballesteros prescribed antibiotics that he has not received. I reviewed his [facility] images with him and reassured him that his chest pain is unlikely related to CAD.     6/28/23 - Patient continues to have exertional fatigue and SOB despite being on therapy for COPD. His BP is low for initiation of BB or CCB    5/27/22 - Patient states he has some shortness of breath.  He does not exercise often. Blood pressure controlled.  Has follow up with Dr. Castellano     2/14/22 - Patient of Dr. Castellano. Patient reports chronic issues with shortness of breath at rest and with exertion. Associated with runny nose. Episode occur daily. For last 2 years.     Fhx: non-contributary  Shx: Denies smoking, Hx of remote drug user (>30 years). No etoh    EKG 2/14/22 - NSR - normal  ECHO 2/2022 - normal LV and size fxn. Mild LAE, mitral valve thickening without stenosis. Indeterminate diastolic dysfunction  STRESS TEST 2/14/22 - negative for ischemia.  The patient exercised for 6 minutes 43 seconds, functional capacity of 8 METS.  The patient reported shortness of breath during the stress test. Denied chest pain/pressure/tightness.     Cath Results for orders placed during the hospital encounter of 07/10/23    The coronary arteries were normal..    The left  ventricular end diastolic pressure was normal.    The pre-procedure left ventricular end diastolic pressure was 10.    The estimated blood loss was none.    dateIITiansopatch Cardiac Monitor - 3-15 Day Adult (Cupid Only)  Result Date: 3/3/2025    The predominant rhythm is sinus.    10% burden of PAC (premature atrial contractions)    Patient had a min HR of 65 bpm, max HR of 218 bpm, and avg HR of 103 bpm.   Predominant underlying rhythm was Sinus Rhythm. 2 Supraventricular   Tachycardia runs occurred, the run with the fastest interval lasting 5   beats with a max rate of 218 bpm, the longest lasting 5 beats with an avg   rate of 143 bpm. Isolated SVEs were frequent (10.6%, 200000), SVE Couplets   were rare (<1.0%, 2095), and SVE Triplets were rare (<1.0%, 493). Isolated   VEs were rare (<1.0%, 7564), and no VE Couplets or VE Triplets were   present.                 Lab Results   Component Value Date     06/02/2025    K 3.9 06/02/2025     06/02/2025    CO2 29 06/02/2025    BUN 28 (H) 06/02/2025    CREATININE 2.05 (H) 06/02/2025    CALCIUM 9.7 06/02/2025    ANIONGAP 9 06/02/2025    ESTGFRAFRICA 59 (L) 10/04/2021    EGFRNONAA 43 (L) 06/13/2022       Lab Results   Component Value Date    CHOL 144 10/07/2024    CHOL 149 03/07/2024    CHOL 157 01/31/2023     Lab Results   Component Value Date    HDL 62 (H) 10/07/2024    HDL 63 (H) 03/07/2024    HDL 65 (H) 01/31/2023     Lab Results   Component Value Date    LDLCALC 64 10/07/2024    LDLCALC 73 03/07/2024    LDLCALC 73 01/31/2023     Lab Results   Component Value Date    TRIG 88 10/07/2024    TRIG 65 03/07/2024    TRIG 96 01/31/2023     Lab Results   Component Value Date    CHOLHDL 2.3 10/07/2024    CHOLHDL 2.4 03/07/2024    CHOLHDL 2.4 01/31/2023       Lab Results   Component Value Date    WBC 5.72 06/02/2025    HGB 16.3 06/02/2025    HCT 50.9 06/02/2025    MCV 92.0 06/02/2025     06/02/2025           Review of Systems   Constitutional:  Positive for  "malaise/fatigue.   Respiratory:  Positive for shortness of breath. Negative for cough.    Cardiovascular:  Negative for chest pain, palpitations, orthopnea, claudication, leg swelling and PND.         Objective:   /70   Pulse 72   Resp 18   Ht 5' 11" (1.803 m)   Wt 105.2 kg (232 lb)   BMI 32.36 kg/m²     Physical Exam  Vitals and nursing note reviewed.   Cardiovascular:      Rate and Rhythm: Normal rate and regular rhythm.      Pulses: Normal pulses.      Heart sounds: Normal heart sounds.   Pulmonary:      Breath sounds: Normal breath sounds.   Musculoskeletal:      Right lower leg: No edema.      Left lower leg: No edema.   Neurological:      Mental Status: He is oriented to person, place, and time.           Assessment:     1. Chronic heart failure with preserved ejection fraction  Echo      2. IgG monoclonal gammopathy        3. Essential hypertension  EKG 12-lead              Plan:   IgG monoclonal gammopathy  Patient with IgG kappa monoclonal gammapathy done for CKD testing  - given SOB and fatigue concern for cardiac involvement  - Will get ECHO and PYP scan   - Patient has been referred to hematology by Dr. Avila                  "

## 2025-07-08 NOTE — ASSESSMENT & PLAN NOTE
Patient with IgG kappa monoclonal gammapathy done for CKD testing  - given SOB and fatigue concern for cardiac involvement  - Will get ECHO and PYP scan   - Patient has been referred to hematology by Dr. Avila

## 2025-07-10 LAB
OHS QRS DURATION: 68 MS
OHS QTC CALCULATION: 424 MS

## 2025-07-11 ENCOUNTER — OFFICE VISIT (OUTPATIENT)
Dept: FAMILY MEDICINE | Facility: CLINIC | Age: 70
End: 2025-07-11
Payer: MEDICARE

## 2025-07-11 VITALS
TEMPERATURE: 99 F | DIASTOLIC BLOOD PRESSURE: 80 MMHG | WEIGHT: 231 LBS | HEIGHT: 71 IN | RESPIRATION RATE: 20 BRPM | SYSTOLIC BLOOD PRESSURE: 128 MMHG | BODY MASS INDEX: 32.34 KG/M2 | HEART RATE: 91 BPM | OXYGEN SATURATION: 97 %

## 2025-07-11 DIAGNOSIS — I10 HYPERTENSION, UNSPECIFIED TYPE: Primary | ICD-10-CM

## 2025-07-11 LAB
ALBUMIN SERPL BCP-MCNC: 3.6 G/DL (ref 3.4–4.8)
ALBUMIN/GLOB SERPL: 1.2 {RATIO}
ALP SERPL-CCNC: 64 U/L (ref 40–150)
ALT SERPL W P-5'-P-CCNC: 23 U/L
ANION GAP SERPL CALCULATED.3IONS-SCNC: 10 MMOL/L (ref 7–16)
AST SERPL W P-5'-P-CCNC: 21 U/L (ref 11–45)
BASOPHILS # BLD AUTO: 0.04 K/UL (ref 0–0.2)
BASOPHILS NFR BLD AUTO: 0.8 % (ref 0–1)
BILIRUB SERPL-MCNC: 0.9 MG/DL
BUN SERPL-MCNC: 22 MG/DL (ref 8–26)
BUN/CREAT SERPL: 12 (ref 6–20)
CALCIUM SERPL-MCNC: 9.2 MG/DL (ref 8.8–10)
CHLORIDE SERPL-SCNC: 104 MMOL/L (ref 98–107)
CO2 SERPL-SCNC: 27 MMOL/L (ref 23–31)
CREAT SERPL-MCNC: 1.87 MG/DL (ref 0.72–1.25)
DIFFERENTIAL METHOD BLD: ABNORMAL
EGFR (NO RACE VARIABLE) (RUSH/TITUS): 39 ML/MIN/1.73M2
EOSINOPHIL # BLD AUTO: 0.11 K/UL (ref 0–0.5)
EOSINOPHIL NFR BLD AUTO: 2.2 % (ref 1–4)
ERYTHROCYTE [DISTWIDTH] IN BLOOD BY AUTOMATED COUNT: 11.8 % (ref 11.5–14.5)
GLOBULIN SER-MCNC: 3 G/DL (ref 2–4)
GLUCOSE SERPL-MCNC: 98 MG/DL (ref 82–115)
HCT VFR BLD AUTO: 48.7 % (ref 40–54)
HGB BLD-MCNC: 15.9 G/DL (ref 13.5–18)
IMM GRANULOCYTES # BLD AUTO: 0.02 K/UL (ref 0–0.04)
IMM GRANULOCYTES NFR BLD: 0.4 % (ref 0–0.4)
LYMPHOCYTES # BLD AUTO: 1.4 K/UL (ref 1–4.8)
LYMPHOCYTES NFR BLD AUTO: 28 % (ref 27–41)
MCH RBC QN AUTO: 29 PG (ref 27–31)
MCHC RBC AUTO-ENTMCNC: 32.6 G/DL (ref 32–36)
MCV RBC AUTO: 88.9 FL (ref 80–96)
MONOCYTES # BLD AUTO: 0.54 K/UL (ref 0–0.8)
MONOCYTES NFR BLD AUTO: 10.8 % (ref 2–6)
MPC BLD CALC-MCNC: 10 FL (ref 9.4–12.4)
NEUTROPHILS # BLD AUTO: 2.89 K/UL (ref 1.8–7.7)
NEUTROPHILS NFR BLD AUTO: 57.8 % (ref 53–65)
NRBC # BLD AUTO: 0 X10E3/UL
NRBC, AUTO (.00): 0 %
PLATELET # BLD AUTO: 241 K/UL (ref 150–400)
POTASSIUM SERPL-SCNC: 4.4 MMOL/L (ref 3.5–5.1)
PROT SERPL-MCNC: 6.6 G/DL (ref 5.8–7.6)
RBC # BLD AUTO: 5.48 M/UL (ref 4.6–6.2)
SODIUM SERPL-SCNC: 137 MMOL/L (ref 136–145)
WBC # BLD AUTO: 5 K/UL (ref 4.5–11)

## 2025-07-11 NOTE — PROGRESS NOTES
Mikhail Stanford is a 70 y.o. male seen today for follow-up on his hypertension.  Patient also has persistent episodes of shortness a breath and now is complaining of night sweats.  His cardiac and pulmonology workup is ongoing and I mentioned an overnight O2 evaluation to make sure he is not desatting while sleeping.  I also recommended week toes with peanut butter as a late night snack before bed to maintain his blood sugar.  The patient is not diabetic but is on Farxiga for his renal function.    Past Medical History:   Diagnosis Date    COPD (chronic obstructive pulmonary disease)     Hypertension     Sleep apnea      Family History   Problem Relation Name Age of Onset    Cancer Mother Marizol Stanford     Diabetes Sister      Hyperlipidemia Sister       Medications Ordered Prior to Encounter[1]  Immunization History   Administered Date(s) Administered    COVID-19, MRNA, LN-S, PF (MODERNA FULL 0.5 ML DOSE) 03/03/2021, 04/02/2021    COVID-19, MRNA, LN-S, PF (Pfizer) (Gray Cap) 05/06/2022    COVID-19, MRNA, LN-S, PF (Pfizer) (Purple Cap) 11/05/2021    COVID-19, mRNA, LNP-S, PF (Moderna) Ages 12+ 01/23/2024    COVID-19, mRNA, LNP-S, PF, adelfo-sucrose, 30 mcg/0.3 mL (Pfizer Ages 12+) 08/12/2024, 01/27/2025    COVID-19, mRNA, LNP-S, bivalent booster, PF (PFIZER OMICRON) 07/12/2023    Influenza (FLUAD) - Quadrivalent - Adjuvanted - PF *Preferred* (65+) 12/21/2023    Influenza (FLUBLOK) - Quadrivalent - Recombinant - PF *Preferred* (egg allergy) 01/27/2025    Influenza - Quadrivalent - High Dose - PF (65 years and older) 11/19/2022    Influenza - Quadrivalent - PF *Preferred* (6 months and older) 11/05/2021    Influenza - Trivalent - Flublok - PF (18 years and older) 01/27/2025    Influenza - Trivalent - Fluzone High Dose - PF (65 years and older) 08/12/2024    MMR 08/29/2017, 09/27/2017    Pneumococcal Conjugate - 20 Valent 01/30/2023    RSVpreF (Arexvy) 09/25/2023    Zoster Recombinant 07/12/2023       Review of Systems    Constitutional:  Positive for malaise/fatigue. Negative for fever and weight loss.   Respiratory:  Positive for shortness of breath.    Cardiovascular:  Negative for chest pain and palpitations.   Gastrointestinal:  Negative for nausea and vomiting.   Psychiatric/Behavioral:  Negative for depression.         Vitals:    07/11/25 1046   BP: 128/80   Pulse: 91   Resp: 20   Temp: 98.6 °F (37 °C)       Physical Exam  Vitals reviewed.   Constitutional:       Appearance: Normal appearance.   HENT:      Head: Normocephalic.   Eyes:      Extraocular Movements: Extraocular movements intact.      Conjunctiva/sclera: Conjunctivae normal.      Pupils: Pupils are equal, round, and reactive to light.   Neck:      Thyroid: No thyroid mass or thyromegaly.   Cardiovascular:      Rate and Rhythm: Normal rate and regular rhythm.      Heart sounds: Normal heart sounds. No murmur heard.     No gallop.   Pulmonary:      Effort: Pulmonary effort is normal. No respiratory distress.      Breath sounds: Normal breath sounds. No wheezing or rales.   Skin:     General: Skin is warm and dry.      Coloration: Skin is not jaundiced or pale.   Neurological:      Mental Status: He is alert.   Psychiatric:         Mood and Affect: Mood normal.         Behavior: Behavior normal.         Thought Content: Thought content normal.         Judgment: Judgment normal.          Assessment and Plan  1. Hypertension, unspecified type  -     CBC Auto Differential; Future; Expected date: 07/11/2025  -     Comprehensive Metabolic Panel; Future; Expected date: 07/11/2025             Return to clinic in 6 weeks for follow-up after his follow-up with Cardiology and pulmonology.  An overnight O2 and will be ordered.    Health Maintenance Topics with due status: Not Due       Topic Last Completion Date    Hemoglobin A1c (Diabetic Prevention Screening) 04/21/2023    Colorectal Cancer Screening 07/29/2024    Lipid Panel 10/07/2024    Influenza Vaccine 01/27/2025     Annual [facility] 06/02/2025              [1]   Current Outpatient Medications on File Prior to Visit   Medication Sig Dispense Refill    albuterol-budesonide (AIRSUPRA) 90-80 mcg/actuation USE 2 INHALATIONS ORALLY   EVERY 6 HOURS AS NEEDED FORSHORTNESS OF BREATH. 10.7 g 5    atorvastatin (LIPITOR) 40 MG tablet TAKE 1 TABLET DAILY 90 tablet 1    brimonidine 0.2% (ALPHAGAN) 0.2 % Drop Place 1 drop into the left eye 2 (two) times a day.      dapagliflozin propanediol (FARXIGA) 10 mg tablet TAKE 1 TABLET ONCE DAILY 90 tablet 3    ezetimibe (ZETIA) 10 mg tablet TAKE 1 TABLET DAILY 90 tablet 1    fluticasone propionate (FLONASE) 50 mcg/actuation nasal spray USE 2 SPRAYS IN EACH       NOSTRIL ONCE DAILY 48 g 1    metoprolol succinate (TOPROL-XL) 25 MG 24 hr tablet Take 1 tablet (25 mg total) by mouth once daily. 90 tablet 1    pantoprazole (PROTONIX) 40 MG tablet Take 40 mg by mouth.      tadalafiL (CIALIS) 20 MG Tab TAKE 1 TABLET DAILY AS     NEEDED (INTERCOURSE). 18 tablet 3    tamsulosin (FLOMAX) 0.4 mg Cap TAKE 1 CAPSULE BY MOUTH EVERY DAY 90 capsule 1    theophylline (THEODUR) 300 MG 12 hr tablet Take 1 tablet (300 mg total) by mouth 2 (two) times daily. 60 tablet 11    TRELEGY ELLIPTA 100-62.5-25 mcg DsDv USE 1 INHALATION ORALLY    DAILY. 60 each 1    valsartan-hydrochlorothiazide (DIOVAN-HCT) 160-12.5 mg per tablet TAKE 1 TABLET ONCE DAILY 90 tablet 1    vibegron 75 mg Tab Take one tablet daily 90 tablet 3     No current facility-administered medications on file prior to visit.

## 2025-07-14 ENCOUNTER — TELEPHONE (OUTPATIENT)
Dept: FAMILY MEDICINE | Facility: CLINIC | Age: 70
End: 2025-07-14
Payer: COMMERCIAL

## 2025-07-14 NOTE — TELEPHONE ENCOUNTER
----- Message from Vishnu Ballesteros MD sent at 7/14/2025  8:01 AM CDT -----  Mildly improved renal function although still impaired.  Recommend nephrology follow-up.  ----- Message -----  From: Lab, Background User  Sent: 7/11/2025   5:22 PM CDT  To: Vishnu Ballesteros MD

## 2025-07-17 ENCOUNTER — HOSPITAL ENCOUNTER (OUTPATIENT)
Dept: CARDIOLOGY | Facility: HOSPITAL | Age: 70
Discharge: HOME | End: 2025-07-17
Attending: STUDENT IN AN ORGANIZED HEALTH CARE EDUCATION/TRAINING PROGRAM
Payer: COMMERCIAL

## 2025-07-17 DIAGNOSIS — I50.32 CHRONIC HEART FAILURE WITH PRESERVED EJECTION FRACTION: ICD-10-CM

## 2025-07-17 LAB
AORTIC ROOT ANNULUS: 2.7 CM
AORTIC VALVE CUSP SEPERATION: 2.08 CM
AV INDEX (PROSTH): 0.92
AV MEAN GRADIENT: 4 MMHG
AV PEAK GRADIENT: 7 MMHG
AV REGURGITATION PRESSURE HALF TIME: 481 MS
AV VALVE AREA BY VELOCITY RATIO: 1.9 CM²
AV VALVE AREA: 2.1 CM²
AV VELOCITY RATIO: 0.85
CV ECHO LV RWT: 0.5 CM
DOP CALC AO PEAK VEL: 1.3 M/S
DOP CALC AO VTI: 28.7 CM
DOP CALC LVOT AREA: 2.3 CM2
DOP CALC LVOT DIAMETER: 1.7 CM
DOP CALC LVOT PEAK VEL: 1.1 M/S
DOP CALC LVOT STROKE VOLUME: 59.9 CM3
DOP CALC MV VTI: 31.8 CM
DOP CALCLVOT PEAK VEL VTI: 26.4 CM
E WAVE DECELERATION TIME: 254 MSEC
E/A RATIO: 0.78
E/E' RATIO: 13 M/S
ECHO LV POSTERIOR WALL: 0.9 CM (ref 0.6–1.1)
FRACTIONAL SHORTENING: 22.2 % (ref 28–44)
INTERVENTRICULAR SEPTUM: 1.1 CM (ref 0.6–1.1)
IVC DIAMETER: 1.49 CM
LEFT ATRIUM AREA SYSTOLIC (APICAL 2 CHAMBER): 22.86 CM2
LEFT ATRIUM AREA SYSTOLIC (APICAL 4 CHAMBER): 9.57 CM2
LEFT ATRIUM VOLUME MOD: 37 ML
LEFT INTERNAL DIMENSION IN SYSTOLE: 2.8 CM (ref 2.1–4)
LEFT VENTRICLE DIASTOLIC VOLUME: 56 ML
LEFT VENTRICLE END SYSTOLIC VOLUME APICAL 2 CHAMBER: 69.69 ML
LEFT VENTRICLE END SYSTOLIC VOLUME APICAL 4 CHAMBER: 14.95 ML
LEFT VENTRICLE SYSTOLIC VOLUME: 28 ML
LEFT VENTRICULAR INTERNAL DIMENSION IN DIASTOLE: 3.6 CM (ref 3.5–6)
LEFT VENTRICULAR MASS: 107.9 G
LV LATERAL E/E' RATIO: 15.3 M/S
LV SEPTAL E/E' RATIO: 11.5 M/S
LVED V (TEICH): 55.6 ML
LVES V (TEICH): 28.4 ML
LVOT MG: 2.5 MMHG
LVOT MV: 0.73 CM/S
MV MEAN GRADIENT: 3 MMHG
MV PEAK A VEL: 1.18 M/S
MV PEAK E VEL: 0.92 M/S
MV PEAK GRADIENT: 5 MMHG
MV STENOSIS PRESSURE HALF TIME: 73.54 MS
MV VALVE AREA BY CONTINUITY EQUATION: 1.88 CM2
MV VALVE AREA P 1/2 METHOD: 2.99 CM2
OHS CV RV/LV RATIO: 0.78 CM
PISA AR MAX VEL: 3.36 M/S
PISA MRMAX VEL: 4.47 M/S
PISA TR MAX VEL: 3 M/S
PV PEAK GRADIENT: 2 MMHG
PV PEAK VELOCITY: 0.77 M/S
RA PRESSURE ESTIMATED: 3 MMHG
RA VOL SYS: 23.47 ML
RIGHT ATRIAL AREA: 11.6 CM2
RIGHT ATRIUM VOLUME AREA LENGTH APICAL 4 CHAMBER: 23.02 ML
RIGHT VENTRICLE DIASTOLIC BASEL DIMENSION: 2.8 CM
RIGHT VENTRICLE DIASTOLIC LENGTH: 7.7 CM
RIGHT VENTRICLE DIASTOLIC MID DIMENSION: 2.4 CM
RIGHT VENTRICULAR LENGTH IN DIASTOLE (APICAL 4-CHAMBER VIEW): 7.74 CM
RV MID DIAMA: 2.38 CM
RV TB RVSP: 6 MMHG
TDI LATERAL: 0.06 M/S
TDI SEPTAL: 0.08 M/S
TDI: 0.07 M/S
TR MAX PG: 37 MMHG
TRICUSPID ANNULAR PLANE SYSTOLIC EXCURSION: 1.6 CM
TV REST PULMONARY ARTERY PRESSURE: 39 MMHG

## 2025-07-17 PROCEDURE — 93306 TTE W/DOPPLER COMPLETE: CPT

## 2025-07-22 ENCOUNTER — TELEPHONE (OUTPATIENT)
Dept: FAMILY MEDICINE | Facility: CLINIC | Age: 70
End: 2025-07-22
Payer: COMMERCIAL

## 2025-07-22 RX ORDER — FLUTICASONE FUROATE, UMECLIDINIUM BROMIDE AND VILANTEROL TRIFENATATE 100; 62.5; 25 UG/1; UG/1; UG/1
POWDER RESPIRATORY (INHALATION)
Qty: 60 EACH | Refills: 1 | Status: SHIPPED | OUTPATIENT
Start: 2025-07-22

## 2025-07-22 NOTE — TELEPHONE ENCOUNTER
Pt contacted. Notified patient that report will be requested from [facility] for Dr Ballesteros to further review. He voiced understanding and agreed.

## 2025-07-22 NOTE — TELEPHONE ENCOUNTER
----- Message from Yasmin sent at 7/22/2025  2:55 PM CDT -----  Regarding: Results  Pt called stating he had a test done recently that Dr Ballesteros ordered that someone brought to their house. He states it was supposed to check his oxygen levels while he slept but states they have never heard back about the results.

## 2025-07-24 ENCOUNTER — HOSPITAL ENCOUNTER (OUTPATIENT)
Dept: RADIOLOGY | Facility: HOSPITAL | Age: 70
Discharge: HOME | End: 2025-07-24
Attending: INTERNAL MEDICINE
Payer: COMMERCIAL

## 2025-07-24 DIAGNOSIS — R22.2 LOCALIZED SWELLING, MASS AND LUMP, TRUNK: ICD-10-CM

## 2025-07-24 PROCEDURE — 71250 CT THORAX DX C-: CPT | Mod: TC

## 2025-07-30 ENCOUNTER — OFFICE VISIT (OUTPATIENT)
Dept: PULMONOLOGY | Facility: CLINIC | Age: 70
End: 2025-07-30
Payer: COMMERCIAL

## 2025-07-30 VITALS
HEART RATE: 86 BPM | WEIGHT: 232.19 LBS | HEIGHT: 71 IN | DIASTOLIC BLOOD PRESSURE: 70 MMHG | BODY MASS INDEX: 32.51 KG/M2 | OXYGEN SATURATION: 97 % | SYSTOLIC BLOOD PRESSURE: 120 MMHG | RESPIRATION RATE: 18 BRPM

## 2025-07-30 DIAGNOSIS — R91.1 SOLITARY PULMONARY NODULE: Primary | ICD-10-CM

## 2025-07-30 PROCEDURE — 99999 PR PBB SHADOW E&M-EST. PATIENT-LVL V: CPT | Mod: PBBFAC,,, | Performed by: INTERNAL MEDICINE

## 2025-07-30 PROCEDURE — 99214 OFFICE O/P EST MOD 30 MIN: CPT | Mod: S$PBB,,, | Performed by: INTERNAL MEDICINE

## 2025-07-30 PROCEDURE — 99215 OFFICE O/P EST HI 40 MIN: CPT | Mod: PBBFAC | Performed by: INTERNAL MEDICINE

## 2025-07-30 NOTE — PROGRESS NOTES
Subjective:       Patient ID: Mikhail Stanford is a 70 y.o. male.    Chief Complaint: Pulmonary Nodules (Had CT last week)    History of Present Illness    CHIEF COMPLAINT:  Mr. Stanford presents today for follow up of a lung nodule.    PULMONARY:  He has been following a lung nodule for two years, which currently measures 5 mm with minimal risk of malignancy. He experiences shortness of breath after eating, at night, and with minimal exertion, which previously affected his ability to speak. His breathing has improved somewhat since starting oxygen therapy prescribed by Dr. Ballesteros. He uses oxygen as needed, primarily at night. He reports being mostly able to perform desired activities despite respiratory symptoms.    MEDICATIONS:  He continues Trelegy and Theophylline for COPD management, along with Air Supra. He reports minimal to no use of rescue inhaler.    SOCIAL HISTORY:  Former smoker who quit in [id number].      ROS:  General: -fever, -chills, -fatigue, -weight gain, -weight loss  Eyes: -vision changes, -redness, -discharge  ENT: -ear pain, -nasal congestion, -sore throat  Cardiovascular: -chest pain, -palpitations, -lower extremity edema  Respiratory: -cough, +shortness of breath, +difficulty breathing, +dyspnea at rest  Gastrointestinal: -abdominal pain, -nausea, -vomiting, -diarrhea, -constipation, -blood in stool  Genitourinary: -dysuria, -hematuria, -frequency  Musculoskeletal: -joint pain, -muscle pain  Skin: -rash, -lesion  Neurological: -headache, -dizziness, -numbness, -tingling  Psychiatric: -anxiety, -depression, -sleep difficulty          Objective:      Physical Exam   Constitutional: He is oriented to person, place, and time. He appears well-developed and well-nourished.   HENT:   Head: Normocephalic.   Nose: Nose normal.   Mouth/Throat: Oropharynx is clear and moist.   Neck: No JVD present. No thyromegaly present.   Cardiovascular: Normal rate, regular rhythm, normal heart sounds and intact  "distal pulses.   Pulmonary/Chest: Normal expansion, hyperinflation, symmetric chest wall expansion, effort normal and breath sounds normal.   Abdominal: Soft. Bowel sounds are normal.   Musculoskeletal:         General: Normal range of motion.      Cervical back: Normal range of motion and neck supple.   Lymphadenopathy: No supraclavicular adenopathy is present.     He has no cervical adenopathy.   Neurological: He is alert and oriented to person, place, and time. He has normal reflexes.   Skin: Skin is warm and dry.   Psychiatric: He has a normal mood and affect. His behavior is normal.     Personal Diagnostic Review  none pertinent        7/30/2025     1:14 PM 7/11/2025    10:46 AM 7/8/2025     9:27 AM 6/2/2025     2:52 PM 2/20/2025     9:49 AM 2/7/2025    12:22 PM 2/6/2025    10:19 AM   Pulmonary Function Tests   SpO2 97 % 97 %  93 % 95 %  94 %   Height 5' 11" (1.803 m) 5' 11" (1.803 m) 5' 11" (1.803 m) 5' 11" (1.803 m) 5' 11" (1.803 m) 5' 11" (1.803 m) 5' 11" (1.803 m)   Weight 105.3 kg (232 lb 3.2 oz) 104.8 kg (231 lb) 105.2 kg (232 lb) 109.3 kg (241 lb) 109.3 kg (241 lb) 106.1 kg (234 lb) 106.3 kg (234 lb 6.4 oz)   BMI (Calculated) 32.4 32.2 32.4 33.6 33.6 32.7 32.7           Current Medications[1]   Assessment:       No diagnosis found.    Encounter Medications[2]  No orders of the defined types were placed in this encounter.      Plan:       Problem List Items Addressed This Visit    None        Assessment & Plan    IMPRESSION:  - Reviewed CT results from 1 week ago, noting 5 mm lung spot has been followed for 2 years.  - Assessed lung nodule risk as close to zero given 2-year stability and smoking cessation >15 years ago.  - Evaluated current respiratory status and medication regimen.  - Noted patient recently started on oxygen therapy by Dr. Ballesteros for breathing difficulties.  - Reviewed previous pulmonary function test from 2 years ago, which showed good results.  - COPD status is mild based on " previous testing and current symptoms.  - Determined no changes needed to current treatment plan at this time.    CHRONIC OBSTRUCTIVE PULMONARY DISEASE (COPD):  - Continue Trelegy.  - Continue Airsupra (rescue inhaler) as needed.  - Continue Theophylline.  - Continue oxygen therapy as needed, particularly at night and after eating.  - Repeat breathing test may be necessary at next visit if symptoms worsen.    SMOKING CESSATION:  - Explained that smoking cessation for >15 years reduces lung cancer risk to near normal levels.  - Discussed that annual CTs are typically only recommended for those who quit smoking less than 15 years ago.    GENERAL HEALTH MANAGEMENT:  - Mr. Stanford to keep weight down.  - Mr. Stanford to stay active.    FOLLOW-UP CARE:  - Follow up if symptoms change or worsen.            This note was generated with the assistance of ambient listening technology. Verbal consent was obtained by the patient and accompanying visitor(s) for the recording of patient appointment to facilitate this note. I attest to having reviewed and edited the generated note for accuracy, though some syntax or spelling errors may persist. Please contact the author of this note for any clarification.                  [1]   Current Outpatient Medications:     albuterol-budesonide (AIRSUPRA) 90-80 mcg/actuation, USE 2 INHALATIONS ORALLY   EVERY 6 HOURS AS NEEDED FORSHORTNESS OF BREATH., Disp: 10.7 g, Rfl: 5    atorvastatin (LIPITOR) 40 MG tablet, TAKE 1 TABLET DAILY, Disp: 90 tablet, Rfl: 1    brimonidine 0.2% (ALPHAGAN) 0.2 % Drop, Place 1 drop into the left eye 2 (two) times a day., Disp: , Rfl:     dapagliflozin propanediol (FARXIGA) 10 mg tablet, TAKE 1 TABLET ONCE DAILY, Disp: 90 tablet, Rfl: 3    ezetimibe (ZETIA) 10 mg tablet, TAKE 1 TABLET DAILY, Disp: 90 tablet, Rfl: 1    fluticasone propionate (FLONASE) 50 mcg/actuation nasal spray, USE 2 SPRAYS IN EACH       NOSTRIL ONCE DAILY, Disp: 48 g, Rfl: 1    metoprolol  succinate (TOPROL-XL) 25 MG 24 hr tablet, Take 1 tablet (25 mg total) by mouth once daily., Disp: 90 tablet, Rfl: 1    pantoprazole (PROTONIX) 40 MG tablet, Take 40 mg by mouth., Disp: , Rfl:     tadalafiL (CIALIS) 20 MG Tab, TAKE 1 TABLET DAILY AS     NEEDED (INTERCOURSE)., Disp: 18 tablet, Rfl: 3    tamsulosin (FLOMAX) 0.4 mg Cap, TAKE 1 CAPSULE BY MOUTH EVERY DAY, Disp: 90 capsule, Rfl: 1    theophylline (THEODUR) 300 MG 12 hr tablet, Take 1 tablet (300 mg total) by mouth 2 (two) times daily., Disp: 60 tablet, Rfl: 11    TRELEGY ELLIPTA 100-62.5-25 mcg DsDv, USE 1 INHALATION ORALLY    DAILY., Disp: 60 each, Rfl: 1    valsartan-hydrochlorothiazide (DIOVAN-HCT) 160-12.5 mg per tablet, TAKE 1 TABLET ONCE DAILY, Disp: 90 tablet, Rfl: 1    vibegron 75 mg Tab, Take one tablet daily, Disp: 90 tablet, Rfl: 3  [2]   Outpatient Encounter Medications as of 7/30/2025   Medication Sig Dispense Refill    albuterol-budesonide (AIRSUPRA) 90-80 mcg/actuation USE 2 INHALATIONS ORALLY   EVERY 6 HOURS AS NEEDED FORSHORTNESS OF BREATH. 10.7 g 5    atorvastatin (LIPITOR) 40 MG tablet TAKE 1 TABLET DAILY 90 tablet 1    brimonidine 0.2% (ALPHAGAN) 0.2 % Drop Place 1 drop into the left eye 2 (two) times a day.      dapagliflozin propanediol (FARXIGA) 10 mg tablet TAKE 1 TABLET ONCE DAILY 90 tablet 3    ezetimibe (ZETIA) 10 mg tablet TAKE 1 TABLET DAILY 90 tablet 1    fluticasone propionate (FLONASE) 50 mcg/actuation nasal spray USE 2 SPRAYS IN EACH       NOSTRIL ONCE DAILY 48 g 1    metoprolol succinate (TOPROL-XL) 25 MG 24 hr tablet Take 1 tablet (25 mg total) by mouth once daily. 90 tablet 1    pantoprazole (PROTONIX) 40 MG tablet Take 40 mg by mouth.      tadalafiL (CIALIS) 20 MG Tab TAKE 1 TABLET DAILY AS     NEEDED (INTERCOURSE). 18 tablet 3    tamsulosin (FLOMAX) 0.4 mg Cap TAKE 1 CAPSULE BY MOUTH EVERY DAY 90 capsule 1    theophylline (THEODUR) 300 MG 12 hr tablet Take 1 tablet (300 mg total) by mouth 2 (two) times daily. 60  tablet 11    TRELEGY ELLIPTA 100-62.5-25 mcg DsDv USE 1 INHALATION ORALLY    DAILY. 60 each 1    valsartan-hydrochlorothiazide (DIOVAN-HCT) 160-12.5 mg per tablet TAKE 1 TABLET ONCE DAILY 90 tablet 1    vibegron 75 mg Tab Take one tablet daily 90 tablet 3    [DISCONTINUED] TRELEGY ELLIPTA 100-62.5-25 mcg DsDv USE 1 INHALATION ORALLY    DAILY. 60 each 1     No facility-administered encounter medications on file as of 7/30/2025.

## 2025-07-31 ENCOUNTER — OFFICE VISIT (OUTPATIENT)
Dept: OTOLARYNGOLOGY | Facility: CLINIC | Age: 70
End: 2025-07-31
Payer: COMMERCIAL

## 2025-07-31 ENCOUNTER — CLINICAL SUPPORT (OUTPATIENT)
Dept: AUDIOLOGY | Facility: CLINIC | Age: 70
End: 2025-07-31
Payer: COMMERCIAL

## 2025-07-31 VITALS — BODY MASS INDEX: 32.48 KG/M2 | HEIGHT: 71 IN | WEIGHT: 232 LBS

## 2025-07-31 DIAGNOSIS — H91.91 HEARING LOSS OF RIGHT EAR, UNSPECIFIED HEARING LOSS TYPE: Primary | ICD-10-CM

## 2025-07-31 DIAGNOSIS — H90.3 SENSORINEURAL HEARING LOSS (SNHL) OF BOTH EARS: Primary | ICD-10-CM

## 2025-07-31 PROCEDURE — 99212 OFFICE O/P EST SF 10 MIN: CPT | Mod: PBBFAC | Performed by: AUDIOLOGIST

## 2025-07-31 PROCEDURE — 99999 PR PBB SHADOW E&M-EST. PATIENT-LVL II: CPT | Mod: PBBFAC,,, | Performed by: AUDIOLOGIST

## 2025-07-31 PROCEDURE — 99999 PR PBB SHADOW E&M-EST. PATIENT-LVL III: CPT | Mod: PBBFAC,,, | Performed by: OTOLARYNGOLOGY

## 2025-07-31 PROCEDURE — 99213 OFFICE O/P EST LOW 20 MIN: CPT | Mod: PBBFAC,25 | Performed by: OTOLARYNGOLOGY

## 2025-07-31 PROCEDURE — 99214 OFFICE O/P EST MOD 30 MIN: CPT | Mod: S$PBB,,, | Performed by: OTOLARYNGOLOGY

## 2025-07-31 PROCEDURE — 99999PBSHW PR PBB SHADOW TECHNICAL ONLY FILED TO HB: Mod: PBBFAC,,,

## 2025-07-31 PROCEDURE — 92552 PURE TONE AUDIOMETRY AIR: CPT | Mod: PBBFAC | Performed by: AUDIOLOGIST

## 2025-07-31 NOTE — PROGRESS NOTES
Subjective:       Patient ID: Mikhail Stanford is a 70 y.o. male.    Chief Complaint: Hearing Loss (Right ear. Hearing test done. )    HPI  Review of Systems   HENT:  Positive for hearing loss.    All other systems reviewed and are negative.      Objective:      Physical Exam  General: NAD  Head: Normocephalic, atraumatic, no facial asymmetry/normal strength,  Ears: Both auricules normal in appearance, w/o deformities tympanic membranes normal external auditory canals normal  Nose: External nose w/o deformities normal turbinates no drainage or inflammation  Oral Cavity: Lips, gums, floor of mouth, tongue hard palate, and buccal mucosa without mass/lesion  Oropharynx: Mucosa pink and moist, soft palate, posterior pharynx and oropharyngeal wall without mass/lesion  Neck: Supple, symmetric, trachea midline, no palpable mass/lesion, no palpable cervical lymphadenopathy  Skin: Warm and dry, no concerning lesions  Respiratory: Respirations even, unlabored    Assessment:       1. Sensorineural hearing loss (SNHL) of both ears        Plan:       Audio reviewed and interpreted   Mild SNHL

## 2025-08-08 ENCOUNTER — TELEPHONE (OUTPATIENT)
Dept: CARDIOLOGY | Facility: CLINIC | Age: 70
End: 2025-08-08
Payer: COMMERCIAL

## 2025-08-21 RX ORDER — METOPROLOL SUCCINATE 25 MG/1
25 TABLET, EXTENDED RELEASE ORAL
Qty: 90 TABLET | Refills: 1 | Status: SHIPPED | OUTPATIENT
Start: 2025-08-21

## 2025-08-22 DIAGNOSIS — I42.0 CONGESTIVE CARDIOMYOPATHY: Primary | ICD-10-CM

## 2025-08-25 ENCOUNTER — TELEPHONE (OUTPATIENT)
Dept: CARDIOLOGY | Facility: CLINIC | Age: 70
End: 2025-08-25
Payer: MEDICARE

## (undated) DEVICE — STOCKINETTE IMPERVIOUS LG 9X48

## (undated) DEVICE — NDL HYPODERMIC SAFETY 25G 1IN

## (undated) DEVICE — ETCO2 NC MICROSTR FEM ST ADLT

## (undated) DEVICE — DECANTER FLUID TRNSF WHITE 9IN

## (undated) DEVICE — GLOVE SENSICARE PI GRN 7

## (undated) DEVICE — PAD CURAD NONADH 3X4IN

## (undated) DEVICE — SET IV PRIMARY

## (undated) DEVICE — SLING ARM STP PAD BLUE XL 9X22

## (undated) DEVICE — GLOVE SENSICARE PI SURG 6.5

## (undated) DEVICE — CHLORAPREP 10.5 ML APPLICATOR

## (undated) DEVICE — DRAPE U SPLIT SHEET 54X76IN

## (undated) DEVICE — GLOVE PROTEXIS PI CRM 5.5

## (undated) DEVICE — SOL NACL IRR 1000ML BTL

## (undated) DEVICE — SUT 2-0 VICRYL / CT-1

## (undated) DEVICE — DRESSING TRANS 4X4 TEGADERM

## (undated) DEVICE — DRESSING XEROFORM NONADH 1X8IN

## (undated) DEVICE — PENCIL ELECTROSURG HOLST W/BLD

## (undated) DEVICE — SPONGE LAP XRAY 5/PK 12X12IN

## (undated) DEVICE — APPLICATOR CHLORAPREP ORN 26ML

## (undated) DEVICE — GOWN IMPERVIOUS BLUE XXL

## (undated) DEVICE — BLADE SURG #15 CARBON STEEL

## (undated) DEVICE — TAPE SUTU FIBERLNK WH/BL 0.9MM

## (undated) DEVICE — STRIP MEDI WND CLSR 1/4X4IN

## (undated) DEVICE — KIT SUTURETAK PERC INSRT 3MM

## (undated) DEVICE — PAD CAST SPECIALIST STRL 3

## (undated) DEVICE — SET EXTENSION CLEARLINK 2INJ

## (undated) DEVICE — CATH DIAG 6F FL3.5 100CM

## (undated) DEVICE — Device

## (undated) DEVICE — KIT GLIDESHEATH SLEND 6FR 10CM

## (undated) DEVICE — OXISENSOR ADULT DIGIT N/S

## (undated) DEVICE — BLADE SURG SAFELOCK #15 ST CAR

## (undated) DEVICE — INTRODUCER KIT MICRO 4FR

## (undated) DEVICE — PROTECTOR ULNAR NERVE FOAM

## (undated) DEVICE — BANDAGE COHES LTX TAN 4INX5YD

## (undated) DEVICE — GLOVE SURG BIOGEL LATEX SZ 7.5

## (undated) DEVICE — BAND TR COMP DEVICE REG 24CM

## (undated) DEVICE — BAG RECTANGLE RBBRBND 30X36IN

## (undated) DEVICE — TOURNIQUET SB QC DP 24X4IN

## (undated) DEVICE — COVER PROBE US GEL BAND

## (undated) DEVICE — DRAPE INVISISHIELD U 48X52IN

## (undated) DEVICE — SPONGE COTTON TRAY 4X4IN

## (undated) DEVICE — CUFF FLEXIPORT BP LONG ADULT

## (undated) DEVICE — GLOVE PROTEXIS PI SYN SURG 6.5

## (undated) DEVICE — KIT IV START WITH PREVANTICS

## (undated) DEVICE — GOWN POLY REINF BRTH SLV XL

## (undated) DEVICE — CAST SMALL OR FROM CAST CART

## (undated) DEVICE — SUT MONOCRYL 3-0 PS-2 UND

## (undated) DEVICE — SUT 2-0 12-18IN SILK

## (undated) DEVICE — GLOVE SENSICARE PI SURG 7

## (undated) DEVICE — KIT HEART ANGIO MFLD LEFT RUSH

## (undated) DEVICE — CONTRAST ISOVUE 370 100ML

## (undated) DEVICE — SUT 4-0 CHROMIC GUT FS-2

## (undated) DEVICE — GLOVE SENSICARE PI GRN 6.5

## (undated) DEVICE — TOWEL OR DISP STRL BLUE 4/PK

## (undated) DEVICE — ADHESIVE MASTISOL VIAL 48/BX

## (undated) DEVICE — CATH DIAG JACKY 3.5 6FRX110CM

## (undated) DEVICE — GLOVE PROTEXIS PI SYN SURG 7.5

## (undated) DEVICE — SYR 10CC LUER LOCK

## (undated) DEVICE — GUIDEWIRE INQWIRE SE 3MM JTIP

## (undated) DEVICE — GLOVE SENSICARE PI GRN 8